# Patient Record
Sex: FEMALE | Race: WHITE | Employment: OTHER | RURAL
[De-identification: names, ages, dates, MRNs, and addresses within clinical notes are randomized per-mention and may not be internally consistent; named-entity substitution may affect disease eponyms.]

---

## 2017-01-11 ENCOUNTER — TELEPHONE (OUTPATIENT)
Dept: FAMILY MEDICINE CLINIC | Age: 82
End: 2017-01-11

## 2017-01-11 RX ORDER — METOPROLOL TARTRATE 25 MG/1
TABLET, FILM COATED ORAL
Qty: 14 TAB | Refills: 0 | Status: SHIPPED | OUTPATIENT
Start: 2017-01-11 | End: 2017-06-05 | Stop reason: SDUPTHER

## 2017-01-11 RX ORDER — METOPROLOL TARTRATE 25 MG/1
TABLET, FILM COATED ORAL
Qty: 90 TAB | Refills: 1 | Status: SHIPPED | OUTPATIENT
Start: 2017-01-11 | End: 2017-01-11 | Stop reason: SDUPTHER

## 2017-01-11 NOTE — TELEPHONE ENCOUNTER
Pt's daughter called stated her mother has no metoprolol left. She needs a 14 days supply sent to [facility] and 90 day supply sent to mail order.

## 2017-01-17 ENCOUNTER — OFFICE VISIT (OUTPATIENT)
Dept: FAMILY MEDICINE CLINIC | Age: 82
End: 2017-01-17

## 2017-01-17 VITALS
HEART RATE: 45 BPM | SYSTOLIC BLOOD PRESSURE: 136 MMHG | HEIGHT: 67 IN | DIASTOLIC BLOOD PRESSURE: 44 MMHG | TEMPERATURE: 95.3 F | BODY MASS INDEX: 19.59 KG/M2 | WEIGHT: 124.8 LBS

## 2017-01-17 DIAGNOSIS — K30 CHRONIC UPSET STOMACH: Primary | ICD-10-CM

## 2017-01-17 NOTE — MR AVS SNAPSHOT
Visit Information Date & Time Provider Department Dept. Phone Encounter #  
 1/17/2017  2:00 PM Davion Sotelo NP [address] [phone] [id number] Upcoming Health Maintenance Date Due DTaP/Tdap/Td series (1 - Tdap) 12/19/1953 ZOSTER VACCINE AGE 60> 12/19/1992 GLAUCOMA SCREENING Q2Y 12/19/1997 OSTEOPOROSIS SCREENING (DEXA) 12/19/1997 MEDICARE YEARLY EXAM 3/10/2017 Pneumococcal 65+ Low/Medium Risk (2 of 2 - PPSV23) 11/8/2017 Allergies as of 1/17/2017  Review Complete On: 1/17/2017 By: Kaylene Riley LPN No Known Allergies Current Immunizations  Reviewed on 11/8/2016 Name Date Influenza High Dose Vaccine PF 10/14/2016 Influenza Vaccine Sharonda Bloodgood) 11/3/2015 Pneumococcal Conjugate (PCV-13) 11/8/2016 Not reviewed this visit You Were Diagnosed With   
  
 Codes Comments Chronic upset stomach    -  Primary ICD-10-CM: K31.9, R10.13 ICD-9-CM: 536.8 Vitals BP Pulse Temp Height(growth percentile) Weight(growth percentile) BMI  
 136/44 (BP 1 Location: Right arm, BP Patient Position: Sitting) (!) 45 95.3 °F (35.2 °C) (Oral) 5' 7\" (1.702 m) 124 lb 12.8 oz (56.6 kg) 19.55 kg/m2 OB Status Smoking Status Hysterectomy Former Smoker Vitals History BMI and BSA Data Body Mass Index Body Surface Area  
 19.55 kg/m 2 1.64 m 2 Preferred Pharmacy Pharmacy Name Phone [facility] [address] [place] [address] [address] Your Updated Medication List  
  
   
This list is accurate as of: 1/17/17  2:48 PM.  Always use your most recent med list.  
  
  
  
  
 cholecalciferol (VITAMIN D3) 5,000 unit Tab tablet Commonly known as:  VITAMIN D3 Take  by mouth daily. donepezil 5 mg tablet Commonly known as:  ARICEPT Take 1 Tab by mouth nightly. fluticasone 50 mcg/actuation nasal spray Commonly known as:  Felix Blizzard 2 Sprays by Both Nostrils route daily. metoprolol tartrate 25 mg tablet Commonly known as:  LOPRESSOR Take 1/2 tab  2x day  
  
 omeprazole 40 mg capsule Commonly known as:  PRILOSEC Take 1 capsule by mouth daily. sertraline 50 mg tablet Commonly known as:  ZOLOFT One po qd  
  
 valsartan 160 mg tablet Commonly known as:  DIOVAN  
1/2 tab BID We Performed the Following H PYLORI AB, IGG, QT [id number] CPT(R)] To-Do List   
 01/17/2017 Microbiology:  CULTURE, STOOL Introducing [facility]! [facility] introduces [employer] patient portal. Now you can access parts of your medical record, email your doctor's office, and request medication refills online. 1. In your internet browser, go to [email or web address] 2. Click on the First Time User? Click Here link in the Sign In box. You will see the New Member Sign Up page. 3. Enter your [employer] Access Code exactly as it appears below. You will not need to use this code after youve completed the sign-up process. If you do not sign up before the expiration date, you must request a new code. · [employer] Access Code: [email or web address] Expires: 4/17/2017  2:43 PM 
 
4. Enter the last four digits of your Social Security Number (xxxx) and Date of Birth (mm/dd/yyyy) as indicated and click Submit. You will be taken to the next sign-up page. 5. Create a Ink[employer] System[employer] ID. This will be your [employer] login ID and cannot be changed, so think of one that is secure and easy to remember. 6. Create a [employer] password. You can change your password at any time. 7. Enter your Password Reset Question and Answer. This can be used at a later time if you forget your password. 8. Enter your e-mail address. You will receive e-mail notification when new information is available in [address] 9. Click Sign Up. You can now view and download portions of your medical record. 10. Click the Download Summary menu link to download a portable copy of your medical information. If you have questions, please visit the Frequently Asked Questions section of the [employer] website. Remember, [employer] is NOT to be used for urgent needs. For medical emergencies, dial 911. Now available from your iPhone and Android! Please provide this summary of care documentation to your next provider. Your primary care clinician is listed as Rj Barreto. If you have any questions after today's visit, please call [phone].

## 2017-01-17 NOTE — PROGRESS NOTES
Subjective:     Dirk Amaral is a 80 y.o. female who presents today with the following:  Chief Complaint   Patient presents with    Vomiting    Fatigue    Decreased Appetite   Dirk Amaral presents for stomach concerns. Decrease appetite with vomiting some mornings. Ate some food left out in a can back in November when stomach issues started. Vomiting not witnessed by care takers. No chest pain, no angina no shortness of breath. No orthopnea or PND. No dependent edema. No abdominal pain, change in bowel habits, no blood in stool or black stools. No urinary frequency, urgency, dysuria. No change in voiding pattern or stream, no significant nocturia. No problems with medications and is compliant. Discrepancy with medications . Antihypertensive medication not filled for a few days. List provided to  to take home to share with patient's daughter. Having some increased memory issues per patient and her partner previous diagnosis of MCI. CT scan done in 2015       Problem list reviewed as part of this encounter. ROS:  Gen: denies fever, chills, fatigue, weight loss, weight gain  HEENT:denies blurry vision, nasal congestion, sore throat  Resp: denies dypsnea, cough, wheezing  CV: denies chest pain radiating to the jaws or arms, palpitations, lower extremity edema  Abd: denies nausea, vomiting, diarrhea, constipation  Neuro: denies numbness/tingling  Endo: denies polyuria, polydipsia, heat/cold intolerance  Heme: no lymphadenopathy    No Known Allergies      Current Outpatient Prescriptions:     metoprolol tartrate (LOPRESSOR) 25 mg tablet, Take 1/2 tab  2x day, Disp: 14 Tab, Rfl: 0    donepezil (ARICEPT) 5 mg tablet, Take 1 Tab by mouth nightly.  (Patient taking differently: Take 10 mg by mouth nightly.), Disp: 90 Tab, Rfl: 0    sertraline (ZOLOFT) 50 mg tablet, One po qd, Disp: 90 Tab, Rfl: 1    valsartan (DIOVAN) 160 mg tablet, 1/2 tab BID, Disp: 90 Tab, Rfl: 3    fluticasone (FLONASE) 50 mcg/actuation nasal spray, 2 Sprays by Both Nostrils route daily. , Disp: 3 Bottle, Rfl: 3    cholecalciferol, VITAMIN D3, (VITAMIN D3) 5,000 unit tab tablet, Take  by mouth daily. , Disp: , Rfl:     omeprazole (PRILOSEC) 40 mg capsule, Take 1 capsule by mouth daily. , Disp: 90 capsule, Rfl: 3    Past Medical History   Diagnosis Date    COPD [facility]chronic obstructive pulmonary disease) ([facility] [address]     HTN (hypertension)     MCI (mild cognitive impairment)        Past Surgical History   Procedure Laterality Date    Hx colonoscopy  6/14     has had several    Hx aleyda and bso         History   Smoking Status    Former Smoker    Packs/day: 1.00    Years: 33.00    Types: Cigarettes    Start date: 1/1/1952   Jimmie Cords Quit date: 1/1/1985   Smokeless Tobacco    Never Used     Comment: \"Quit many years ago\"       Social History     Social History    Marital status:      Spouse name: N/A    Number of children: N/A    Years of education: N/A     Social History Main Topics    Smoking status: Former Smoker     Packs/day: 1.00     Years: 33.00     Types: Cigarettes     Start date: 1/1/1952     Quit date: 1/1/1985    Smokeless tobacco: Never Used      Comment: \"Quit many years ago\"    Alcohol use No    Drug use: No    Sexual activity: Not Asked     Other Topics Concern    None     Social History Narrative       Family History   Problem Relation Age of Onset    Cancer Mother     Cancer Father     Stroke Sister     Cancer Sister          Objective:     Visit Vitals    /44 (BP 1 Location: Right arm, BP Patient Position: Sitting)  Comment: Manual    Pulse (!) 45    Temp 95.3 °F (35.2 °C) (Oral)    Ht 5' 7\" (1.702 m)    Wt 124 lb 12.8 oz (56.6 kg)    BMI 19.55 kg/m2     Body mass index is 19.55 kg/(m^2). Gen: alert, oriented, no acute distress, down 3 lbs since last visit.   Head: normocephalic, atraumatic  Eyes:sclera clear, conjunctiva clear  Oral: moist mucus membranes, no oral lesions, no pharyngeal exudate, no pharyngeal erythema  Neck: symmetric normal sized thyroid, no carotid bruit  Resp: Normal work of breathing, lungs CTAB, no w/r/r  CV: S1, S2 normal.  No murmurs, rubs, or gallops. Abd:  Normal bowel sounds. Soft, not tender, not distended. Skin: no rash             Extremities: no edema    Results for orders placed or performed in visit on 11/08/16   CBC WITH AUTOMATED DIFF   Result Value Ref Range    WBC CANCELED x10E3/uL    RBC CANCELED     HGB CANCELED     HCT CANCELED     PLATELET CANCELED     NEUTROPHILS CANCELED     Lymphocytes CANCELED     MONOCYTES CANCELED     EOSINOPHILS CANCELED     Abs Lymphocytes CANCELED     ABS. EOSINOPHILS CANCELED     ABS. BASOPHILS CANCELED    METABOLIC PANEL, COMPREHENSIVE   Result Value Ref Range    Glucose 88 65 - 99 mg/dL    BUN 13 8 - 27 mg/dL    Creatinine 0.99 0.57 - 1.00 mg/dL    GFR est non-AA 53 (L) >59 mL/min/1.73    GFR est AA 61 >59 mL/min/1.73    BUN/Creatinine ratio 13 11 - 26    Sodium 143 136 - 144 mmol/L    Potassium 5.2 3.5 - 5.2 mmol/L    Chloride 106 97 - 106 mmol/L    CO2 24 18 - 29 mmol/L    Calcium 9.2 8.7 - 10.3 mg/dL    Protein, total 6.5 6.0 - 8.5 g/dL    Albumin 4.2 3.5 - 4.7 g/dL    GLOBULIN, TOTAL 2.3 1.5 - 4.5 g/dL    A-G Ratio 1.8 1.1 - 2.5    Bilirubin, total 0.4 0.0 - 1.2 mg/dL    Alk. phosphatase 53 39 - 117 IU/L    AST 25 0 - 40 IU/L    ALT 17 0 - 32 IU/L   TSH 3RD GENERATION   Result Value Ref Range    TSH 1.730 0.450 - 4.500 uIU/mL   VITAMIN B12   Result Value Ref Range    Vitamin B12 222 211 - 946 pg/mL   CKD REPORT   Result Value Ref Range    Interpretation Note        No results found for this visit on 01/17/17. Assessment/ Plan:     Heena Ramachandran was seen today for vomiting, fatigue and decreased appetite. Diagnoses and all orders for this visit:    Chronic upset stomach  -     H PYLORI AB, IGG, QT  -     CULTURE, STOOL; Future         1.  Chronic upset stomach        Orders Placed This Encounter    CULTURE, STOOL     Standing Status:   Future     Standing Expiration Date:   7/17/2017    H PYLORI AB, IGG, QT     Plan to discuss care with daughter. Addressed 6 small meals per day. Nutrition,  Monitor for dehydration and malnutrition     Verbal and written instructions (see AVS) provided.  Patient expresses understanding of diagnosis and treatment plan.     Renea Dominguez, DENNISP-C

## 2017-01-19 LAB — H PYLORI IGG SER IA-ACNC: 0.9 U/ML (ref 0–0.8)

## 2017-01-20 ENCOUNTER — TELEPHONE (OUTPATIENT)
Dept: FAMILY MEDICINE CLINIC | Age: 82
End: 2017-01-20

## 2017-01-20 RX ORDER — CLARITHROMYCIN 500 MG/1
500 TABLET, FILM COATED ORAL 2 TIMES DAILY
Qty: 14 TAB | Refills: 0 | Status: SHIPPED | OUTPATIENT
Start: 2017-01-20 | End: 2017-01-27

## 2017-01-20 RX ORDER — METRONIDAZOLE 250 MG/1
250 TABLET ORAL 4 TIMES DAILY
Qty: 40 TAB | Refills: 0 | Status: SHIPPED | OUTPATIENT
Start: 2017-01-20 | End: 2017-01-30

## 2017-01-20 NOTE — PROGRESS NOTES
Positive for H pylori  Continue prilosec   Clarithromycin 500 mg Q 12 hours x 7 days  Flagyl 250 mg q 6 hours x 10 days    Would like patient to consider  Namenda 10mg bid for memory loss .   Aricept big pill     Would consider consult with speech therapist to evaluate if decreased appetite , vomiting persist

## 2017-01-23 ENCOUNTER — TELEPHONE (OUTPATIENT)
Dept: FAMILY MEDICINE CLINIC | Age: 82
End: 2017-01-23

## 2017-01-23 NOTE — TELEPHONE ENCOUNTER
Spoke with partner Jaswinder Conner. Patient has had nausea,vomitimg and diarrhea since she was last seen. Denies any pain,fever,chills or problems voiding. Was able to eat waffles this am and took antibiotcs but vomited shortly after that. Please advise.

## 2017-01-23 NOTE — TELEPHONE ENCOUNTER
Also advised Mr Oliver Anjali per Elisa Chen stop Flagyl and only take antibiotic 2 times a day. Advised him to take her ER if she is not any better.

## 2017-01-27 ENCOUNTER — OFFICE VISIT (OUTPATIENT)
Dept: SURGERY | Age: 82
End: 2017-01-27

## 2017-01-27 VITALS
WEIGHT: 127.8 LBS | BODY MASS INDEX: 20.06 KG/M2 | DIASTOLIC BLOOD PRESSURE: 47 MMHG | HEART RATE: 52 BPM | SYSTOLIC BLOOD PRESSURE: 138 MMHG | HEIGHT: 67 IN

## 2017-01-27 DIAGNOSIS — R19.7 VOMITING AND DIARRHEA: Primary | ICD-10-CM

## 2017-01-27 DIAGNOSIS — R11.10 VOMITING AND DIARRHEA: Primary | ICD-10-CM

## 2017-01-27 NOTE — PROGRESS NOTES
Beti Cali old white female who has memory issues and is unable to give a complete history but most is gotten from the records and her friend. She is oriented and able to answer questions appropriately. It appear she ate some bad soup and developed some vomiting and abdominal pain. Along the way she was found to have H.pylori by some test.  She was placed on Azithromycin and Flagyl and the Flagyl upset her stomach and gave her diarrhea. She stopped the Flagyl and she felt better. At this point in time she feels \"good\" and has no c/o. She ate a breakfast of cereal and had a normal BM today. She has had no previous episodes like this. She feels back to normal now. EXAM:  Visit Vitals    /47 (BP 1 Location: Left arm, BP Patient Position: Sitting)    Pulse (!) 52    Ht 5' 7\" (1.702 m)    Wt 127 lb 12.8 oz (58 kg)    BMI 20.02 kg/m2       Thin white female in no acute distress  Abdomen benign    IMP:  Most likely a mild food poisoning that was exacerbated by her antibiotic therapy. Resolved now. PLAN:  Probiotic daily for one month.   See PMD if problems

## 2017-04-26 RX ORDER — DONEPEZIL HYDROCHLORIDE 5 MG/1
5 TABLET, FILM COATED ORAL
Qty: 90 TAB | Refills: 0 | Status: SHIPPED | OUTPATIENT
Start: 2017-04-26 | End: 2017-09-06 | Stop reason: SDUPTHER

## 2017-06-05 RX ORDER — METOPROLOL TARTRATE 25 MG/1
TABLET, FILM COATED ORAL
Qty: 45 TAB | Refills: 3 | Status: SHIPPED | OUTPATIENT
Start: 2017-06-05 | End: 2017-12-12 | Stop reason: ALTCHOICE

## 2017-09-07 RX ORDER — SERTRALINE HYDROCHLORIDE 50 MG/1
TABLET, FILM COATED ORAL
Qty: 90 TAB | Refills: 1 | Status: SHIPPED | OUTPATIENT
Start: 2017-09-07 | End: 2018-01-29 | Stop reason: SDUPTHER

## 2017-09-07 RX ORDER — DONEPEZIL HYDROCHLORIDE 5 MG/1
TABLET, FILM COATED ORAL
Qty: 90 TAB | Refills: 0 | Status: SHIPPED | OUTPATIENT
Start: 2017-09-07 | End: 2017-12-12 | Stop reason: SDUPTHER

## 2017-09-13 ENCOUNTER — TELEPHONE (OUTPATIENT)
Dept: FAMILY MEDICINE CLINIC | Age: 82
End: 2017-09-13

## 2017-09-13 NOTE — TELEPHONE ENCOUNTER
Spoke with daughter Darin Higgins who states she is a RN,expressed concerns about patient not feeling generally well,denies fever,pain but reports having 2 fall yesterday ,more lethargic,denies any injury . Advised ER evaluation but daughter did not agree and states her mother would not want to go. Hbas an appointment with DR Dolly Pandya 10-  for a checkup,but instructed to call in AM for a same day appointment for a sooner evaluation.

## 2017-10-10 ENCOUNTER — OFFICE VISIT (OUTPATIENT)
Dept: FAMILY MEDICINE CLINIC | Age: 82
End: 2017-10-10

## 2017-10-10 VITALS
HEIGHT: 67 IN | TEMPERATURE: 97.8 F | OXYGEN SATURATION: 98 % | HEART RATE: 55 BPM | BODY MASS INDEX: 19.15 KG/M2 | DIASTOLIC BLOOD PRESSURE: 63 MMHG | WEIGHT: 122 LBS | SYSTOLIC BLOOD PRESSURE: 137 MMHG | RESPIRATION RATE: 16 BRPM

## 2017-10-10 DIAGNOSIS — Z00.00 MEDICARE ANNUAL WELLNESS VISIT, SUBSEQUENT: Primary | ICD-10-CM

## 2017-10-10 DIAGNOSIS — I10 ESSENTIAL HYPERTENSION: ICD-10-CM

## 2017-10-10 DIAGNOSIS — Z71.89 ADVANCE DIRECTIVE DISCUSSED WITH PATIENT: ICD-10-CM

## 2017-10-10 DIAGNOSIS — Z13.31 SCREENING FOR DEPRESSION: ICD-10-CM

## 2017-10-10 DIAGNOSIS — Z13.39 SCREENING FOR ALCOHOLISM: ICD-10-CM

## 2017-10-10 DIAGNOSIS — J44.9 CHRONIC OBSTRUCTIVE PULMONARY DISEASE, UNSPECIFIED COPD TYPE (HCC): ICD-10-CM

## 2017-10-10 RX ORDER — LORATADINE 10 MG/1
10 TABLET ORAL
COMMUNITY
End: 2018-10-25 | Stop reason: SDUPTHER

## 2017-10-10 NOTE — PROGRESS NOTES
David Santiago is a 80 y.o. female and presents for annual Medicare Wellness Visit. Hypertension. Blood pressures have been stable. Management at last visit included continuing current regimen . Current regimen: angiotensin II receptor antagonist and beta-blocker. Symptoms include tiredness/fatigue. Patient denies chest pain, palpitations, peripheral edema. Lab review:   Lab Results   Component Value Date/Time    Sodium 143 11/08/2016 08:45 AM    Potassium 5.2 11/08/2016 08:45 AM    Chloride 106 11/08/2016 08:45 AM    CO2 24 11/08/2016 08:45 AM    Glucose 88 11/08/2016 08:45 AM    BUN 13 11/08/2016 08:45 AM    Creatinine 0.99 11/08/2016 08:45 AM    BUN/Creatinine ratio 13 11/08/2016 08:45 AM    GFR est AA 61 11/08/2016 08:45 AM    GFR est non-AA 53 11/08/2016 08:45 AM    Calcium 9.2 11/08/2016 08:45 AM       PMH, SH, Medications/Allergies: reviewed, on chart.     ROS:  Constitutional: No fever, chills or weight loss  Respiratory: No cough, SOB   CV: No chest pain or Palpitations    Visit Vitals    /63 (BP 1 Location: Left arm, BP Patient Position: Sitting)    Pulse (!) 55    Temp 97.8 °F (36.6 °C) (Temporal)    Resp 16    Ht 5' 7\" (1.702 m)    Wt 122 lb (55.3 kg)    SpO2 98%    BMI 19.11 kg/m2     Wt Readings from Last 3 Encounters:   10/10/17 122 lb (55.3 kg)   01/27/17 127 lb 12.8 oz (58 kg)   01/17/17 124 lb 12.8 oz (56.6 kg)     Physical Examination: General appearance - alert, well appearing, and in no distress  Mental status - alert, oriented to person, place, and time  Eyes - pupils equal and reactive, extraocular eye movements intact  ENT - bilateral external ears and nose normal. Normal lips  Neck - supple, no significant adenopathy, no thyromegaly or mass  Lymphatics - no palpable lymphadenopathy, no hepatosplenomegaly  Chest - clear to auscultation, no wheezes, rales or rhonchi, symmetric air entry  Heart - normal rate, regular rhythm, normal S1, S2, no murmurs, rubs, clicks or gallops  Extremities - peripheral pulses normal, no pedal edema, no clubbing or cyanosis    A/P:  HTN  With some bradycardia, seems to be symptomatic, given recent problems climbing the stairs. Try change to norvasc 2.5mg qd, may also make COPD a little less bothersome. Sent to [facility].    _____________________________________________________________________________________________________________________________________________________________________    Problem List: Reviewed with patient and discussed risk factors.     Patient Active Problem List   Diagnosis Code    MCI (mild cognitive impairment) G31.84    White matter disease R90.82    B12 deficiency E53.8    Hypothyroid E03.9    Screen for colon cancer Z12.11    COPD (chronic obstructive pulmonary disease) ([facility] [address] J44.9    HTN (hypertension) I10       Current medical providers:  Patient Care Team:  Stephen Carey NP as PCP - General (Nurse Practitioner)    PSH: Reviewed with patient  Past Surgical History:   Procedure Laterality Date    HX COLONOSCOPY  6/14    has had several    HX MARLEN AND BSO          SH: Reviewed with patient  Social History   Substance Use Topics    Smoking status: Former Smoker     Packs/day: 1.00     Years: 33.00     Types: Cigarettes     Start date: 1/1/1952     Quit date: 1/1/1985    Smokeless tobacco: Never Used      Comment: \"Quit many years ago\"    Alcohol use No       FH: Reviewed with patient  Family History   Problem Relation Age of Onset    Cancer Mother     Cancer Father     Stroke Sister     Cancer Sister        Medications/Allergies: Reviewed with patient  Current Outpatient Prescriptions on File Prior to Visit   Medication Sig Dispense Refill    donepezil (ARICEPT) 5 mg tablet TAKE 1 TABLET  NIGHTLY 90 Tab 0    sertraline (ZOLOFT) 50 mg tablet One po qd 90 Tab 1    metoprolol tartrate (LOPRESSOR) 25 mg tablet Take 1/2 tab  2x day 45 Tab 3    valsartan (DIOVAN) 160 mg tablet 1/2 tab BID 90 Tab 3    fluticasone (FLONASE) 50 mcg/actuation nasal spray 2 Sprays by Both Nostrils route daily. 3 Bottle 3    cholecalciferol, VITAMIN D3, (VITAMIN D3) 5,000 unit tab tablet Take  by mouth daily.  omeprazole (PRILOSEC) 40 mg capsule Take 1 capsule by mouth daily. 90 capsule 3     No current facility-administered medications on file prior to visit. Allergies   Allergen Reactions    Pcn [Penicillins] Shortness of Breath       Objective:  Visit Vitals    Ht 5' 7\" (1.702 m)    Wt 122 lb (55.3 kg)    BMI 19.11 kg/m2    Body mass index is 19.11 kg/(m^2). Assessment of cognitive impairment: Alert and oriented x 3    Depression Screen:   PHQ over the last two weeks 10/10/2017   Little interest or pleasure in doing things Not at all   Feeling down, depressed or hopeless Nearly every day   Total Score PHQ 2 3   Trouble falling or staying asleep, or sleeping too much Not at all   Feeling tired or having little energy Nearly every day   Poor appetite or overeating Several days   Feeling bad about yourself - or that you are a failure or have let yourself or your family down Not at all   Trouble concentrating on things such as school, work, reading or watching TV Nearly every day   Moving or speaking so slowly that other people could have noticed; or the opposite being so fidgety that others notice Several days   Thoughts of being better off dead, or hurting yourself in some way Not at all   PHQ 9 Score 11   How difficult have these problems made it for you to do your work, take care of your home and get along with others Not difficult at all       Fall Risk Assessment:    Fall Risk Assessment, last 12 mths 10/10/2017   Able to walk? Yes   Fall in past 12 months? Yes   Fall with injury? No   Number of falls in past 12 months 3   Fall Risk Score 3       Functional Ability:   Does the patient exhibit a steady gait? yes   How long did it take the patient to get up and walk from a sitting position? 2s   Is the patient self reliant? (ie can do own laundry, meals, household chores)  yes     Does the patient handle his/her own medications?  no     Does the patient handle his/her own money? no     Is the patients home safe (ie good lighting, handrails on stairs and bath, etc.)? yes     Did you notice or did patient express any hearing difficulties? yes     Did you notice or did patient express any vision difficulties?   no     Were distance and reading eye charts used? no       Advance Care Planning:   Patient was offered the opportunity to discuss advance care planning:  yes     Does patient have an Advance Directive:  yes   If no, did you provide information on Caring Connections?  no       Plan:      Plan discuss DXA at f/u. Plan discuss zostavax at f/U  ACP discussed. Orders Placed This Encounter    loratadine (CLARITIN) 10 mg tablet       Health Maintenance   Topic Date Due    DTaP/Tdap/Td series (1 - Tdap) 12/19/1953    ZOSTER VACCINE AGE 60>  10/19/1992    GLAUCOMA SCREENING Q2Y  12/19/1997    OSTEOPOROSIS SCREENING (DEXA)  12/19/1997    MEDICARE YEARLY EXAM  03/10/2017    INFLUENZA AGE 9 TO ADULT  08/01/2017    Pneumococcal 65+ Low/Medium Risk (2 of 2 - PPSV23) 11/08/2017     *Patient verbalized understanding and agreement with the plan. A copy of the After Visit Summary with personalized health plan was given to the patient today.

## 2017-10-10 NOTE — MR AVS SNAPSHOT
Visit Information Date & Time Provider Department Dept. Phone Encounter #  
 10/10/2017  3:20 PM Marc Ceron, [address] [place] [address] [phone] [id number] Upcoming Health Maintenance Date Due DTaP/Tdap/Td series (1 - Tdap) 12/19/1953 ZOSTER VACCINE AGE 60> 10/19/1992 GLAUCOMA SCREENING Q2Y 12/19/1997 OSTEOPOROSIS SCREENING (DEXA) 12/19/1997 MEDICARE YEARLY EXAM 3/10/2017 INFLUENZA AGE 9 TO ADULT 8/1/2017 Pneumococcal 65+ Low/Medium Risk (2 of 2 - PPSV23) 11/8/2017 Allergies as of 10/10/2017  Review Complete On: 10/10/2017 By: Marc Ceron MD  
  
 Severity Noted Reaction Type Reactions Pcn [Penicillins]  10/10/2017    Shortness of Breath Current Immunizations  Reviewed on 11/8/2016 Name Date Influenza High Dose Vaccine PF 9/27/2017, 10/14/2016 Influenza Vaccine Ricki Me) 11/3/2015 Pneumococcal Conjugate (PCV-13) 11/8/2016 Not reviewed this visit You Were Diagnosed With   
  
 Codes Comments Medicare annual wellness visit, subsequent    -  Primary ICD-10-CM: Z00.00 ICD-9-CM: V70.0 Advance directive discussed with patient     ICD-10-CM: Z71.89 ICD-9-CM: V65.49 Chronic obstructive pulmonary disease, unspecified COPD type ([facility]     ICD-10-CM: J44.9 ICD-9-CM: 432 Essential hypertension     ICD-10-CM: I10 
ICD-9-CM: 401.9 Hypothyroidism due to acquired atrophy of thyroid     ICD-10-CM: E03.4 ICD-9-CM: 244.8, 246.8 MCI (mild cognitive impairment)     ICD-10-CM: G31.84 ICD-9-CM: 331.83 Vitals BP Pulse Temp Resp Height(growth percentile)  
 137/63 (BP 1 Location: Left arm, BP Patient Position: Sitting) (!) 55 97.8 °F (36.6 °C) (Temporal) 16 5' 7\" (1.702 m) Weight(growth percentile) SpO2 BMI OB Status Smoking Status 122 lb (55.3 kg) 98% 19.11 kg/m2 Hysterectomy Former Smoker Vitals History BMI and BSA Data Body Mass Index Body Surface Area 19.11 kg/m 2 1.62 m 2 Preferred Pharmacy Pharmacy Name Phone [facility] [address] [place] [address] 0925 [facility] [address] [place] [address] [phone] Your Updated Medication List  
  
   
This list is accurate as of: 10/10/17  4:03 PM.  Always use your most recent med list.  
  
  
  
  
 cholecalciferol (VITAMIN D3) 5,000 unit Tab tablet Commonly known as:  VITAMIN D3 Take  by mouth daily. CLARITIN 10 mg tablet Generic drug:  loratadine Take 10 mg by mouth. donepezil 5 mg tablet Commonly known as:  ARICEPT  
TAKE 1 TABLET  NIGHTLY  
  
 fluticasone 50 mcg/actuation nasal spray Commonly known as:  Yee Reges 2 Sprays by Both Nostrils route daily. metoprolol tartrate 25 mg tablet Commonly known as:  LOPRESSOR Take 1/2 tab  2x day  
  
 omeprazole 40 mg capsule Commonly known as:  PRILOSEC Take 1 capsule by mouth daily. sertraline 50 mg tablet Commonly known as:  ZOLOFT One po qd  
  
 valsartan 160 mg tablet Commonly known as:  DIOVAN  
1/2 tab BID Patient Instructions Schedule of Personalized Health Plan (Provide Copy to Patient) The best way to stay healthy is to live a healthy lifestyle. A healthy lifestyle includes regular exercise, eating a well-balanced diet, keeping a healthy weight and not smoking. Regular physical exams and screening tests are another important way to take care of yourself. Preventive exams provided by health care providers can find health problems early when treatment works best and can keep you from getting certain diseases or illnesses. Preventive services include exams, lab tests, screenings, shots, monitoring and information to help you take care of your own health. All people over 65 should have a pneumonia shot. Pneumonia shots are usually only needed once in a lifetime unless your doctor decides differently. All people over 65 should have a yearly flu shot. People over 65 are at medium to high risk for Hepatitis B. Three shots are needed for complete protection. In addition to your physical exam, some screening tests are recommended: 
 
Bone mass measurement (dexa scan) is recommended every two years Diabetes Mellitus screening is recommended every year. Glaucoma is an eye disease caused by high pressure in the eye. An eye exam is recommended every year. Cardiovascular screening tests that check your cholesterol and other blood fat (lipid) levels are recommended every five years. Colorectal Cancer screening tests help to find pre-cancerous polyps (growths in the colon) so they can be removed before they turn into cancer. Tests ordered for screening depend on your personal and family history risk factors. Screening for Breast Cancer is recommended yearly with a mammogram. 
 
Screening for Cervical Cancer is recommended every two years (annually for certain risk factors, such as previous history of STD or abnormal PAP in past 7 years), with a Pelvic Exam with PAP Here is a list of your current Health Maintenance items with a due date: 
Health Maintenance Topic Date Due  
 DTaP/Tdap/Td series (1 - Tdap) 12/19/1953  ZOSTER VACCINE AGE 60>  10/19/1992  GLAUCOMA SCREENING Q2Y  12/19/1997  
 OSTEOPOROSIS SCREENING (DEXA)  12/19/1997  MEDICARE YEARLY EXAM  03/10/2017  INFLUENZA AGE 9 TO ADULT  08/01/2017  Pneumococcal 65+ Low/Medium Risk (2 of 2 - PPSV23) 11/08/2017 Introducing [facility]! [employer] introduces [facility] [place] patient portal. Now you can access parts of your medical record, email your doctor's office, and request medication refills online. 1. In your internet browser, go to [email or web address] 2. Click on the First Time User? Click Here link in the Sign In box. You will see the New Member Sign Up page. 3. Enter your [facility] [place] Access Code exactly as it appears below.  You will not need to use this code after youve completed the sign-up process. If you do not sign up before the expiration date, you must request a new code. · [facility] Access Code: [email or web address] Expires: 1/8/2018  4:03 PM 
 
4. Enter the last four digits of your Social Security Number (xxxx) and Date of Birth (mm/dd/yyyy) as indicated and click Submit. You will be taken to the next sign-up page. 5. Create a Plex ID. This will be your Plex login ID and cannot be changed, so think of one that is secure and easy to remember. 6. Create a Plex password. You can change your password at any time. 7. Enter your Password Reset Question and Answer. This can be used at a later time if you forget your password. 8. Enter your e-mail address. You will receive e-mail notification when new information is available in [address]. 9. Click Sign Up. You can now view and download portions of your medical record. 10. Click the Download Summary menu link to download a portable copy of your medical information. If you have questions, please visit the Frequently Asked Questions section of the [employer] website. Remember, [facility] is NOT to be used for urgent needs. For medical emergencies, dial 911. Now available from your iPhone and Android! Please provide this summary of care documentation to your next provider. Your primary care clinician is listed as Lucia Snyder. If you have any questions after today's visit, please call [phone].

## 2017-10-10 NOTE — PATIENT INSTRUCTIONS
Schedule of Personalized Health Plan  (Provide Copy to Patient)  The best way to stay healthy is to live a healthy lifestyle. A healthy lifestyle includes regular exercise, eating a well-balanced diet, keeping a healthy weight and not smoking. Regular physical exams and screening tests are another important way to take care of yourself. Preventive exams provided by health care providers can find health problems early when treatment works best and can keep you from getting certain diseases or illnesses. Preventive services include exams, lab tests, screenings, shots, monitoring and information to help you take care of your own health. All people over 65 should have a pneumonia shot. Pneumonia shots are usually only needed once in a lifetime unless your doctor decides differently. All people over 65 should have a yearly flu shot. People over 65 are at medium to high risk for Hepatitis B. Three shots are needed for complete protection. In addition to your physical exam, some screening tests are recommended:    Bone mass measurement (dexa scan) is recommended every two years  Diabetes Mellitus screening is recommended every year. Glaucoma is an eye disease caused by high pressure in the eye. An eye exam is recommended every year. Cardiovascular screening tests that check your cholesterol and other blood fat (lipid) levels are recommended every five years. Colorectal Cancer screening tests help to find pre-cancerous polyps (growths in the colon) so they can be removed before they turn into cancer. Tests ordered for screening depend on your personal and family history risk factors.     Screening for Breast Cancer is recommended yearly with a mammogram.    Screening for Cervical Cancer is recommended every two years (annually for certain risk factors, such as previous history of STD or abnormal PAP in past 7 years), with a Pelvic Exam with PAP    Here is a list of your current Health Maintenance items with a due date:  Health Maintenance   Topic Date Due    DTaP/Tdap/Td series (1 - Tdap) 12/19/1953    ZOSTER VACCINE AGE 60>  10/19/1992    GLAUCOMA SCREENING Q2Y  12/19/1997    OSTEOPOROSIS SCREENING (DEXA)  12/19/1997    MEDICARE YEARLY EXAM  03/10/2017    INFLUENZA AGE 9 TO ADULT  08/01/2017    Pneumococcal 65+ Low/Medium Risk (2 of 2 - PPSV23) 11/08/2017         Medicare Wellness Visit, Female    The best way to live healthy is to have a healthy lifestyle by eating a well-balanced diet, exercising regularly, limiting alcohol and stopping smoking. Regular physical exams and screening tests are another way to keep healthy. Preventive exams provided by your health care provider can find health problems before they become diseases or illnesses. Preventive services including immunizations, screening tests, monitoring and exams can help you take care of your own health. All people over age 72 should have a pneumovax  and and a prevnar shot to prevent pneumonia. These are once in a lifetime unless you and your provider decide differently. All people over 65 should have a yearly flu shot and a tetanus vaccine every 10 years. A bone mass density to screen for osteoporosis or thinning of the bones should be done every 2 years after 65. Screening for diabetes mellitus with a blood sugar test should be done every year. Glaucoma is a disease of the eye due to increased ocular pressure that can lead to blindness and it should be done every year by an eye professional.    Cardiovascular screening tests that check for elevated lipids (fatty part of blood) which can lead to heart disease and strokes should be done every 5 years. Colorectal screening that evaluates for blood or polyps in your colon should be done yearly as a stool test or every five years as a flexible sigmoidoscope or every 10 years as a colonoscopy up to age 76.     Breast cancer screening with a mammogram is recommended biennially  for women age 54-69. Screening for cervical cancer with a pap smear and pelvic exam is recommended for women after age 72 years every 2 years up to age 79 or when the provider and patient decide to stop. If there is a history of cervical abnormalities or other increased risk for cancer then the test is recommended yearly. Hepatitis C screening is also recommended for anyone born between [birth year] through [address] [birth year]. A shingles vaccine is also recommended once in a lifetime after age 61. Your Medicare Wellness Exam is recommended annually.     Here is a list of your current Health Maintenance items with a due date:  Health Maintenance Due   Topic Date Due    DTaP/Tdap/Td  (1 - Tdap) 12/19/1953    Shingles Vaccine  10/19/1992    Glaucoma Screening   12/19/1997    Bone Density Screening  12/19/1997    Annual Well Visit  03/10/2017    Flu Vaccine  08/01/2017

## 2017-10-10 NOTE — ACP (ADVANCE CARE PLANNING)
Discussed with patient. Patient has medical directive at home and will bring to have scanned in her chart.

## 2017-10-31 RX ORDER — AMLODIPINE BESYLATE 2.5 MG/1
2.5 TABLET ORAL DAILY
Qty: 90 TAB | Refills: 3 | Status: SHIPPED | OUTPATIENT
Start: 2017-10-31 | End: 2018-10-25 | Stop reason: SDUPTHER

## 2017-10-31 NOTE — TELEPHONE ENCOUNTER
Daughter was not home at the time of my phone call ,message given to patient advised her to have daughter call for any further questions.

## 2017-10-31 NOTE — TELEPHONE ENCOUNTER
Mother was having her BP medications changed from metoprolol to Norvasc but nothing has been sent in to [facility] yet per daughter. We need to call daughter back at [facility] home.

## 2017-10-31 NOTE — TELEPHONE ENCOUNTER
Per Dr Jenn Montano note from 10/10/17. Try change to norvasc 2.5mg every day. I do not see where Rx has been sent in will send request to him .

## 2017-11-06 ENCOUNTER — TELEPHONE (OUTPATIENT)
Dept: FAMILY MEDICINE CLINIC | Age: 82
End: 2017-11-06

## 2017-11-06 NOTE — TELEPHONE ENCOUNTER
Patient's daughter Ria Gottron came in today. She received the new BP medication Amlodipine and has stopped the Metoprolol but not sure if she needs to continue the Valsartan. Was not documented in last office visit note. Please advise. Phone number [phone].

## 2017-11-21 RX ORDER — VALSARTAN 160 MG/1
TABLET ORAL
Qty: 90 TAB | Refills: 3 | Status: SHIPPED | OUTPATIENT
Start: 2017-11-21 | End: 2018-10-24 | Stop reason: SDUPTHER

## 2017-12-12 ENCOUNTER — OFFICE VISIT (OUTPATIENT)
Dept: FAMILY MEDICINE CLINIC | Age: 82
End: 2017-12-12

## 2017-12-12 VITALS
TEMPERATURE: 98.3 F | SYSTOLIC BLOOD PRESSURE: 153 MMHG | HEIGHT: 67 IN | OXYGEN SATURATION: 100 % | HEART RATE: 54 BPM | WEIGHT: 126 LBS | DIASTOLIC BLOOD PRESSURE: 57 MMHG | BODY MASS INDEX: 19.78 KG/M2

## 2017-12-12 DIAGNOSIS — E53.8 B12 DEFICIENCY: ICD-10-CM

## 2017-12-12 DIAGNOSIS — G31.84 MCI (MILD COGNITIVE IMPAIRMENT): ICD-10-CM

## 2017-12-12 DIAGNOSIS — J44.9 CHRONIC OBSTRUCTIVE PULMONARY DISEASE, UNSPECIFIED COPD TYPE (HCC): ICD-10-CM

## 2017-12-12 DIAGNOSIS — Z23 ENCOUNTER FOR IMMUNIZATION: ICD-10-CM

## 2017-12-12 DIAGNOSIS — I10 ESSENTIAL HYPERTENSION: Primary | ICD-10-CM

## 2017-12-12 DIAGNOSIS — E03.4 HYPOTHYROIDISM DUE TO ACQUIRED ATROPHY OF THYROID: ICD-10-CM

## 2017-12-12 RX ORDER — DONEPEZIL HYDROCHLORIDE 5 MG/1
TABLET, FILM COATED ORAL
Qty: 90 TAB | Refills: 3 | Status: SHIPPED | OUTPATIENT
Start: 2017-12-12 | End: 2018-04-05 | Stop reason: SDUPTHER

## 2017-12-12 NOTE — PROGRESS NOTES
Elise Ge is a 80 y.o. female and presents for annual Medicare Wellness Visit. Hypertension. Blood pressures have been stable. Management at last visit included  Current regimen: angiotensin II receptor antagonist and beta-blocker. Symptoms include tiredness/fatigue. Patient denies chest pain, palpitations, peripheral edema. Lab review:   Lab Results   Component Value Date/Time    Sodium 143 11/08/2016 08:45 AM    Potassium 5.2 11/08/2016 08:45 AM    Chloride 106 11/08/2016 08:45 AM    CO2 24 11/08/2016 08:45 AM    Glucose 88 11/08/2016 08:45 AM    BUN 13 11/08/2016 08:45 AM    Creatinine 0.99 11/08/2016 08:45 AM    BUN/Creatinine ratio 13 11/08/2016 08:45 AM    GFR est AA 61 11/08/2016 08:45 AM    GFR est non-AA 53 11/08/2016 08:45 AM    Calcium 9.2 11/08/2016 08:45 AM       PMH, SH, Medications/Allergies: reviewed, on chart.     ROS:  Constitutional: No fever, chills or weight loss  Respiratory: No cough, SOB   CV: No chest pain or Palpitations    Visit Vitals    /57 (BP 1 Location: Left arm, BP Patient Position: Sitting)    Pulse (!) 54    Temp 98.3 °F (36.8 °C) (Temporal)    Ht 5' 7\" (1.702 m)    Wt 126 lb (57.2 kg)    SpO2 100%    BMI 19.73 kg/m2     Wt Readings from Last 3 Encounters:   12/12/17 126 lb (57.2 kg)   10/10/17 122 lb (55.3 kg)   01/27/17 127 lb 12.8 oz (58 kg)     Physical Examination: General appearance - alert, well appearing, and in no distress  Mental status - alert, oriented to person, place, and time  Eyes - pupils equal and reactive, extraocular eye movements intact  ENT - bilateral external ears and nose normal. Normal lips  Neck - supple, no significant adenopathy, no thyromegaly or mass  Lymphatics - no palpable lymphadenopathy, no hepatosplenomegaly  Chest - clear to auscultation, no wheezes, rales or rhonchi, symmetric air entry  Heart - normal rate, regular rhythm, normal S1, S2, no murmurs, rubs, clicks or gallops  Extremities - peripheral pulses normal, no pedal edema, no clubbing or cyanosis    A/P:  HTN  Still a little bradycardic, but feeling better. con't current tx. Hypothyroid  well controlled. con't current tx. Check lab, adjust meds PRN    B12 def  Check B12 level and CBC. tx PRN. HCM  Prevnar 13 UTD, flu UTD. Pneumovax 23 due. Give today. Discussed DXA, thinks had at [facility]. Check records. Plan discuss shingrix at f/U  ACP discussed.     F/U 3mo

## 2017-12-12 NOTE — MR AVS SNAPSHOT
Visit Information Date & Time Provider Department Dept. Phone Encounter #  
 12/12/2017  2:00 PM Kingsley Ruff, [address] [phone] [id number] Follow-up Instructions Return in about 3 months (around 3/12/2018). Upcoming Health Maintenance Date Due DTaP/Tdap/Td series (1 - Tdap) 12/19/1953 ZOSTER VACCINE AGE 60> 10/19/1992 GLAUCOMA SCREENING Q2Y 12/19/1997 OSTEOPOROSIS SCREENING (DEXA) 12/19/1997 Pneumococcal 65+ Low/Medium Risk (2 of 2 - PPSV23) 11/8/2017 MEDICARE YEARLY EXAM 10/11/2018 Allergies as of 12/12/2017  Review Complete On: 12/12/2017 By: Kingsley Ruff MD  
  
 Severity Noted Reaction Type Reactions Pcn [Penicillins]  10/10/2017    Shortness of Breath Current Immunizations  Reviewed on 11/8/2016 Name Date Influenza High Dose Vaccine PF 9/27/2017, 10/14/2016 Influenza Vaccine Mariela Ryne) 11/3/2015 Pneumococcal Conjugate (PCV-13) 11/8/2016 Pneumococcal Polysaccharide (PPSV-23)  Incomplete Not reviewed this visit You Were Diagnosed With   
  
 Codes Comments Essential hypertension    -  Primary ICD-10-CM: I10 
ICD-9-CM: 401.9 MCI (mild cognitive impairment)     ICD-10-CM: G31.84 ICD-9-CM: 331.83 Hypothyroidism due to acquired atrophy of thyroid     ICD-10-CM: E03.4 ICD-9-CM: 244.8, 246.8 Chronic obstructive pulmonary disease, unspecified COPD type ([facility]     ICD-10-CM: J44.9 ICD-9-CM: 629 B12 deficiency     ICD-10-CM: E53.8 ICD-9-CM: 266.2 Encounter for immunization     ICD-10-CM: Q04 ICD-9-CM: V03.89 Vitals BP Pulse Temp Height(growth percentile) Weight(growth percentile) 153/57 (BP 1 Location: Left arm, BP Patient Position: Sitting) (!) 54 98.3 °F (36.8 °C) (Temporal) 5' 7\" (1.702 m) 126 lb (57.2 kg) SpO2 BMI OB Status Smoking Status 100% 19.73 kg/m2 Hysterectomy Former Smoker Vitals History BMI and BSA Data Body Mass Index Body Surface Area  
 19.73 kg/m 2 1.64 m 2 Preferred Pharmacy Pharmacy Name Phone [facility] [address] [place] [address] [phone] Your Updated Medication List  
  
   
This list is accurate as of: 12/12/17  2:59 PM.  Always use your most recent med list. amLODIPine 2.5 mg tablet Commonly known as:  Voncile Faith Take 1 Tab by mouth daily. Indications: pressure  
  
 cholecalciferol (VITAMIN D3) 5,000 unit Tab tablet Commonly known as:  VITAMIN D3 Take  by mouth daily. CLARITIN 10 mg tablet Generic drug:  loratadine Take 10 mg by mouth. donepezil 5 mg tablet Commonly known as:  ARICEPT  
TAKE 1 TABLET  NIGHTLY for memory  
  
 fluticasone 50 mcg/actuation nasal spray Commonly known as:  [place] F[place] 2 Sprays by Both Nostrils route daily. omeprazole 40 mg capsule Commonly known as:  PRILOSEC Take 1 capsule by mouth daily. sertraline 50 mg tablet Commonly known as:  ZOLOFT One po qd  
  
 valsartan 160 mg tablet Commonly known as:  DIOVAN  
1/2 tab BID Prescriptions Sent to Pharmacy Refills  
 donepezil (ARICEPT) 5 mg tablet 3 Sig: TAKE 1 TABLET  NIGHTLY for memory Class: Normal  
 Pharmacy: [address] [place] [address], [address] [place] [address] Ph #: [phone] We Performed the Following ADMIN PNEUMOCOCCAL VACCINE [ HCPCS] CBC WITH AUTOMATED DIFF [55261 CPT(R)] METABOLIC PANEL, BASIC [29586 CPT(R)] PNEUMOCOCCAL POLYSACCHARIDE VACCINE, 23-VALENT, ADULT OR IMMUNOSUPPRESSED PT DOSE, [28360 CPT(R)] TSH RFX ON ABNORMAL TO FREE T4 [[id number] Custom] VITAMIN B12 & FOLATE [34653 CPT(R)] Follow-up Instructions Return in about 3 months (around 3/12/2018). Patient Instructions Pneumococcal Polysaccharide Vaccine: Care Instructions Your Care Instructions The pneumococcal polysaccharide vaccine (PPSV) can prevent some of the serious complications of pneumonia. This includes infection in the bloodstream (bacteremia) or throughout the body (septicemia). PPSV is recommended for people ages 72 years and older. People ages 3 to 59 who have a long-term illness should also get the vaccine. This includes people with diabetes, heart disease, liver disease, or lung disease. PPSV can also help people who have a weakened immune system. This includes cancer patients and people who don't have a spleen. The immune system helps your body fight infection and other illnesses. PPSV is given as a shot. It's usually given in the arm. Healthy older adults get the shot once. Other people may need to have a second dose. The shot may cause pain and redness at the site. It may also cause a mild fever for a short time. Follow-up care is a key part of your treatment and safety. Be sure to make and go to all appointments, and call your doctor if you are having problems. It's also a good idea to know your test results and keep a list of the medicines you take. How can you care for yourself at home? · Take an over-the-counter pain medicine, such as acetaminophen (Tylenol), ibuprofen (Advil, Motrin), or naproxen (Aleve), if your arm is sore after the shot. Be safe with medicines. Read and follow all instructions on the label. · Give acetaminophen (Tylenol) or ibuprofen (Advil, Motrin) to your child for pain or fussiness after the shot. Read and follow all instructions on the label. Do not give aspirin to anyone younger than 20. It has been linked to Reye syndrome, a serious illness. · Put ice or a cold pack on the sore area for 10 to 20 minutes at a time. Put a thin cloth between the ice and your skin. When should you call for help? Call 911 anytime you think you may need emergency care. For example, call if: 
? · You have a seizure. ? · You have symptoms of a severe allergic reaction. These may include: 
¨ Sudden raised, red areas (hives) all over the body. ¨ Swelling of the throat, mouth, lips, or tongue. ¨ Trouble breathing. ¨ Passing out (losing consciousness). Or you may feel very lightheaded or suddenly feel weak, confused, or restless. ?Call your doctor now or seek immediate medical care if: 
? · You have symptoms of an allergic reaction, such as: ¨ A rash or hives (raised, red areas on the skin). ¨ Itching. ¨ Swelling. ¨ Belly pain, nausea, or vomiting. ? · You have a high fever. ? Watch closely for changes in your health, and be sure to contact your doctor if you have any problems. Where can you learn more? Go to http://krzysztof-zahraa.info/. Enter T225 in the search box to learn more about \"Pneumococcal Polysaccharide Vaccine: Care Instructions. \" Current as of: September 24, 2016 Content Version: 11.4 © 2015-7893 [employer]. Care instructions adapted under license by [employer] (which disclaims liability or warranty for this information). If you have questions about a medical condition or this instruction, always ask your healthcare professional. [place] [address] any warranty or liability for your use of this information. Introducing [facility]! [facility] introduces [employer] patient portal. Now you can access parts of your medical record, email your doctor's office, and request medication refills online. 1. In your internet browser, go to [email or web address] 2. Click on the First Time User? Click Here link in the Sign In box. You will see the New Member Sign Up page. 3. Enter your [employer] Access Code exactly as it appears below. You will not need to use this code after youve completed the sign-up process. If you do not sign up before the expiration date, you must request a new code. · Davia Access Code: [email or web address] Expires: 1/8/2018  3:03 PM 
 
4. Enter the last four digits of your Social Security Number (xxxx) and Date of Birth (mm/dd/yyyy) as indicated and click Submit. You will be taken to the next sign-up page. 5. Create a Davia ID. This will be your Davia login ID and cannot be changed, so think of one that is secure and easy to remember. 6. Create a Davia password. You can change your password at any time. 7. Enter your Password Reset Question and Answer. This can be used at a later time if you forget your password. 8. Enter your e-mail address. You will receive e-mail notification when new information is available in [address] 9. Click Sign Up. You can now view and download portions of your medical record. 10. Click the Download Summary menu link to download a portable copy of your medical information. If you have questions, please visit the Frequently Asked Questions section of the Davia website. Remember, Davia is NOT to be used for urgent needs. For medical emergencies, dial 911. Now available from your iPhone and Android! Please provide this summary of care documentation to your next provider. Your primary care clinician is listed as Layne Márquez. If you have any questions after today's visit, please call [phone].

## 2017-12-12 NOTE — PATIENT INSTRUCTIONS
Pneumococcal Polysaccharide Vaccine: Care Instructions  Your Care Instructions    The pneumococcal polysaccharide vaccine (PPSV) can prevent some of the serious complications of pneumonia. This includes infection in the bloodstream (bacteremia) or throughout the body (septicemia). PPSV is recommended for people ages 72 years and older. People ages 3 to 59 who have a long-term illness should also get the vaccine. This includes people with diabetes, heart disease, liver disease, or lung disease. PPSV can also help people who have a weakened immune system. This includes cancer patients and people who don't have a spleen. The immune system helps your body fight infection and other illnesses. PPSV is given as a shot. It's usually given in the arm. Healthy older adults get the shot once. Other people may need to have a second dose. The shot may cause pain and redness at the site. It may also cause a mild fever for a short time. Follow-up care is a key part of your treatment and safety. Be sure to make and go to all appointments, and call your doctor if you are having problems. It's also a good idea to know your test results and keep a list of the medicines you take. How can you care for yourself at home? · Take an over-the-counter pain medicine, such as acetaminophen (Tylenol), ibuprofen (Advil, Motrin), or naproxen (Aleve), if your arm is sore after the shot. Be safe with medicines. Read and follow all instructions on the label. · Give acetaminophen (Tylenol) or ibuprofen (Advil, Motrin) to your child for pain or fussiness after the shot. Read and follow all instructions on the label. Do not give aspirin to anyone younger than 20. It has been linked to Reye syndrome, a serious illness. · Put ice or a cold pack on the sore area for 10 to 20 minutes at a time. Put a thin cloth between the ice and your skin. When should you call for help? Call 911 anytime you think you may need emergency care.  For example, call if:  ? · You have a seizure. ? · You have symptoms of a severe allergic reaction. These may include:  ¨ Sudden raised, red areas (hives) all over the body. ¨ Swelling of the throat, mouth, lips, or tongue. ¨ Trouble breathing. ¨ Passing out (losing consciousness). Or you may feel very lightheaded or suddenly feel weak, confused, or restless. ?Call your doctor now or seek immediate medical care if:  ? · You have symptoms of an allergic reaction, such as:  ¨ A rash or hives (raised, red areas on the skin). ¨ Itching. ¨ Swelling. ¨ Belly pain, nausea, or vomiting. ? · You have a high fever. ? Watch closely for changes in your health, and be sure to contact your doctor if you have any problems. Where can you learn more? Go to http://krzysztof-zahraa.info/. Enter T225 in the search box to learn more about \"Pneumococcal Polysaccharide Vaccine: Care Instructions. \"  Current as of: September 24, 2016  Content Version: 11.4  © 8144-2999 [employer]. Care instructions adapted under license by [employer] (which disclaims liability or warranty for this information). If you have questions about a medical condition or this instruction, always ask your healthcare professional. [address] any warranty or liability for your use of this information.

## 2017-12-13 LAB
BASOPHILS # BLD AUTO: 0 X10E3/UL (ref 0–0.2)
BASOPHILS NFR BLD AUTO: 1 %
BUN SERPL-MCNC: 20 MG/DL (ref 8–27)
BUN/CREAT SERPL: 22 (ref 12–28)
CALCIUM SERPL-MCNC: 9.1 MG/DL (ref 8.7–10.3)
CHLORIDE SERPL-SCNC: 104 MMOL/L (ref 96–106)
CO2 SERPL-SCNC: 25 MMOL/L (ref 18–29)
CREAT SERPL-MCNC: 0.91 MG/DL (ref 0.57–1)
EOSINOPHIL # BLD AUTO: 0.2 X10E3/UL (ref 0–0.4)
EOSINOPHIL NFR BLD AUTO: 3 %
ERYTHROCYTE [DISTWIDTH] IN BLOOD BY AUTOMATED COUNT: 13.5 % (ref 12.3–15.4)
FOLATE SERPL-MCNC: 13.8 NG/ML
GFR SERPLBLD CREATININE-BSD FMLA CKD-EPI: 58 ML/MIN/1.73
GFR SERPLBLD CREATININE-BSD FMLA CKD-EPI: 67 ML/MIN/1.73
GLUCOSE SERPL-MCNC: 117 MG/DL (ref 65–99)
HCT VFR BLD AUTO: 33.2 % (ref 34–46.6)
HGB BLD-MCNC: 11 G/DL (ref 11.1–15.9)
IMM GRANULOCYTES # BLD: 0 X10E3/UL (ref 0–0.1)
IMM GRANULOCYTES NFR BLD: 0 %
INTERPRETATION: NORMAL
LYMPHOCYTES # BLD AUTO: 2.2 X10E3/UL (ref 0.7–3.1)
LYMPHOCYTES NFR BLD AUTO: 38 %
MCH RBC QN AUTO: 30 PG (ref 26.6–33)
MCHC RBC AUTO-ENTMCNC: 33.1 G/DL (ref 31.5–35.7)
MCV RBC AUTO: 91 FL (ref 79–97)
MONOCYTES # BLD AUTO: 0.6 X10E3/UL (ref 0.1–0.9)
MONOCYTES NFR BLD AUTO: 10 %
NEUTROPHILS # BLD AUTO: 2.8 X10E3/UL (ref 1.4–7)
NEUTROPHILS NFR BLD AUTO: 48 %
PLATELET # BLD AUTO: 204 X10E3/UL (ref 150–379)
POTASSIUM SERPL-SCNC: 4.2 MMOL/L (ref 3.5–5.2)
RBC # BLD AUTO: 3.67 X10E6/UL (ref 3.77–5.28)
SODIUM SERPL-SCNC: 143 MMOL/L (ref 134–144)
TSH SERPL DL<=0.005 MIU/L-ACNC: 1.33 UIU/ML (ref 0.45–4.5)
VIT B12 SERPL-MCNC: 242 PG/ML (ref 232–1245)
WBC # BLD AUTO: 5.8 X10E3/UL (ref 3.4–10.8)

## 2017-12-13 NOTE — PROGRESS NOTES
Thyroid, kidneys, sugar ok. Still anemia. B12 on the low side. Start a 1000mcg vitamin B12 pill daily (OTC). Please inform.

## 2018-01-29 RX ORDER — SERTRALINE HYDROCHLORIDE 50 MG/1
TABLET, FILM COATED ORAL
Qty: 90 TAB | Refills: 1 | Status: SHIPPED | OUTPATIENT
Start: 2018-01-29 | End: 2018-07-19 | Stop reason: SDUPTHER

## 2018-04-05 ENCOUNTER — OFFICE VISIT (OUTPATIENT)
Dept: FAMILY MEDICINE CLINIC | Age: 83
End: 2018-04-05

## 2018-04-05 VITALS
WEIGHT: 128 LBS | BODY MASS INDEX: 20.09 KG/M2 | SYSTOLIC BLOOD PRESSURE: 140 MMHG | DIASTOLIC BLOOD PRESSURE: 60 MMHG | HEART RATE: 56 BPM | RESPIRATION RATE: 16 BRPM | OXYGEN SATURATION: 99 % | HEIGHT: 67 IN | TEMPERATURE: 97.7 F

## 2018-04-05 DIAGNOSIS — I10 ESSENTIAL HYPERTENSION: ICD-10-CM

## 2018-04-05 DIAGNOSIS — J44.9 CHRONIC OBSTRUCTIVE PULMONARY DISEASE, UNSPECIFIED COPD TYPE (HCC): ICD-10-CM

## 2018-04-05 DIAGNOSIS — Z23 ENCOUNTER FOR IMMUNIZATION: ICD-10-CM

## 2018-04-05 DIAGNOSIS — E03.4 HYPOTHYROIDISM DUE TO ACQUIRED ATROPHY OF THYROID: ICD-10-CM

## 2018-04-05 DIAGNOSIS — R90.82 WHITE MATTER DISEASE: ICD-10-CM

## 2018-04-05 DIAGNOSIS — E53.8 B12 DEFICIENCY: Primary | ICD-10-CM

## 2018-04-05 DIAGNOSIS — G31.84 MCI (MILD COGNITIVE IMPAIRMENT): ICD-10-CM

## 2018-04-05 RX ORDER — LANOLIN ALCOHOL/MO/W.PET/CERES
1000 CREAM (GRAM) TOPICAL DAILY
COMMUNITY
End: 2021-07-27 | Stop reason: SDUPTHER

## 2018-04-05 RX ORDER — ALBUTEROL SULFATE 90 UG/1
2 AEROSOL, METERED RESPIRATORY (INHALATION)
Qty: 1 INHALER | Refills: 2 | Status: SHIPPED | OUTPATIENT
Start: 2018-04-05 | End: 2018-06-12 | Stop reason: SDUPTHER

## 2018-04-05 RX ORDER — DONEPEZIL HYDROCHLORIDE 10 MG/1
TABLET, FILM COATED ORAL
Qty: 90 TAB | Refills: 3 | Status: SHIPPED | OUTPATIENT
Start: 2018-04-05 | End: 2018-10-24 | Stop reason: DRUGHIGH

## 2018-04-05 NOTE — ACP (ADVANCE CARE PLANNING)
Pt has advance directive at home. Recommended to bring by the clinic for inclusion in chart at patient's convenience.  Discussed 4/5/2018

## 2018-04-05 NOTE — MR AVS SNAPSHOT
Angy Galdamez 
 
 
 [address] [facility] [address] 
[phone] Patient: Israel Sarmiento MRN: [id number] [facility]:[birth year] Visit Information Date & Time Provider Department Dept. Phone Encounter #  
 4/5/2018  1:20 PM Sharonda Mercado, [address] [place] [address] [phone] [id number] Follow-up Instructions Return in about 3 months (around 7/5/2018). Follow-up and Disposition History Upcoming Health Maintenance Date Due ZOSTER VACCINE AGE 60> 10/19/1992 GLAUCOMA SCREENING Q2Y 12/19/1997 MEDICARE YEARLY EXAM 10/11/2018 DTaP/Tdap/Td series (2 - Td) 4/5/2028 Allergies as of 4/5/2018  Review Complete On: 4/5/2018 By: Sharonda Mercado MD  
  
 Severity Noted Reaction Type Reactions Pcn [Penicillins]  10/10/2017    Shortness of Breath Current Immunizations  Reviewed on 11/8/2016 Name Date Influenza High Dose Vaccine PF 9/27/2017, 10/14/2016 Influenza Vaccine Autumn Loomis) 11/3/2015 Pneumococcal Conjugate (PCV-13) 11/8/2016 Pneumococcal Polysaccharide (PPSV-23) 12/12/2017 Not reviewed this visit You Were Diagnosed With   
  
 Codes Comments B12 deficiency    -  Primary ICD-10-CM: E53.8 ICD-9-CM: 266.2 Encounter for immunization     ICD-10-CM: R85 ICD-9-CM: V03.89 Chronic obstructive pulmonary disease, unspecified COPD type ([facility]     ICD-10-CM: J44.9 ICD-9-CM: 838 Hypothyroidism due to acquired atrophy of thyroid     ICD-10-CM: E03.4 ICD-9-CM: 244.8, 246.8 Essential hypertension     ICD-10-CM: I10 
ICD-9-CM: 401.9 White matter disease     ICD-10-CM: R90.82 ICD-9-CM: 348.89   
 MCI (mild cognitive impairment)     ICD-10-CM: G31.84 ICD-9-CM: 331.83 Vitals BP Pulse Temp Resp Height(growth percentile) Weight(growth percentile)  140/60 (BP 1 Location: Left arm, BP Patient Position: Sitting) (!) 56 97.7 °F (36.5 °C) (Oral) 16 5' 7\" (1.702 m) 128 lb (58.1 kg) SpO2 BMI OB Status Smoking Status 99% 20.05 kg/m2 Hysterectomy Former Smoker Vitals History BMI and BSA Data Body Mass Index Body Surface Area 20.05 kg/m 2 1.66 m 2 Preferred Pharmacy Pharmacy Name Phone [facility] [address] [place] [address] [facility] [address] [phone] Your Updated Medication List  
  
   
This list is accurate as of 4/5/18  2:27 PM.  Always use your most recent med list.  
  
  
  
  
 albuterol 90 mcg/actuation inhaler Commonly known as:  PROVENTIL HFA, VENTOLIN HFA, PROAIR HFA Take 2 Puffs by inhalation every four (4) hours as needed for Wheezing. amLODIPine 2.5 mg tablet Commonly known as:  Skip Newcomer Take 1 Tab by mouth daily. Indications: pressure  
  
 cholecalciferol (VITAMIN D3) 5,000 unit Tab tablet Commonly known as:  VITAMIN D3 Take  by mouth daily. CLARITIN 10 mg tablet Generic drug:  loratadine Take 10 mg by mouth. donepezil 10 mg tablet Commonly known as:  ARICEPT  
TAKE 1 TABLET  NIGHTLY for memory  
  
 fluticasone 50 mcg/actuation nasal spray Commonly known as:  [place] 2 Sprays by Both Nostrils route daily. omeprazole 40 mg capsule Commonly known as:  PRILOSEC Take 1 capsule by mouth daily. sertraline 50 mg tablet Commonly known as:  ZOLOFT  
TAKE 1 TABLET EVERY DAY  
  
 valsartan 160 mg tablet Commonly known as:  DIOVAN  
1/2 tab BID  
  
 varicella-zoster recombinant (PF) 50 mcg/0.5 mL Susr injection Commonly known as:  SHINGRIX  
0.5 mL by IntraMUSCular route every 6 months for 2 doses. Indications: prevent shingles VITAMIN B-12 1,000 mcg tablet Generic drug:  cyanocobalamin Take 1,000 mcg by mouth daily. Prescriptions Printed  Refills  
 varicella-zoster recombinant, PF, (SHINGRIX) 50 mcg/0.5 mL susr injection 1  
 Si.5 mL by IntraMUSCular route every 6 months for 2 doses. Indications: prevent shingles Class: Print Route: IntraMUSCular Prescriptions Sent to Pharmacy Refills  
 albuterol (PROVENTIL HFA, VENTOLIN HFA, PROAIR HFA) 90 mcg/actuation inhaler 2 Sig: Take 2 Puffs by inhalation every four (4) hours as needed for Wheezing. Class: Normal  
 Pharmacy: [address] [place] [address], [address] [place] [address] Ph #: [phone] Route: Inhalation  
 donepezil (ARICEPT) 10 mg tablet 3 Sig: TAKE 1 TABLET  NIGHTLY for memory Class: Normal  
 Pharmacy: [address] [place] [address], [address] [place] [address] Ph #: [phone] We Performed the Following CBC WITH AUTOMATED DIFF [22573 CPT(R)] VITAMIN B12 & FOLATE [06173 CPT(R)] Follow-up Instructions Return in about 3 months (around 2018). Introducing [facility]! Kasia Rice introduces [employer] patient portal. Now you can access parts of your medical record, email your doctor's office, and request medication refills online. 1. In your internet browser, go to [email or web address] 2. Click on the First Time User? Click Here link in the Sign In box. You will see the New Member Sign Up page. 3. Enter your RadMit Access Code exactly as it appears below. You will not need to use this code after youve completed the sign-up process. If you do not sign up before the expiration date, you must request a new code. · RadMit Access Code: [email or web address] Expires: 2018  2:20 PM 
 
4. Enter the last four digits of your Social Security Number (xxxx) and Date of Birth (mm/dd/yyyy) as indicated and click Submit. You will be taken to the next sign-up page. 5. Create a [facility] ID. This will be your RadMit login ID and cannot be changed, so think of one that is secure and easy to remember. 6. Create a [facility] password. You can change your password at any time. 7. Enter your Password Reset Question and Answer. This can be used at a later time if you forget your password. 8. Enter your e-mail address. You will receive e-mail notification when new information is available in [address] 9. Click Sign Up. You can now view and download portions of your medical record. 10. Click the Download Summary menu link to download a portable copy of your medical information. If you have questions, please visit the Frequently Asked Questions section of the iPinYou website. Remember, iPinYou is NOT to be used for urgent needs. For medical emergencies, dial 911. Now available from your iPhone and Android! Please provide this summary of care documentation to your next provider. Your primary care clinician is listed as Cara Ibrahim. If you have any questions after today's visit, please call [phone].

## 2018-04-05 NOTE — PROGRESS NOTES
Jeanna Helm is a 80 y.o. female and presents for f/u. Hypertension. Blood pressures have been stable. Management at last visit included  Current regimen: angiotensin II receptor antagonist and beta-blocker. Symptoms include tiredness/fatigue. Patient denies chest pain, palpitations, peripheral edema. Lab review:   Lab Results   Component Value Date/Time    Sodium 143 12/12/2017 02:56 PM    Potassium 4.2 12/12/2017 02:56 PM    Chloride 104 12/12/2017 02:56 PM    CO2 25 12/12/2017 02:56 PM    Glucose 117 (H) 12/12/2017 02:56 PM    BUN 20 12/12/2017 02:56 PM    Creatinine 0.91 12/12/2017 02:56 PM    BUN/Creatinine ratio 22 12/12/2017 02:56 PM    GFR est AA 67 12/12/2017 02:56 PM    GFR est non-AA 58 (L) 12/12/2017 02:56 PM    Calcium 9.1 12/12/2017 02:56 PM     Memory loss with white matter disease  Has been stable on aricept 5mg every day, ej well, but still having a lot of problems with feeling forgetful. No wandering, no walking the floors. PMH, SH, Medications/Allergies: reviewed, on chart.     ROS:  Constitutional: No fever, chills or weight loss  Respiratory: Mild SOB with activity with some productive cough  CV: No chest pain or Palpitations    Visit Vitals    /60 (BP 1 Location: Left arm, BP Patient Position: Sitting)    Pulse (!) 56    Temp 97.7 °F (36.5 °C) (Oral)    Resp 16    Ht 5' 7\" (1.702 m)    Wt 128 lb (58.1 kg)    SpO2 99%    BMI 20.05 kg/m2     Wt Readings from Last 3 Encounters:   04/05/18 128 lb (58.1 kg)   12/12/17 126 lb (57.2 kg)   10/10/17 122 lb (55.3 kg)     Physical Examination: General appearance - alert, well appearing, and in no distress  Mental status - alert, oriented to person, place, and time  Eyes - pupils equal and reactive, extraocular eye movements intact  ENT - bilateral external ears and nose normal. Normal lips  Neck - supple, no significant adenopathy, no thyromegaly or mass  Lymphatics - no palpable lymphadenopathy, no hepatosplenomegaly  Chest - clear to auscultation, no wheezes, rales or rhonchi, symmetric air entry  Heart - normal rate, regular rhythm, normal S1, S2, no murmurs, rubs, clicks or gallops  Extremities - peripheral pulses normal, no pedal edema, no clubbing or cyanosis  Neuro- Mini-cog: abn Clock, 1/3 recall    A/P:  HTN  Still a little bradycardic, but feeling better. con't current tx. Hypothyroid  well controlled. con't current tx. Check lab, adjust meds PRN    B12 def  A little low last check. Now on supplement 1 daily. Recheck B12 level and CBC, adjust PRN. White matter dz  Try boost aricept to 10mg daily. Hx Smoking and occ dyspnea  Lungs clear today. Will ore albuterol HFA , use QID PRN cough or dyspnea. HCM  Discussed DXA, thinks had at [facility]. Try again to get records. Discussed shingrix- ordered. ACP discussed. Has a POA. Pt asked to bring to ofc to scan in.     F/U 3mo

## 2018-04-06 LAB
BASOPHILS # BLD AUTO: 0 X10E3/UL (ref 0–0.2)
BASOPHILS NFR BLD AUTO: 1 %
EOSINOPHIL # BLD AUTO: 0.2 X10E3/UL (ref 0–0.4)
EOSINOPHIL NFR BLD AUTO: 4 %
ERYTHROCYTE [DISTWIDTH] IN BLOOD BY AUTOMATED COUNT: 13.6 % (ref 12.3–15.4)
FOLATE SERPL-MCNC: 15.9 NG/ML
HCT VFR BLD AUTO: 35.4 % (ref 34–46.6)
HGB BLD-MCNC: 11.8 G/DL (ref 11.1–15.9)
IMM GRANULOCYTES # BLD: 0 X10E3/UL (ref 0–0.1)
IMM GRANULOCYTES NFR BLD: 0 %
LYMPHOCYTES # BLD AUTO: 2 X10E3/UL (ref 0.7–3.1)
LYMPHOCYTES NFR BLD AUTO: 32 %
MCH RBC QN AUTO: 30.6 PG (ref 26.6–33)
MCHC RBC AUTO-ENTMCNC: 33.3 G/DL (ref 31.5–35.7)
MCV RBC AUTO: 92 FL (ref 79–97)
MONOCYTES # BLD AUTO: 0.6 X10E3/UL (ref 0.1–0.9)
MONOCYTES NFR BLD AUTO: 10 %
NEUTROPHILS # BLD AUTO: 3.4 X10E3/UL (ref 1.4–7)
NEUTROPHILS NFR BLD AUTO: 53 %
PLATELET # BLD AUTO: 210 X10E3/UL (ref 150–379)
RBC # BLD AUTO: 3.85 X10E6/UL (ref 3.77–5.28)
VIT B12 SERPL-MCNC: 478 PG/ML (ref 232–1245)
WBC # BLD AUTO: 6.3 X10E3/UL (ref 3.4–10.8)

## 2018-06-12 DIAGNOSIS — J44.9 CHRONIC OBSTRUCTIVE PULMONARY DISEASE, UNSPECIFIED COPD TYPE (HCC): ICD-10-CM

## 2018-06-12 NOTE — TELEPHONE ENCOUNTER
Patient never received the Proventil Dr. Carina Banegas ordered through [facility] [employer] mail order in April. Can we get Dr. Carina Banegas to re-send the prescription?

## 2018-06-13 RX ORDER — ALBUTEROL SULFATE 90 UG/1
2 AEROSOL, METERED RESPIRATORY (INHALATION)
Qty: 3 INHALER | Refills: 3 | Status: SHIPPED | OUTPATIENT
Start: 2018-06-13 | End: 2018-10-25 | Stop reason: SDUPTHER

## 2018-07-19 ENCOUNTER — OFFICE VISIT (OUTPATIENT)
Dept: FAMILY MEDICINE CLINIC | Age: 83
End: 2018-07-19

## 2018-07-19 VITALS
TEMPERATURE: 97.3 F | DIASTOLIC BLOOD PRESSURE: 70 MMHG | HEART RATE: 60 BPM | WEIGHT: 124.2 LBS | SYSTOLIC BLOOD PRESSURE: 122 MMHG | BODY MASS INDEX: 19.49 KG/M2 | RESPIRATION RATE: 14 BRPM | OXYGEN SATURATION: 97 % | HEIGHT: 67 IN

## 2018-07-19 DIAGNOSIS — M81.0 AGE-RELATED OSTEOPOROSIS WITHOUT CURRENT PATHOLOGICAL FRACTURE: ICD-10-CM

## 2018-07-19 DIAGNOSIS — I10 ESSENTIAL HYPERTENSION: ICD-10-CM

## 2018-07-19 DIAGNOSIS — R90.82 WHITE MATTER DISEASE: ICD-10-CM

## 2018-07-19 DIAGNOSIS — J44.9 CHRONIC OBSTRUCTIVE PULMONARY DISEASE, UNSPECIFIED COPD TYPE (HCC): ICD-10-CM

## 2018-07-19 DIAGNOSIS — Z78.0 POSTMENOPAUSAL: ICD-10-CM

## 2018-07-19 DIAGNOSIS — E53.8 B12 DEFICIENCY: ICD-10-CM

## 2018-07-19 DIAGNOSIS — G31.84 MCI (MILD COGNITIVE IMPAIRMENT): Primary | ICD-10-CM

## 2018-07-19 RX ORDER — SERTRALINE HYDROCHLORIDE 50 MG/1
TABLET, FILM COATED ORAL
Qty: 90 TAB | Refills: 3 | Status: SHIPPED | OUTPATIENT
Start: 2018-07-19 | End: 2018-10-25 | Stop reason: SDUPTHER

## 2018-07-19 NOTE — PROGRESS NOTES
Dirk Amaral is a 80 y.o. female and presents for f/u. Hypertension. Blood pressures have been stable. Management at last visit included d/c B-blocker and start CCB. Current regimen: angiotensin II receptor antagonist, CCB. Symptoms include tiredness/fatigue. Patient denies chest pain, palpitations, peripheral edema. Lab review:   Lab Results   Component Value Date/Time    Sodium 143 12/12/2017 02:56 PM    Potassium 4.2 12/12/2017 02:56 PM    Chloride 104 12/12/2017 02:56 PM    CO2 25 12/12/2017 02:56 PM    Glucose 117 (H) 12/12/2017 02:56 PM    BUN 20 12/12/2017 02:56 PM    Creatinine 0.91 12/12/2017 02:56 PM    BUN/Creatinine ratio 22 12/12/2017 02:56 PM    GFR est AA 67 12/12/2017 02:56 PM    GFR est non-AA 58 (L) 12/12/2017 02:56 PM    Calcium 9.1 12/12/2017 02:56 PM     Memory loss with white matter disease  Has been stable on aricept 5mg every day, tried boost to 10mg daily, but had more nausea, so went back to 5mg, ej well. Sertraline 50mg daily. ej well. Still having a lot of problems with feeling forgetful. No wandering, no walking the floors. Results for orders placed or performed in visit on 04/05/18   VITAMIN B12 & FOLATE   Result Value Ref Range    Vitamin B12 478 232 - 1245 pg/mL    Folate 15.9 >3.0 ng/mL     PMH, SH, Medications/Allergies: reviewed, on chart.     ROS:  Constitutional: No fever, chills or weight loss  Respiratory: Mild SOB with activity with some productive cough  CV: No chest pain or Palpitations    Visit Vitals    /70 (BP 1 Location: Left arm, BP Patient Position: Sitting)    Pulse 60    Temp 97.3 °F (36.3 °C) (Temporal)    Resp 14    Ht 5' 7\" (1.702 m)    Wt 124 lb 3.2 oz (56.3 kg)    SpO2 97%    BMI 19.45 kg/m2     Wt Readings from Last 3 Encounters:   07/19/18 124 lb 3.2 oz (56.3 kg)   04/05/18 128 lb (58.1 kg)   12/12/17 126 lb (57.2 kg)   -4#    Physical Examination: General appearance - alert, well appearing, and in no distress  Mental status - alert, oriented to person, place, and time  Eyes - pupils equal and reactive, extraocular eye movements intact  ENT - bilateral external ears and nose normal. Normal lips  Neck - supple, no significant adenopathy, no thyromegaly or mass  Lymphatics - no palpable lymphadenopathy, no hepatosplenomegaly  Chest - clear to auscultation, no wheezes, rales or rhonchi, symmetric air entry  Heart - normal rate, regular rhythm, normal S1, S2, no murmurs, rubs, clicks or gallops  Extremities - peripheral pulses normal, no pedal edema, no clubbing or cyanosis  Neuro- Mini-cog: abn Clock, 1/3 recall    A/P:  HTN  Bradycardia resolved with d/c b-blocker. Doing fine on CCB and ARB, but is on valsartan, pt will check if from affected MFR's and let us know, if so, plan change to irbesartan 75mg BID for next couple mo. B12 def  Ok on last check. con't supplement 1 daily. White matter dz  Doing ok on the aricept 5mg daily. con't that dose. Hx Smoking and occ dyspnea  Lungs clear today. Con't albuterol HFA QID PRN cough or dyspnea. HCM  Discussed DXA, not there at [facility]. Ordered. Discussed shingrix- has order. ACP discussed. Has a POA. Pt asked to bring to ofc to scan in.     F/U 3mo

## 2018-07-19 NOTE — MR AVS SNAPSHOT
Mejia Rojas 
 
 
 [address] 
[phone] Patient: Jey Torres MRN: [id number] Visit Information Date & Time Provider Department Dept. Phone Encounter #  
 7/19/2018  1:00 PM Marc Ceron, [address] [place] [address] 58 [id number] Follow-up Instructions Return in about 3 months (around 10/19/2018). Follow-up and Disposition History Upcoming Health Maintenance Date Due ZOSTER VACCINE AGE 60> 10/19/2018* Influenza Age 5 to Adult 8/1/2018 MEDICARE YEARLY EXAM 10/11/2018 GLAUCOMA SCREENING Q2Y 3/21/2019 DTaP/Tdap/Td series (2 - Td) 4/5/2028 *Topic was postponed. The date shown is not the original due date. Allergies as of 7/19/2018  Review Complete On: 7/19/2018 By: Marc Ceron MD  
  
 Severity Noted Reaction Type Reactions Pcn [Penicillins]  10/10/2017    Shortness of Breath Current Immunizations  Reviewed on 11/8/2016 Name Date Influenza High Dose Vaccine PF 9/27/2017, 10/14/2016 Influenza Vaccine Ricki Me) 11/3/2015 Pneumococcal Conjugate (PCV-13) 11/8/2016 Pneumococcal Polysaccharide (PPSV-23) 12/12/2017 Not reviewed this visit You Were Diagnosed With   
  
 Codes Comments MCI (mild cognitive impairment)    -  Primary ICD-10-CM: G31.84 ICD-9-CM: 331.83 White matter disease     ICD-10-CM: R90.82 ICD-9-CM: 348.89 Chronic obstructive pulmonary disease, unspecified COPD type ([facility]     ICD-10-CM: J44.9 ICD-9-CM: 422 B12 deficiency     ICD-10-CM: E53.8 ICD-9-CM: 266.2 Essential hypertension     ICD-10-CM: I10 
ICD-9-CM: 401.9 Postmenopausal     ICD-10-CM: Z78.0 ICD-9-CM: V49.81 Vitals BP Pulse Temp Resp Height(growth percentile) 122/70 (BP 1 Location: Left arm, BP Patient Position: Sitting) 60 97.3 °F (36.3 °C) (Temporal) 14 5' 7\" (1.702 m) Weight(growth percentile) SpO2 BMI OB Status Smoking Status 124 lb 3.2 oz (56.3 kg) 97% 19.45 kg/m2 Hysterectomy Former Smoker BMI and BSA Data Body Mass Index Body Surface Area  
 19.45 kg/m 2 1.63 m 2 Preferred Pharmacy Pharmacy Name Phone [facility] [address] [place] [address] - [address] [place] [address] [address] [phone] Your Updated Medication List  
  
   
This list is accurate as of 7/19/18  1:37 PM.  Always use your most recent med list.  
  
  
  
  
 albuterol 90 mcg/actuation inhaler Commonly known as:  PROVENTIL HFA, VENTOLIN HFA, PROAIR HFA Take 2 Puffs by inhalation every four (4) hours as needed for Wheezing. amLODIPine 2.5 mg tablet Commonly known as:  Benji Presser Take 1 Tab by mouth daily. Indications: pressure  
  
 cholecalciferol (VITAMIN D3) 5,000 unit Tab tablet Commonly known as:  VITAMIN D3 Take  by mouth daily. CLARITIN 10 mg tablet Generic drug:  loratadine Take 10 mg by mouth. donepezil 10 mg tablet Commonly known as:  ARICEPT  
TAKE 1 TABLET  NIGHTLY for memory  
  
 fluticasone 50 mcg/actuation nasal spray Commonly known as:  Euna Santhosh 2 Sprays by Both Nostrils route daily. sertraline 50 mg tablet Commonly known as:  ZOLOFT  
TAKE 1 TABLET EVERY DAY for nerves and memory  
  
 valsartan 160 mg tablet Commonly known as:  DIOVAN  
1/2 tab BID  
  
 varicella-zoster recombinant (PF) 50 mcg/0.5 mL Susr injection Commonly known as:  SHINGRIX  
0.5 mL by IntraMUSCular route every 6 months for 2 doses. Indications: prevent shingles VITAMIN B-12 1,000 mcg tablet Generic drug:  cyanocobalamin Take 1,000 mcg by mouth daily. Prescriptions Sent to Pharmacy Refills  
 sertraline (ZOLOFT) 50 mg tablet 3 Sig: TAKE 1 TABLET EVERY DAY for nerves and memory Class: Normal  
 Pharmacy: [address] [place] [address], [address]  #: [phone] Follow-up Instructions Return in about 3 months (around 10/19/2018). To-Do List   
 07/19/2018 Imaging:  DEXA BONE DENSITY STUDY AXIAL Introducing [facility]! [employer] introduces [employer] patient portal. Now you can access parts of your medical record, email your doctor's office, and request medication refills online. 1. In your internet browser, go to [email or web address] 2. Click on the First Time User? Click Here link in the Sign In box. You will see the New Member Sign Up page. 3. Enter your Sciod[employer] Access Code exactly as it appears below. You will not need to use this code after youve completed the sign-up process. If you do not sign up before the expiration date, you must request a new code. · Teepix Access Code: [facility] Expires: 10/17/2018  1:37 PM 
 
4. Enter the last four digits of your Social Security Number (xxxx) and Date of Birth (mm/dd/yyyy) as indicated and click Submit. You will be taken to the next sign-up page. 5. Create a Teepix ID. This will be your Teepix login ID and cannot be changed, so think of one that is secure and easy to remember. 6. Create a Teepix password. You can change your password at any time. 7. Enter your Password Reset Question and Answer. This can be used at a later time if you forget your password. 8. Enter your e-mail address. You will receive e-mail notification when new information is available in [address] 9. Click Sign Up. You can now view and download portions of your medical record. 10. Click the Download Summary menu link to download a portable copy of your medical information. If you have questions, please visit the Frequently Asked Questions section of the Te[employer] website. Remember, Te[employer] is NOT to be used for urgent needs. For medical emergencies, dial 911. Now available from your iPhone and Android! Please provide this summary of care documentation to your next provider. Your primary care clinician is listed as Penny Lee. If you have any questions after today's visit, please call [phone].

## 2018-07-25 PROBLEM — M81.0 AGE-RELATED OSTEOPOROSIS WITHOUT CURRENT PATHOLOGICAL FRACTURE: Status: ACTIVE | Noted: 2018-07-25

## 2018-07-25 RX ORDER — ALENDRONATE SODIUM 70 MG/1
70 TABLET ORAL
Qty: 12 TAB | Refills: 3 | Status: SHIPPED | OUTPATIENT
Start: 2018-07-25 | End: 2019-03-01 | Stop reason: SDUPTHER

## 2018-10-24 PROBLEM — R00.1 BRADYCARDIA: Status: ACTIVE | Noted: 2018-10-24

## 2018-10-24 PROBLEM — N30.00 ACUTE CYSTITIS WITHOUT HEMATURIA: Status: ACTIVE | Noted: 2018-10-24

## 2019-01-31 PROBLEM — R42 DIZZINESS: Status: ACTIVE | Noted: 2019-01-31

## 2019-05-19 PROBLEM — F01.50 VASCULAR DEMENTIA WITHOUT BEHAVIORAL DISTURBANCE (HCC): Status: ACTIVE | Noted: 2019-05-19

## 2019-05-20 ENCOUNTER — HOME HEALTH ADMISSION (OUTPATIENT)
Dept: HOME HEALTH SERVICES | Facility: HOME HEALTH | Age: 84
End: 2019-05-20
Payer: MEDICARE

## 2019-05-21 ENCOUNTER — HOME CARE VISIT (OUTPATIENT)
Dept: SCHEDULING | Facility: HOME HEALTH | Age: 84
End: 2019-05-21
Payer: MEDICARE

## 2019-05-21 PROCEDURE — 400013 HH SOC

## 2019-05-21 PROCEDURE — 3331090002 HH PPS REVENUE DEBIT

## 2019-05-21 PROCEDURE — G0299 HHS/HOSPICE OF RN EA 15 MIN: HCPCS

## 2019-05-21 PROCEDURE — 3331090001 HH PPS REVENUE CREDIT

## 2019-05-22 VITALS
DIASTOLIC BLOOD PRESSURE: 62 MMHG | SYSTOLIC BLOOD PRESSURE: 90 MMHG | HEART RATE: 60 BPM | HEIGHT: 67 IN | TEMPERATURE: 97.8 F | WEIGHT: 124 LBS | OXYGEN SATURATION: 98 % | RESPIRATION RATE: 18 BRPM | BODY MASS INDEX: 19.46 KG/M2

## 2019-05-22 PROCEDURE — 3331090002 HH PPS REVENUE DEBIT

## 2019-05-22 PROCEDURE — 3331090001 HH PPS REVENUE CREDIT

## 2019-05-23 PROCEDURE — 3331090001 HH PPS REVENUE CREDIT

## 2019-05-23 PROCEDURE — 3331090002 HH PPS REVENUE DEBIT

## 2019-05-24 ENCOUNTER — HOME CARE VISIT (OUTPATIENT)
Dept: HOME HEALTH SERVICES | Facility: HOME HEALTH | Age: 84
End: 2019-05-24
Payer: MEDICARE

## 2019-05-24 PROCEDURE — 3331090002 HH PPS REVENUE DEBIT

## 2019-05-24 PROCEDURE — 3331090001 HH PPS REVENUE CREDIT

## 2019-05-25 PROCEDURE — 3331090002 HH PPS REVENUE DEBIT

## 2019-05-25 PROCEDURE — 3331090001 HH PPS REVENUE CREDIT

## 2019-05-26 PROCEDURE — 3331090002 HH PPS REVENUE DEBIT

## 2019-05-26 PROCEDURE — 3331090001 HH PPS REVENUE CREDIT

## 2019-05-27 ENCOUNTER — HOME CARE VISIT (OUTPATIENT)
Dept: SCHEDULING | Facility: HOME HEALTH | Age: 84
End: 2019-05-27
Payer: MEDICARE

## 2019-05-27 PROCEDURE — 3331090001 HH PPS REVENUE CREDIT

## 2019-05-27 PROCEDURE — 3331090003 HH PPS REVENUE ADJ

## 2019-05-27 PROCEDURE — G0299 HHS/HOSPICE OF RN EA 15 MIN: HCPCS

## 2019-05-27 PROCEDURE — 3331090002 HH PPS REVENUE DEBIT

## 2019-05-28 VITALS
RESPIRATION RATE: 18 BRPM | SYSTOLIC BLOOD PRESSURE: 138 MMHG | HEART RATE: 83 BPM | OXYGEN SATURATION: 98 % | DIASTOLIC BLOOD PRESSURE: 82 MMHG | TEMPERATURE: 97.7 F

## 2019-06-18 PROBLEM — N18.30 CKD (CHRONIC KIDNEY DISEASE) STAGE 3, GFR 30-59 ML/MIN (HCC): Status: ACTIVE | Noted: 2019-06-18

## 2019-07-23 PROBLEM — M54.2 NECK PAIN: Status: ACTIVE | Noted: 2019-07-23

## 2019-08-06 PROBLEM — L85.3 DRY SKIN DERMATITIS: Status: ACTIVE | Noted: 2019-08-06

## 2019-10-15 ENCOUNTER — OFFICE VISIT (OUTPATIENT)
Dept: CARDIOLOGY CLINIC | Age: 84
End: 2019-10-15

## 2019-10-15 VITALS
WEIGHT: 132 LBS | DIASTOLIC BLOOD PRESSURE: 84 MMHG | OXYGEN SATURATION: 100 % | RESPIRATION RATE: 14 BRPM | BODY MASS INDEX: 20.72 KG/M2 | SYSTOLIC BLOOD PRESSURE: 190 MMHG | HEIGHT: 67 IN | HEART RATE: 54 BPM

## 2019-10-15 DIAGNOSIS — F01.50 VASCULAR DEMENTIA WITHOUT BEHAVIORAL DISTURBANCE (HCC): ICD-10-CM

## 2019-10-15 DIAGNOSIS — R90.82 WHITE MATTER DISEASE: ICD-10-CM

## 2019-10-15 DIAGNOSIS — N18.30 CKD (CHRONIC KIDNEY DISEASE) STAGE 3, GFR 30-59 ML/MIN (HCC): ICD-10-CM

## 2019-10-15 DIAGNOSIS — J44.9 CHRONIC OBSTRUCTIVE PULMONARY DISEASE, UNSPECIFIED COPD TYPE (HCC): ICD-10-CM

## 2019-10-15 DIAGNOSIS — I10 ESSENTIAL HYPERTENSION: ICD-10-CM

## 2019-10-15 DIAGNOSIS — R42 DIZZINESS: ICD-10-CM

## 2019-10-15 DIAGNOSIS — R00.1 BRADYCARDIA: Primary | ICD-10-CM

## 2019-10-15 RX ORDER — HYDROCHLOROTHIAZIDE 12.5 MG/1
12.5 TABLET ORAL DAILY
Qty: 30 TAB | Refills: 2 | Status: SHIPPED | OUTPATIENT
Start: 2019-10-15 | End: 2019-11-15 | Stop reason: SDUPTHER

## 2019-10-15 NOTE — PROGRESS NOTES
Verified patient with two patient identifiers. Medications reviewed/approved by Dr. Kingsley Long. A verbal order from Dr. Kingsley Long was received with [facility]B to remove any medications that were deleted during the visit. Medication(s) removed:  None    Chief Complaint   Patient presents with    Slow Heart Rate     New patient evaluation - referred by DO Brando    Dizziness     1. Have you been to the ER, urgent care clinic since your last visit? Hospitalized since your last visit? new pt.    2. Have you seen or consulted any other health care providers outside of the [address] [place] [address] since your last visit? Include any pap smears or colon screening. New pt.

## 2019-10-15 NOTE — PROGRESS NOTES
Katharina Mrofin is a 80 y.o. female is here for cardiac evaluation--bradycardia. dizzy more frequently, and has had a fall about once a week for the past few weeks. In total she is had about 2 or 3 falls since she was last here. Seen by Dr. Nichole Noriega 9/25/19--EKG with sinus kenzie HR 49, labs checked. Previously on amlodipine and valsartan--stopped. BP now elevated. Daughter states that many of these falls seem to happen outside and the patient has stated that she feels dizziness prior to each of these. For instance, the daughter states that her mother (the patient) went outside to pull weeds and feels like her mother may have been overheated, as when her mother was coming back and she fell to the floor and hit her head in the doorway. No nausea, vomiting, loss of consciousness, syncope, tunnel vision, diaphoresis, shortness of breath or difficulty breathing, chest pain or pressure or tightness.    Also daughter feels that her mother's memory may not be as good as normal.  She has been on Aricept for some time, and at one point time had been on 10 mg was having frequent headaches with this so this was decreased to 5 mg. The patient denies chest pain/ shortness of breath, orthopnea, PND, LE edema, palpitations. .       Patient Active Problem List    Diagnosis Date Noted    Dry skin dermatitis 08/06/2019    Neck pain 07/23/2019    CKD (chronic kidney disease) stage 3, GFR 30-59 ml/min ([address] 06/18/2019    Vascular dementia without behavioral disturbance ([facility] 05/19/2019    Dizziness 01/31/2019    Acute cystitis without hematuria 10/24/2018    Bradycardia 10/24/2018    Age-related osteoporosis without current pathological fracture 07/25/2018    COPD (chronic obstructive pulmonary disease) ([facility]     HTN (hypertension)     B12 deficiency 01/15/2014    MCI (mild cognitive impairment) 09/17/2013    White matter disease 09/17/2013      Carmen Larsen DO  Past Medical History:   Diagnosis Date    COPD (chronic obstructive pulmonary disease) (HCC)     GERD (gastroesophageal reflux disease)     HTN (hypertension)     Hypercholesterolemia     MCI (mild cognitive impairment)       Past Surgical History:   Procedure Laterality Date    HX COLONOSCOPY      has had several    HX MARLEN AND BSO       Allergies   Allergen Reactions    Pcn [Penicillins] Shortness of Breath      Family History   Problem Relation Age of Onset    Cancer Mother     Cancer Father     Stroke Sister     Cancer Sister       Social History     Socioeconomic History    Marital status:      Spouse name: Not on file    Number of children: Not on file    Years of education: Not on file    Highest education level: Not on file   Occupational History    Not on file   Social Needs    Financial resource strain: Not on file    Food insecurity:     Worry: Not on file     Inability: Not on file    Transportation needs:     Medical: Not on file     Non-medical: Not on file   Tobacco Use    Smoking status: Former Smoker     Packs/day: 1.00     Years: 33.00     Pack years: 33.00     Types: Cigarettes     Start date: 1952     Last attempt to quit: 1985     Years since quittin.8    Smokeless tobacco: Never Used    Tobacco comment: \"Quit many years ago\"   Substance and Sexual Activity    Alcohol use: No    Drug use: No    Sexual activity: Not on file   Lifestyle    Physical activity:     Days per week: Not on file     Minutes per session: Not on file    Stress: Not on file   Relationships    Social connections:     Talks on phone: Not on file     Gets together: Not on file     Attends Christian service: Not on file     Active member of club or organization: Not on file     Attends meetings of clubs or organizations: Not on file     Relationship status: Not on file    Intimate partner violence:     Fear of current or ex partner: Not on file     Emotionally abused: Not on file     Physically abused: Not on file Forced sexual activity: Not on file   Other Topics Concern     Service No    Blood Transfusions No    Caffeine Concern No    Occupational Exposure No    Hobby Hazards No    Sleep Concern No    Stress Concern No    Weight Concern No    Special Diet No    Back Care No    Exercise No    Bike Helmet Not Asked    Seat Belt Yes    Self-Exams Yes   Social History Narrative    Not on file      Current Outpatient Medications   Medication Sig    memantine (NAMENDA) 5 mg tablet Take 1 Tab by mouth daily.  donepezil (ARICEPT) 5 mg tablet Take 1 Tab by mouth nightly.  sertraline (ZOLOFT) 50 mg tablet TAKE 1 TABLET EVERY DAY for nerves and memory (Patient taking differently: Take 100 mg by mouth daily. TAKE 1 TABLET by mouth  EVERY DAY for nerves and memory)    mineral oil-isopropyl myristat (HYDROCERIN) lotion Apply  to affected area as needed for Dry Skin.  Walker (ULTRA-LIGHT ROLLATOR) misc 1 Units by Does Not Apply route daily. Dx:  At risk for falls, frailty, decreased stamina    alendronate (FOSAMAX) 70 mg tablet Take 1 Tab by mouth every seven (7) days.  loratadine (CLARITIN) 10 mg tablet Take 1 Tab by mouth daily as needed for Allergies.  albuterol (PROVENTIL HFA, VENTOLIN HFA, PROAIR HFA) 90 mcg/actuation inhaler Take 2 Puffs by inhalation every four (4) hours as needed for Wheezing.  cyanocobalamin (VITAMIN B-12) 1,000 mcg tablet Take 1,000 mcg by mouth daily.  fluticasone (FLONASE) 50 mcg/actuation nasal spray 2 Sprays by Both Nostrils route daily. No current facility-administered medications for this visit. Review of Symptoms:    CONST  No weight change. No fever, chills, sweats    ENT No visual changes, URI sx, sore throat    CV  See HPI   RESP  No cough, or sputum, wheezing. Also see HPI   GI  No abdominal pain or change in bowel habits. No heartburn or dysphagia. No melena or rectal bleeding.       No dysuria, urgency, frequency, hematuria   MSKEL  No joint pain, swelling. No muscle pain. SKIN  No rash or lesions. NEURO  No headache, syncope, or seizure. No weakness, loss of sensation, or paresthesias. PSYCH  No low mood or depression  No anxiety. HE/LYMPH  No easy bruising, abnormal bleeding, or enlarged glands.         Physical ExamPhysical Exam:    Visit Vitals  /80 (BP 1 Location: Left arm, BP Patient Position: Sitting)   Pulse (!) 54   Resp 14   Ht 5' 7\" (1.702 m)   Wt 132 lb (59.9 kg)   SpO2 100% Comment: ra   BMI 20.67 kg/m²     Gen: NAD  HEENT:  PERRL, throat clear  Neck: no adenopathy, no thyromegaly, no JVD   Heart:  Regular,Nl S1S2,  I/VI murmur, gallop or rub.   Lungs:  clear  Abdomen:   Soft, non-tender, bowel sounds are active.   Extremities:  No edema  Pulse: symmetric  Neuro: A&O times 3, No focal neuro deficits    Cardiographics    ECG: from 9/25/19--sinus kenzie 49, NSST    Labs:   Lab Results   Component Value Date/Time    Sodium 144 05/17/2019 11:46 AM    Sodium 141 10/04/2018 10:57 AM    Sodium 143 09/24/2018 06:00 PM    Sodium 143 12/12/2017 02:56 PM    Sodium 143 11/08/2016 08:45 AM    Potassium 4.2 05/17/2019 11:46 AM    Potassium 4.1 10/04/2018 10:57 AM    Potassium 3.6 09/24/2018 06:00 PM    Potassium 4.2 12/12/2017 02:56 PM    Potassium 5.2 11/08/2016 08:45 AM    Chloride 108 (H) 05/17/2019 11:46 AM    Chloride 102 10/04/2018 10:57 AM    Chloride 106 09/24/2018 06:00 PM    Chloride 104 12/12/2017 02:56 PM    Chloride 106 11/08/2016 08:45 AM    CO2 20 05/17/2019 11:46 AM    CO2 20 10/04/2018 10:57 AM    CO2 25 09/24/2018 06:00 PM    CO2 25 12/12/2017 02:56 PM    CO2 24 11/08/2016 08:45 AM    Anion gap 12 09/24/2018 06:00 PM    Glucose 100 (H) 05/17/2019 11:46 AM    Glucose 46 (L) 10/04/2018 10:57 AM    Glucose 99 09/24/2018 06:00 PM    Glucose 117 (H) 12/12/2017 02:56 PM    Glucose 88 11/08/2016 08:45 AM    BUN 17 05/17/2019 11:46 AM    BUN 21 10/04/2018 10:57 AM    BUN 20 09/24/2018 06:00 PM    BUN 20 12/12/2017 02:56 PM    BUN 13 11/08/2016 08:45 AM    Creatinine 1.17 (H) 05/17/2019 11:46 AM    Creatinine 0.99 10/04/2018 10:57 AM    Creatinine 1.23 (H) 09/24/2018 06:00 PM    Creatinine 0.91 12/12/2017 02:56 PM    Creatinine 0.99 11/08/2016 08:45 AM    BUN/Creatinine ratio 15 05/17/2019 11:46 AM    BUN/Creatinine ratio 21 10/04/2018 10:57 AM    BUN/Creatinine ratio 16 09/24/2018 06:00 PM    BUN/Creatinine ratio 22 12/12/2017 02:56 PM    BUN/Creatinine ratio 13 11/08/2016 08:45 AM    GFR est AA 49 (L) 05/17/2019 11:46 AM    GFR est AA 60 10/04/2018 10:57 AM    GFR est AA 50 (L) 09/24/2018 06:00 PM    GFR est AA 67 12/12/2017 02:56 PM    GFR est AA 61 11/08/2016 08:45 AM    GFR est non-AA 42 (L) 05/17/2019 11:46 AM    GFR est non-AA 52 (L) 10/04/2018 10:57 AM    GFR est non-AA 42 (L) 09/24/2018 06:00 PM    GFR est non-AA 58 (L) 12/12/2017 02:56 PM    GFR est non-AA 53 (L) 11/08/2016 08:45 AM    Calcium 9.7 05/17/2019 11:46 AM    Calcium 9.3 10/04/2018 10:57 AM    Calcium 9.6 09/24/2018 06:00 PM    Calcium 9.1 12/12/2017 02:56 PM    Calcium 9.2 11/08/2016 08:45 AM    Bilirubin, total 0.2 05/17/2019 11:46 AM    Bilirubin, total 0.3 10/04/2018 10:57 AM    Bilirubin, total 0.4 09/24/2018 06:00 PM    Bilirubin, total 0.4 11/08/2016 08:45 AM    Bilirubin, total 0.3 11/01/2016 02:39 PM    AST (SGOT) 21 05/17/2019 11:46 AM    AST (SGOT) 23 10/04/2018 10:57 AM    AST (SGOT) 25 09/24/2018 06:00 PM    AST (SGOT) 25 11/08/2016 08:45 AM    AST (SGOT) 26 11/01/2016 02:39 PM    Alk. phosphatase 61 05/17/2019 11:46 AM    Alk. phosphatase 56 10/04/2018 10:57 AM    Alk. phosphatase 67 09/24/2018 06:00 PM    Alk. phosphatase 53 11/08/2016 08:45 AM    Alk.  phosphatase 59 11/01/2016 02:39 PM    Protein, total 6.8 05/17/2019 11:46 AM    Protein, total 6.5 10/04/2018 10:57 AM    Protein, total 7.6 09/24/2018 06:00 PM    Protein, total 6.5 11/08/2016 08:45 AM    Protein, total 6.2 11/01/2016 02:39 PM    Albumin 4.3 05/17/2019 11:46 AM    Albumin 4.2 10/04/2018 10:57 AM    Albumin 4.0 09/24/2018 06:00 PM    Albumin 4.2 11/08/2016 08:45 AM    Albumin 3.8 11/01/2016 02:39 PM    Globulin 3.6 09/24/2018 06:00 PM    A-G Ratio 1.7 05/17/2019 11:46 AM    A-G Ratio 1.8 10/04/2018 10:57 AM    A-G Ratio 1.1 09/24/2018 06:00 PM    A-G Ratio 1.8 11/08/2016 08:45 AM    A-G Ratio 1.6 11/01/2016 02:39 PM    ALT (SGPT) 12 05/17/2019 11:46 AM    ALT (SGPT) 17 10/04/2018 10:57 AM    ALT (SGPT) 21 09/24/2018 06:00 PM    ALT (SGPT) 17 11/08/2016 08:45 AM    ALT (SGPT) 18 11/01/2016 02:39 PM     No results found for: CPK, CPKX, CPX  Lab Results   Component Value Date/Time    Cholesterol, total 175 05/17/2019 11:46 AM    Cholesterol, total 158 11/01/2016 02:39 PM    Cholesterol, total 175 12/16/2015 01:22 PM    HDL Cholesterol 51 05/17/2019 11:46 AM    HDL Cholesterol 46 11/01/2016 02:39 PM    HDL Cholesterol 61 12/16/2015 01:22 PM    LDL, calculated 105 (H) 05/17/2019 11:46 AM    LDL, calculated 80 11/01/2016 02:39 PM    LDL, calculated 100 (H) 12/16/2015 01:22 PM    Triglyceride 93 05/17/2019 11:46 AM    Triglyceride 162 (H) 11/01/2016 02:39 PM    Triglyceride 72 12/16/2015 01:22 PM     No results found for this or any previous visit. Assessment:         Patient Active Problem List    Diagnosis Date Noted    Dry skin dermatitis 08/06/2019    Neck pain 07/23/2019    CKD (chronic kidney disease) stage 3, GFR 30-59 ml/min ([address] 06/18/2019    Vascular dementia without behavioral disturbance ([facility] 05/19/2019    Dizziness 01/31/2019    Acute cystitis without hematuria 10/24/2018    Bradycardia 10/24/2018    Age-related osteoporosis without current pathological fracture 07/25/2018    COPD (chronic obstructive pulmonary disease) ([facility]     HTN (hypertension)     B12 deficiency 01/15/2014    MCI (mild cognitive impairment) 09/17/2013    White matter disease 09/17/2013     80 y.o. female is here for cardiac evaluation--bradycardia.  dizzy more frequently, and has had a fall about once a week for the past few weeks. In total she is had about 2 or 3 falls since she was last here. Seen by Dr. Paresh Witt 9/25/19--EKG with sinus kenzie HR 49, labs checked. Previously on amlodipine and valsartan--stopped. BP now elevated. Daughter states that many of these falls seem to happen outside and the patient has stated that she feels dizziness prior to each of these. For instance, the daughter states that her mother (the patient) went outside to pull weeds and feels like her mother may have been overheated, as when her mother was coming back and she fell to the floor and hit her head in the doorway. No nausea, vomiting, loss of consciousness, syncope, tunnel vision, diaphoresis, shortness of breath or difficulty breathing, chest pain or pressure or tightness.    Also daughter feels that her mother's memory may not be as good as normal.  She has been on Aricept for some time, and at one point time had been on 10 mg was having frequent headaches with this so this was decreased to 5 mg.        Plan:     Holter monitor x 48 hrs--kenzie, dizziness, pre-syncope/falling  Echo/doppler--dizziness/near syncope, hypertension, murmur  HCTZ 12.5mg every day  F/u after testing to discuss    Steve Dunn MD

## 2019-11-07 ENCOUNTER — CLINICAL SUPPORT (OUTPATIENT)
Dept: CARDIOLOGY CLINIC | Age: 84
End: 2019-11-07

## 2019-11-07 ENCOUNTER — DOCUMENTATION ONLY (OUTPATIENT)
Dept: CARDIOLOGY CLINIC | Age: 84
End: 2019-11-07

## 2019-11-07 DIAGNOSIS — R00.1 BRADYCARDIA: ICD-10-CM

## 2019-11-07 DIAGNOSIS — R42 DIZZINESS: ICD-10-CM

## 2019-11-07 NOTE — PROGRESS NOTES
48 hour Holter monitor only. Verified patient with two patient identifiers. Pt verbalized understanding of its use. Ordering SOL Zuniga   Reason: Bradycardia [R00.1 (ICD-10-CM)]; Dizziness [R42 (ICD-10-CM)]  Start time: 10:24AM  Return date: 11/11/19        No LOS.

## 2019-11-07 NOTE — PROGRESS NOTES
Echocardiogram results Per Dr Butch Adams:    · Left Ventricle: Normal cavity size, wall thickness and systolic function (ejection fraction normal). Estimated left ventricular ejection fraction is 61 - 65%. Visually measured ejection fraction. No regional wall motion abnormality noted. Mild (grade 1) left ventricular diastolic dysfunction. · Aortic Valve: Aortic valve sclerosis. · Mitral Valve: Mild mitral valve regurgitation is present. · Tricuspid Valve: Mild tricuspid valve regurgitation is present. · Pulmonary Artery: Mild pulmonary hypertension. Spoke with the patient. Verified patient with two patient identifiers. Results given and questions answered. Patient verbalized understanding.

## 2019-11-11 ENCOUNTER — DOCUMENTATION ONLY (OUTPATIENT)
Dept: CARDIOLOGY CLINIC | Age: 84
End: 2019-11-11

## 2019-11-13 ENCOUNTER — TELEPHONE (OUTPATIENT)
Dept: CARDIOLOGY CLINIC | Age: 84
End: 2019-11-13

## 2019-11-13 NOTE — TELEPHONE ENCOUNTER
----- Message from Aldo Wallace MD sent at 11/13/2019 11:15 AM EST -----  Regarding: Holter  advse Holter shows some bradycardia, HR high 40's, episodes of PAT vs PAF--return to discuss further. Thanks [facility]    Spoke with the patients daughter. Verified patient with two patient identifiers. Results given and questions answered. Patients daughter verbalized understanding.     APPT:  Friday, November 15, 2019 10:20 AM

## 2019-11-15 ENCOUNTER — OFFICE VISIT (OUTPATIENT)
Dept: CARDIOLOGY CLINIC | Age: 84
End: 2019-11-15

## 2019-11-15 VITALS
RESPIRATION RATE: 18 BRPM | BODY MASS INDEX: 20.72 KG/M2 | HEIGHT: 67 IN | OXYGEN SATURATION: 99 % | DIASTOLIC BLOOD PRESSURE: 86 MMHG | HEART RATE: 56 BPM | SYSTOLIC BLOOD PRESSURE: 170 MMHG | WEIGHT: 132 LBS

## 2019-11-15 DIAGNOSIS — J44.9 CHRONIC OBSTRUCTIVE PULMONARY DISEASE, UNSPECIFIED COPD TYPE (HCC): ICD-10-CM

## 2019-11-15 DIAGNOSIS — N18.30 CKD (CHRONIC KIDNEY DISEASE) STAGE 3, GFR 30-59 ML/MIN (HCC): ICD-10-CM

## 2019-11-15 DIAGNOSIS — R00.1 BRADYCARDIA: Primary | ICD-10-CM

## 2019-11-15 DIAGNOSIS — I48.0 PAF (PAROXYSMAL ATRIAL FIBRILLATION) (HCC): ICD-10-CM

## 2019-11-15 DIAGNOSIS — F01.50 VASCULAR DEMENTIA WITHOUT BEHAVIORAL DISTURBANCE (HCC): ICD-10-CM

## 2019-11-15 DIAGNOSIS — I49.5 SICK SINUS SYNDROME (HCC): ICD-10-CM

## 2019-11-15 DIAGNOSIS — I10 ESSENTIAL HYPERTENSION: ICD-10-CM

## 2019-11-15 DIAGNOSIS — R90.82 WHITE MATTER DISEASE: ICD-10-CM

## 2019-11-15 RX ORDER — HYDROCHLOROTHIAZIDE 12.5 MG/1
12.5 TABLET ORAL DAILY
Qty: 90 TAB | Refills: 2 | Status: SHIPPED | OUTPATIENT
Start: 2019-11-15 | End: 2020-07-08 | Stop reason: SDUPTHER

## 2019-11-15 RX ORDER — ASPIRIN 81 MG/1
81 TABLET ORAL DAILY
Qty: 90 TAB | Refills: 2
Start: 2019-11-15 | End: 2022-06-27 | Stop reason: ALTCHOICE

## 2019-11-15 NOTE — PROGRESS NOTES
1. Have you been to the ER, urgent care clinic since your last visit? Hospitalized since your last visit? No    2. Have you seen or consulted any other health care providers outside of the [address] [place] [address] since your last visit? Include any pap smears or colon screening.  Yes Dr. Marko Grimes

## 2019-11-15 NOTE — PROGRESS NOTES
Corwin Garcia is a 80 y.o. female is here for routine f/u to discuss Holter results--NSR with kenzie to high 40's, brief episodes of PAT vs PAF. Echo 10/28/19 with Normal LVEF 60-65, mild MR/TR, mild pulm hypertension --bradycardia. dizzy more frequently, and has had a fall about once a week for the past few weeks.  In total she is had about 2 or 3 falls since she was last here. Seen by Dr. Oneil Pedro 9/25/19--EKG with sinus kenzie HR 49, labs checked. Previously on amlodipine and valsartan--stopped. BP now elevated.  Daughter states that many of these falls seem to happen outside and the patient has stated that she feels dizziness prior to each of these.  For instance, the daughter states that her mother (the patient) went outside to pull weeds and feels like her mother may have been overheated, as when her mother was coming back and she fell to the floor and hit her head in the doorway.  No nausea, vomiting, loss of consciousness, syncope, tunnel vision, diaphoresis, shortness of breath or difficulty breathing, chest pain or pressure or tightness.  Also daughter feels that her mother's memory may not be as good as normal.  She has been on Aricept for some time, and at one point time had been on 10 mg was having frequent headaches with this so this was decreased to 5 mg.    The patient denies chest pain/ shortness of breath, orthopnea, PND, LE edema, palpitations, syncope, presyncope or fatigue.        Patient Active Problem List    Diagnosis Date Noted    Dry skin dermatitis 08/06/2019    Neck pain 07/23/2019    CKD (chronic kidney disease) stage 3, GFR 30-59 ml/min ([facility] 06/18/2019    Vascular dementia without behavioral disturbance ([facility] 05/19/2019    Dizziness 01/31/2019    Acute cystitis without hematuria 10/24/2018    Bradycardia 10/24/2018    Age-related osteoporosis without current pathological fracture 07/25/2018    COPD (chronic obstructive pulmonary disease) (HCC)     HTN (hypertension)  B12 deficiency 01/15/2014    MCI (mild cognitive impairment) 2013    White matter disease 2013      Huey Hickman DO  Past Medical History:   Diagnosis Date    COPD (chronic obstructive pulmonary disease) (HCC)     GERD (gastroesophageal reflux disease)     HTN (hypertension)     Hypercholesterolemia     MCI (mild cognitive impairment)       Past Surgical History:   Procedure Laterality Date    HX COLONOSCOPY      has had several    HX MARLEN AND BSO       Allergies   Allergen Reactions    Pcn [Penicillins] Shortness of Breath      Family History   Problem Relation Age of Onset    Cancer Mother     Cancer Father     Stroke Sister     Cancer Sister       Social History     Socioeconomic History    Marital status:      Spouse name: Not on file    Number of children: Not on file    Years of education: Not on file    Highest education level: Not on file   Occupational History    Not on file   Social Needs    Financial resource strain: Not on file    Food insecurity:     Worry: Not on file     Inability: Not on file    Transportation needs:     Medical: Not on file     Non-medical: Not on file   Tobacco Use    Smoking status: Former Smoker     Packs/day: 1.00     Years: 33.00     Pack years: 33.00     Types: Cigarettes     Start date: 1952     Last attempt to quit: 1985     Years since quittin.8    Smokeless tobacco: Never Used    Tobacco comment: \"Quit many years ago\"   Substance and Sexual Activity    Alcohol use: No    Drug use: No    Sexual activity: Not on file   Lifestyle    Physical activity:     Days per week: Not on file     Minutes per session: Not on file    Stress: Not on file   Relationships    Social connections:     Talks on phone: Not on file     Gets together: Not on file     Attends [employer] service: Not on file     Active member of club or organization: Not on file     Attends meetings of clubs or organizations: Not on file Relationship status: Not on file    Intimate partner violence:     Fear of current or ex partner: Not on file     Emotionally abused: Not on file     Physically abused: Not on file     Forced sexual activity: Not on file   Other Topics Concern     Service No    Blood Transfusions No    Caffeine Concern No    Occupational Exposure No    Hobby Hazards No    Sleep Concern No    Stress Concern No    Weight Concern No    Special Diet No    Back Care No    Exercise No    Bike Helmet Not Asked    Seat Belt Yes    Self-Exams Yes   Social History Narrative    Not on file      Current Outpatient Medications   Medication Sig    memantine (NAMENDA) 5 mg tablet TAKE 1 TABLET EVERY DAY    memantine (NAMENDA) 10 mg tablet Take 1 Tab by mouth daily.  hydroCHLOROthiazide (HYDRODIURIL) 12.5 mg tablet Take 1 Tab by mouth daily.  mineral oil-isopropyl myristat (HYDROCERIN) lotion Apply  to affected area as needed for Dry Skin.  Walker (ULTRA-LIGHT ROLLATOR) misc 1 Units by Does Not Apply route daily. Dx:  At risk for falls, frailty, decreased stamina    alendronate (FOSAMAX) 70 mg tablet Take 1 Tab by mouth every seven (7) days.  donepezil (ARICEPT) 5 mg tablet Take 1 Tab by mouth nightly.  sertraline (ZOLOFT) 50 mg tablet TAKE 1 TABLET EVERY DAY for nerves and memory (Patient taking differently: Take 100 mg by mouth daily. TAKE 1 TABLET by mouth  EVERY DAY for nerves and memory)    loratadine (CLARITIN) 10 mg tablet Take 1 Tab by mouth daily as needed for Allergies.  albuterol (PROVENTIL HFA, VENTOLIN HFA, PROAIR HFA) 90 mcg/actuation inhaler Take 2 Puffs by inhalation every four (4) hours as needed for Wheezing.  cyanocobalamin (VITAMIN B-12) 1,000 mcg tablet Take 1,000 mcg by mouth daily.  fluticasone (FLONASE) 50 mcg/actuation nasal spray 2 Sprays by Both Nostrils route daily. No current facility-administered medications for this visit.           Review of Symptoms:    CONST No weight change. No fever, chills, sweats    ENT No visual changes, URI sx, sore throat    CV  See HPI   RESP  No cough, or sputum, wheezing. Also see HPI   GI  No abdominal pain or change in bowel habits. No heartburn or dysphagia. No melena or rectal bleeding.   No dysuria, urgency, frequency, hematuria   MSKEL  No joint pain, swelling. No muscle pain. SKIN  No rash or lesions. NEURO  No headache, syncope, or seizure. No weakness, loss of sensation, or paresthesias. PSYCH  No low mood or depression  No anxiety. HE/LYMPH  No easy bruising, abnormal bleeding, or enlarged glands.         Physical ExamPhysical Exam:    Visit Vitals  /86 (BP 1 Location: Left arm, BP Patient Position: Sitting)   Pulse (!) 56   Resp 18   Ht 5' 7\" (1.702 m)   Wt 132 lb (59.9 kg)   SpO2 99%   BMI 20.67 kg/m²     Gen: NAD  HEENT:  PERRL, throat clear  Neck: no adenopathy, no thyromegaly, no JVD   Heart:  Regular,Nl S1S2,  no murmur, gallop or rub.   Lungs:  clear  Abdomen:   Soft, non-tender, bowel sounds are active.   Extremities:  No edema  Pulse: symmetric  Neuro: A&O times 3, No focal neuro deficits    Cardiographics    Labs:   Lab Results   Component Value Date/Time    Sodium 143 10/25/2019 11:44 AM    Sodium 144 05/17/2019 11:46 AM    Sodium 141 10/04/2018 10:57 AM    Sodium 143 09/24/2018 06:00 PM    Sodium 143 12/12/2017 02:56 PM    Potassium 4.3 10/25/2019 11:44 AM    Potassium 4.2 05/17/2019 11:46 AM    Potassium 4.1 10/04/2018 10:57 AM    Potassium 3.6 09/24/2018 06:00 PM    Potassium 4.2 12/12/2017 02:56 PM    Chloride 102 10/25/2019 11:44 AM    Chloride 108 (H) 05/17/2019 11:46 AM    Chloride 102 10/04/2018 10:57 AM    Chloride 106 09/24/2018 06:00 PM    Chloride 104 12/12/2017 02:56 PM    CO2 26 10/25/2019 11:44 AM    CO2 20 05/17/2019 11:46 AM    CO2 20 10/04/2018 10:57 AM    CO2 25 09/24/2018 06:00 PM    CO2 25 12/12/2017 02:56 PM    Anion gap 12 09/24/2018 06:00 PM    Glucose 125 (H) 10/25/2019 11:44 AM    Glucose 100 (H) 05/17/2019 11:46 AM    Glucose 46 (L) 10/04/2018 10:57 AM    Glucose 99 09/24/2018 06:00 PM    Glucose 117 (H) 12/12/2017 02:56 PM    BUN 16 10/25/2019 11:44 AM    BUN 17 05/17/2019 11:46 AM    BUN 21 10/04/2018 10:57 AM    BUN 20 09/24/2018 06:00 PM    BUN 20 12/12/2017 02:56 PM    Creatinine 1.09 (H) 10/25/2019 11:44 AM    Creatinine 1.17 (H) 05/17/2019 11:46 AM    Creatinine 0.99 10/04/2018 10:57 AM    Creatinine 1.23 (H) 09/24/2018 06:00 PM    Creatinine 0.91 12/12/2017 02:56 PM    BUN/Creatinine ratio 15 10/25/2019 11:44 AM    BUN/Creatinine ratio 15 05/17/2019 11:46 AM    BUN/Creatinine ratio 21 10/04/2018 10:57 AM    BUN/Creatinine ratio 16 09/24/2018 06:00 PM    BUN/Creatinine ratio 22 12/12/2017 02:56 PM    GFR est AA 53 (L) 10/25/2019 11:44 AM    GFR est AA 49 (L) 05/17/2019 11:46 AM    GFR est AA 60 10/04/2018 10:57 AM    GFR est AA 50 (L) 09/24/2018 06:00 PM    GFR est AA 67 12/12/2017 02:56 PM    GFR est non-AA 46 (L) 10/25/2019 11:44 AM    GFR est non-AA 42 (L) 05/17/2019 11:46 AM    GFR est non-AA 52 (L) 10/04/2018 10:57 AM    GFR est non-AA 42 (L) 09/24/2018 06:00 PM    GFR est non-AA 58 (L) 12/12/2017 02:56 PM    Calcium 9.7 10/25/2019 11:44 AM    Calcium 9.7 05/17/2019 11:46 AM    Calcium 9.3 10/04/2018 10:57 AM    Calcium 9.6 09/24/2018 06:00 PM    Calcium 9.1 12/12/2017 02:56 PM    Bilirubin, total 0.3 10/25/2019 11:44 AM    Bilirubin, total 0.2 05/17/2019 11:46 AM    Bilirubin, total 0.3 10/04/2018 10:57 AM    Bilirubin, total 0.4 09/24/2018 06:00 PM    Bilirubin, total 0.4 11/08/2016 08:45 AM    AST (SGOT) 21 10/25/2019 11:44 AM    AST (SGOT) 21 05/17/2019 11:46 AM    AST (SGOT) 23 10/04/2018 10:57 AM    AST (SGOT) 25 09/24/2018 06:00 PM    AST (SGOT) 25 11/08/2016 08:45 AM    Alk. phosphatase 64 10/25/2019 11:44 AM    Alk. phosphatase 61 05/17/2019 11:46 AM    Alk. phosphatase 56 10/04/2018 10:57 AM    Alk. phosphatase 67 09/24/2018 06:00 PM    Alk.  phosphatase 53 11/08/2016 08:45 AM    Protein, total 6.7 10/25/2019 11:44 AM    Protein, total 6.8 05/17/2019 11:46 AM    Protein, total 6.5 10/04/2018 10:57 AM    Protein, total 7.6 09/24/2018 06:00 PM    Protein, total 6.5 11/08/2016 08:45 AM    Albumin 4.1 10/25/2019 11:44 AM    Albumin 4.3 05/17/2019 11:46 AM    Albumin 4.2 10/04/2018 10:57 AM    Albumin 4.0 09/24/2018 06:00 PM    Albumin 4.2 11/08/2016 08:45 AM    Globulin 3.6 09/24/2018 06:00 PM    A-G Ratio 1.6 10/25/2019 11:44 AM    A-G Ratio 1.7 05/17/2019 11:46 AM    A-G Ratio 1.8 10/04/2018 10:57 AM    A-G Ratio 1.1 09/24/2018 06:00 PM    A-G Ratio 1.8 11/08/2016 08:45 AM    ALT (SGPT) 11 10/25/2019 11:44 AM    ALT (SGPT) 12 05/17/2019 11:46 AM    ALT (SGPT) 17 10/04/2018 10:57 AM    ALT (SGPT) 21 09/24/2018 06:00 PM    ALT (SGPT) 17 11/08/2016 08:45 AM     No results found for: CPK, CPKX, CPX  Lab Results   Component Value Date/Time    Cholesterol, total 175 05/17/2019 11:46 AM    Cholesterol, total 158 11/01/2016 02:39 PM    Cholesterol, total 175 12/16/2015 01:22 PM    HDL Cholesterol 51 05/17/2019 11:46 AM    HDL Cholesterol 46 11/01/2016 02:39 PM    HDL Cholesterol 61 12/16/2015 01:22 PM    LDL, calculated 105 (H) 05/17/2019 11:46 AM    LDL, calculated 80 11/01/2016 02:39 PM    LDL, calculated 100 (H) 12/16/2015 01:22 PM    Triglyceride 93 05/17/2019 11:46 AM    Triglyceride 162 (H) 11/01/2016 02:39 PM    Triglyceride 72 12/16/2015 01:22 PM     No results found for this or any previous visit.     Assessment:         Patient Active Problem List    Diagnosis Date Noted    Dry skin dermatitis 08/06/2019    Neck pain 07/23/2019    CKD (chronic kidney disease) stage 3, GFR 30-59 ml/min ([facility] 06/18/2019    Vascular dementia without behavioral disturbance ([facility] 05/19/2019    Dizziness 01/31/2019    Acute cystitis without hematuria 10/24/2018    Bradycardia 10/24/2018    Age-related osteoporosis without current pathological fracture 07/25/2018    COPD (chronic obstructive pulmonary disease) ([facility] [address]     HTN (hypertension)     B12 deficiency 01/15/2014    MCI (mild cognitive impairment) 09/17/2013    White matter disease 09/17/2013      Holter results--NSR with kenzie to high 40's, brief episodes of PAT vs PAF. Echo 10/28/19 with Normal LVEF 60-65, mild MR/TR, mild pulm hypertension --bradycardia. dizzy more frequently, and has had a fall about once a week for the past few weeks.  In total she is had about 2 or 3 falls since she was last here.       Plan:     No anticoag (brief PAF on monitor, multiple falls)  ASA 81  Avoid rate-slowing meds, no indication for PPM at this point  BP elevated today, labile and low at times with previous meds reduced/stopped  F/u with PCP  RTC 6 mos, sooner seema Decker MD

## 2020-10-29 ENCOUNTER — HOME HEALTH ADMISSION (OUTPATIENT)
Dept: HOME HEALTH SERVICES | Facility: HOME HEALTH | Age: 85
End: 2020-10-29
Payer: MEDICARE

## 2020-11-02 ENCOUNTER — HOME CARE VISIT (OUTPATIENT)
Dept: SCHEDULING | Facility: HOME HEALTH | Age: 85
End: 2020-11-02
Payer: MEDICARE

## 2020-11-02 PROCEDURE — 3331090002 HH PPS REVENUE DEBIT

## 2020-11-02 PROCEDURE — 400013 HH SOC

## 2020-11-02 PROCEDURE — G0299 HHS/HOSPICE OF RN EA 15 MIN: HCPCS

## 2020-11-02 PROCEDURE — 3331090001 HH PPS REVENUE CREDIT

## 2020-11-03 ENCOUNTER — HOME CARE VISIT (OUTPATIENT)
Dept: HOME HEALTH SERVICES | Facility: HOME HEALTH | Age: 85
End: 2020-11-03
Payer: MEDICARE

## 2020-11-03 PROCEDURE — 3331090002 HH PPS REVENUE DEBIT

## 2020-11-03 PROCEDURE — 3331090001 HH PPS REVENUE CREDIT

## 2020-11-04 ENCOUNTER — HOME CARE VISIT (OUTPATIENT)
Dept: SCHEDULING | Facility: HOME HEALTH | Age: 85
End: 2020-11-04
Payer: MEDICARE

## 2020-11-04 VITALS
RESPIRATION RATE: 18 BRPM | OXYGEN SATURATION: 97 % | TEMPERATURE: 97.3 F | SYSTOLIC BLOOD PRESSURE: 160 MMHG | HEART RATE: 58 BPM | DIASTOLIC BLOOD PRESSURE: 84 MMHG

## 2020-11-04 VITALS
OXYGEN SATURATION: 99 % | HEART RATE: 60 BPM | SYSTOLIC BLOOD PRESSURE: 144 MMHG | TEMPERATURE: 98.8 F | DIASTOLIC BLOOD PRESSURE: 80 MMHG | RESPIRATION RATE: 20 BRPM

## 2020-11-04 PROCEDURE — G0151 HHCP-SERV OF PT,EA 15 MIN: HCPCS

## 2020-11-04 PROCEDURE — 3331090002 HH PPS REVENUE DEBIT

## 2020-11-04 PROCEDURE — 3331090001 HH PPS REVENUE CREDIT

## 2020-11-05 ENCOUNTER — HOME CARE VISIT (OUTPATIENT)
Dept: HOME HEALTH SERVICES | Facility: HOME HEALTH | Age: 85
End: 2020-11-05
Payer: MEDICARE

## 2020-11-05 ENCOUNTER — HOME CARE VISIT (OUTPATIENT)
Dept: SCHEDULING | Facility: HOME HEALTH | Age: 85
End: 2020-11-05
Payer: MEDICARE

## 2020-11-05 VITALS
TEMPERATURE: 97.3 F | DIASTOLIC BLOOD PRESSURE: 57 MMHG | SYSTOLIC BLOOD PRESSURE: 120 MMHG | HEART RATE: 57 BPM | OXYGEN SATURATION: 98 % | RESPIRATION RATE: 20 BRPM

## 2020-11-05 PROCEDURE — 3331090001 HH PPS REVENUE CREDIT

## 2020-11-05 PROCEDURE — G0300 HHS/HOSPICE OF LPN EA 15 MIN: HCPCS

## 2020-11-05 PROCEDURE — G0157 HHC PT ASSISTANT EA 15: HCPCS

## 2020-11-05 PROCEDURE — G0156 HHCP-SVS OF AIDE,EA 15 MIN: HCPCS

## 2020-11-05 PROCEDURE — 3331090002 HH PPS REVENUE DEBIT

## 2020-11-06 ENCOUNTER — HOME CARE VISIT (OUTPATIENT)
Dept: SCHEDULING | Facility: HOME HEALTH | Age: 85
End: 2020-11-06
Payer: MEDICARE

## 2020-11-06 PROCEDURE — 3331090001 HH PPS REVENUE CREDIT

## 2020-11-06 PROCEDURE — G0152 HHCP-SERV OF OT,EA 15 MIN: HCPCS

## 2020-11-06 PROCEDURE — 3331090002 HH PPS REVENUE DEBIT

## 2020-11-07 PROCEDURE — 3331090002 HH PPS REVENUE DEBIT

## 2020-11-07 PROCEDURE — 3331090001 HH PPS REVENUE CREDIT

## 2020-11-08 PROCEDURE — 3331090001 HH PPS REVENUE CREDIT

## 2020-11-08 PROCEDURE — 3331090002 HH PPS REVENUE DEBIT

## 2020-11-09 PROCEDURE — 3331090002 HH PPS REVENUE DEBIT

## 2020-11-09 PROCEDURE — 3331090001 HH PPS REVENUE CREDIT

## 2020-11-10 ENCOUNTER — HOME CARE VISIT (OUTPATIENT)
Dept: SCHEDULING | Facility: HOME HEALTH | Age: 85
End: 2020-11-10
Payer: MEDICARE

## 2020-11-10 PROCEDURE — 3331090002 HH PPS REVENUE DEBIT

## 2020-11-10 PROCEDURE — G0156 HHCP-SVS OF AIDE,EA 15 MIN: HCPCS

## 2020-11-10 PROCEDURE — G0299 HHS/HOSPICE OF RN EA 15 MIN: HCPCS

## 2020-11-10 PROCEDURE — G0152 HHCP-SERV OF OT,EA 15 MIN: HCPCS

## 2020-11-10 PROCEDURE — 3331090001 HH PPS REVENUE CREDIT

## 2020-11-11 ENCOUNTER — HOME CARE VISIT (OUTPATIENT)
Dept: SCHEDULING | Facility: HOME HEALTH | Age: 85
End: 2020-11-11
Payer: MEDICARE

## 2020-11-11 ENCOUNTER — HOME CARE VISIT (OUTPATIENT)
Dept: HOME HEALTH SERVICES | Facility: HOME HEALTH | Age: 85
End: 2020-11-11
Payer: MEDICARE

## 2020-11-11 VITALS
SYSTOLIC BLOOD PRESSURE: 155 MMHG | OXYGEN SATURATION: 96 % | DIASTOLIC BLOOD PRESSURE: 60 MMHG | HEART RATE: 55 BPM | TEMPERATURE: 97.6 F

## 2020-11-11 PROCEDURE — G0157 HHC PT ASSISTANT EA 15: HCPCS

## 2020-11-11 PROCEDURE — 3331090001 HH PPS REVENUE CREDIT

## 2020-11-11 PROCEDURE — 3331090002 HH PPS REVENUE DEBIT

## 2020-11-11 NOTE — PROGRESS NOTES
Please provide simple cues for sit to stand transfers with patient \"scoot forward, lean forward, push up with hands\" and to sit back down \"step back, feel chair, reach back\". When walking \"keep walker close to you\". Thank you.   Brenda Mendoza, PTA

## 2020-11-11 NOTE — PROGRESS NOTES
Spoke with Elsa Padgett at [phone] concerning patient high fall risk and need for 24 hour care at this time. Daughter requesting some guidance on how to go about getting more care for patient in home. Daughter requesting to see MSW for further assistance. Thank you.   Maggy Castro, PTA

## 2020-11-12 ENCOUNTER — HOME CARE VISIT (OUTPATIENT)
Dept: SCHEDULING | Facility: HOME HEALTH | Age: 85
End: 2020-11-12
Payer: MEDICARE

## 2020-11-12 PROCEDURE — 3331090002 HH PPS REVENUE DEBIT

## 2020-11-12 PROCEDURE — 3331090001 HH PPS REVENUE CREDIT

## 2020-11-12 PROCEDURE — G0156 HHCP-SVS OF AIDE,EA 15 MIN: HCPCS

## 2020-11-13 ENCOUNTER — HOME CARE VISIT (OUTPATIENT)
Dept: SCHEDULING | Facility: HOME HEALTH | Age: 85
End: 2020-11-13
Payer: MEDICARE

## 2020-11-13 ENCOUNTER — HOME CARE VISIT (OUTPATIENT)
Dept: HOME HEALTH SERVICES | Facility: HOME HEALTH | Age: 85
End: 2020-11-13
Payer: MEDICARE

## 2020-11-13 VITALS
RESPIRATION RATE: 20 BRPM | DIASTOLIC BLOOD PRESSURE: 80 MMHG | SYSTOLIC BLOOD PRESSURE: 134 MMHG | TEMPERATURE: 98.4 F | HEART RATE: 60 BPM | OXYGEN SATURATION: 98 %

## 2020-11-13 PROCEDURE — G0152 HHCP-SERV OF OT,EA 15 MIN: HCPCS

## 2020-11-13 PROCEDURE — G0157 HHC PT ASSISTANT EA 15: HCPCS

## 2020-11-13 PROCEDURE — 3331090001 HH PPS REVENUE CREDIT

## 2020-11-13 PROCEDURE — 3331090002 HH PPS REVENUE DEBIT

## 2020-11-13 PROCEDURE — G0299 HHS/HOSPICE OF RN EA 15 MIN: HCPCS

## 2020-11-14 PROCEDURE — 3331090001 HH PPS REVENUE CREDIT

## 2020-11-14 PROCEDURE — 3331090002 HH PPS REVENUE DEBIT

## 2020-11-15 PROCEDURE — 3331090002 HH PPS REVENUE DEBIT

## 2020-11-15 PROCEDURE — 3331090001 HH PPS REVENUE CREDIT

## 2020-11-16 ENCOUNTER — HOME CARE VISIT (OUTPATIENT)
Dept: HOME HEALTH SERVICES | Facility: HOME HEALTH | Age: 85
End: 2020-11-16
Payer: MEDICARE

## 2020-11-16 VITALS
TEMPERATURE: 98.1 F | SYSTOLIC BLOOD PRESSURE: 148 MMHG | RESPIRATION RATE: 20 BRPM | DIASTOLIC BLOOD PRESSURE: 80 MMHG | HEART RATE: 62 BPM | OXYGEN SATURATION: 98 %

## 2020-11-16 PROCEDURE — G0155 HHCP-SVS OF CSW,EA 15 MIN: HCPCS

## 2020-11-16 PROCEDURE — 3331090001 HH PPS REVENUE CREDIT

## 2020-11-16 PROCEDURE — 3331090002 HH PPS REVENUE DEBIT

## 2020-11-16 NOTE — PROGRESS NOTES
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 This was reported at the time so they should have already had the results as I did send in the medication. If she is concerned about having TIAs, I would suggest going to the emergency department. Otherwise continue to monitor blood pressure to make sure brain perfusion is adequate and if she is not on aspirin place her on aspirin. The barium swallow study did not show any silent aspiration.   The next thing I can suggest is to refer them to gastroenterology

## 2020-11-16 NOTE — PROGRESS NOTES
Dr. Henderson Seek:  Mrs Melodie Smith was seen and daughter is concerned and states that pt. is still coughing after swallowing and drinking and states that they have not been given the results of her swallow study yet. Daughter is also reporting slurred speech at periods of time during the day and night and state that this started happening approx 4 weeks ago. Pt. daughter is concerned that her mother is having TIA's but it has not been addressed. Would you please advise further what to do. We do not have speech therapy services available with our agency.

## 2020-11-17 ENCOUNTER — HOME CARE VISIT (OUTPATIENT)
Dept: SCHEDULING | Facility: HOME HEALTH | Age: 85
End: 2020-11-17
Payer: MEDICARE

## 2020-11-17 ENCOUNTER — HOME CARE VISIT (OUTPATIENT)
Dept: HOME HEALTH SERVICES | Facility: HOME HEALTH | Age: 85
End: 2020-11-17
Payer: MEDICARE

## 2020-11-17 VITALS
HEART RATE: 61 BPM | SYSTOLIC BLOOD PRESSURE: 135 MMHG | TEMPERATURE: 97.9 F | DIASTOLIC BLOOD PRESSURE: 73 MMHG | OXYGEN SATURATION: 96 %

## 2020-11-17 PROCEDURE — G0300 HHS/HOSPICE OF LPN EA 15 MIN: HCPCS

## 2020-11-17 PROCEDURE — 3331090001 HH PPS REVENUE CREDIT

## 2020-11-17 PROCEDURE — 3331090002 HH PPS REVENUE DEBIT

## 2020-11-17 PROCEDURE — G0157 HHC PT ASSISTANT EA 15: HCPCS

## 2020-11-17 PROCEDURE — G0152 HHCP-SERV OF OT,EA 15 MIN: HCPCS

## 2020-11-18 VITALS
RESPIRATION RATE: 20 BRPM | DIASTOLIC BLOOD PRESSURE: 76 MMHG | TEMPERATURE: 97.8 F | HEART RATE: 60 BPM | SYSTOLIC BLOOD PRESSURE: 122 MMHG | OXYGEN SATURATION: 98 %

## 2020-11-18 PROCEDURE — 3331090002 HH PPS REVENUE DEBIT

## 2020-11-18 PROCEDURE — 3331090001 HH PPS REVENUE CREDIT

## 2020-11-19 ENCOUNTER — HOME CARE VISIT (OUTPATIENT)
Dept: SCHEDULING | Facility: HOME HEALTH | Age: 85
End: 2020-11-19
Payer: MEDICARE

## 2020-11-19 ENCOUNTER — HOME CARE VISIT (OUTPATIENT)
Dept: HOME HEALTH SERVICES | Facility: HOME HEALTH | Age: 85
End: 2020-11-19
Payer: MEDICARE

## 2020-11-19 VITALS
HEART RATE: 51 BPM | SYSTOLIC BLOOD PRESSURE: 137 MMHG | OXYGEN SATURATION: 97 % | DIASTOLIC BLOOD PRESSURE: 67 MMHG | TEMPERATURE: 97.3 F

## 2020-11-19 PROCEDURE — G0157 HHC PT ASSISTANT EA 15: HCPCS

## 2020-11-19 PROCEDURE — 3331090001 HH PPS REVENUE CREDIT

## 2020-11-19 PROCEDURE — 3331090002 HH PPS REVENUE DEBIT

## 2020-11-20 ENCOUNTER — HOME CARE VISIT (OUTPATIENT)
Dept: SCHEDULING | Facility: HOME HEALTH | Age: 85
End: 2020-11-20
Payer: MEDICARE

## 2020-11-20 PROCEDURE — 3331090002 HH PPS REVENUE DEBIT

## 2020-11-20 PROCEDURE — 3331090001 HH PPS REVENUE CREDIT

## 2020-11-20 PROCEDURE — G0152 HHCP-SERV OF OT,EA 15 MIN: HCPCS

## 2020-11-21 ENCOUNTER — HOME CARE VISIT (OUTPATIENT)
Dept: SCHEDULING | Facility: HOME HEALTH | Age: 85
End: 2020-11-21
Payer: MEDICARE

## 2020-11-21 PROCEDURE — 3331090001 HH PPS REVENUE CREDIT

## 2020-11-21 PROCEDURE — 3331090002 HH PPS REVENUE DEBIT

## 2020-11-21 PROCEDURE — G0300 HHS/HOSPICE OF LPN EA 15 MIN: HCPCS

## 2020-11-22 PROCEDURE — 3331090001 HH PPS REVENUE CREDIT

## 2020-11-22 PROCEDURE — 3331090002 HH PPS REVENUE DEBIT

## 2020-11-23 ENCOUNTER — HOME CARE VISIT (OUTPATIENT)
Dept: SCHEDULING | Facility: HOME HEALTH | Age: 85
End: 2020-11-23
Payer: MEDICARE

## 2020-11-23 ENCOUNTER — HOME CARE VISIT (OUTPATIENT)
Dept: HOME HEALTH SERVICES | Facility: HOME HEALTH | Age: 85
End: 2020-11-23
Payer: MEDICARE

## 2020-11-23 PROCEDURE — 3331090001 HH PPS REVENUE CREDIT

## 2020-11-23 PROCEDURE — G0156 HHCP-SVS OF AIDE,EA 15 MIN: HCPCS

## 2020-11-23 PROCEDURE — 3331090002 HH PPS REVENUE DEBIT

## 2020-11-23 PROCEDURE — G0152 HHCP-SERV OF OT,EA 15 MIN: HCPCS

## 2020-11-24 ENCOUNTER — HOME CARE VISIT (OUTPATIENT)
Dept: SCHEDULING | Facility: HOME HEALTH | Age: 85
End: 2020-11-24
Payer: MEDICARE

## 2020-11-24 VITALS
OXYGEN SATURATION: 98 % | RESPIRATION RATE: 18 BRPM | SYSTOLIC BLOOD PRESSURE: 128 MMHG | DIASTOLIC BLOOD PRESSURE: 60 MMHG | TEMPERATURE: 97.6 F | HEART RATE: 76 BPM

## 2020-11-24 PROCEDURE — G0152 HHCP-SERV OF OT,EA 15 MIN: HCPCS

## 2020-11-24 PROCEDURE — 3331090001 HH PPS REVENUE CREDIT

## 2020-11-24 PROCEDURE — G0299 HHS/HOSPICE OF RN EA 15 MIN: HCPCS

## 2020-11-24 PROCEDURE — 3331090002 HH PPS REVENUE DEBIT

## 2020-11-25 ENCOUNTER — HOME CARE VISIT (OUTPATIENT)
Dept: SCHEDULING | Facility: HOME HEALTH | Age: 85
End: 2020-11-25
Payer: MEDICARE

## 2020-11-25 ENCOUNTER — HOME CARE VISIT (OUTPATIENT)
Dept: HOME HEALTH SERVICES | Facility: HOME HEALTH | Age: 85
End: 2020-11-25
Payer: MEDICARE

## 2020-11-25 VITALS
SYSTOLIC BLOOD PRESSURE: 124 MMHG | HEART RATE: 76 BPM | RESPIRATION RATE: 20 BRPM | TEMPERATURE: 98.2 F | DIASTOLIC BLOOD PRESSURE: 64 MMHG | OXYGEN SATURATION: 98 %

## 2020-11-25 PROCEDURE — G0156 HHCP-SVS OF AIDE,EA 15 MIN: HCPCS

## 2020-11-25 PROCEDURE — 3331090001 HH PPS REVENUE CREDIT

## 2020-11-25 PROCEDURE — 3331090002 HH PPS REVENUE DEBIT

## 2020-11-26 PROCEDURE — 3331090001 HH PPS REVENUE CREDIT

## 2020-11-26 PROCEDURE — 3331090002 HH PPS REVENUE DEBIT

## 2020-11-27 PROCEDURE — 3331090001 HH PPS REVENUE CREDIT

## 2020-11-27 PROCEDURE — 3331090002 HH PPS REVENUE DEBIT

## 2020-11-28 PROCEDURE — 3331090002 HH PPS REVENUE DEBIT

## 2020-11-28 PROCEDURE — 3331090001 HH PPS REVENUE CREDIT

## 2020-11-29 PROCEDURE — 3331090001 HH PPS REVENUE CREDIT

## 2020-11-29 PROCEDURE — 3331090002 HH PPS REVENUE DEBIT

## 2020-11-30 PROCEDURE — 3331090001 HH PPS REVENUE CREDIT

## 2020-11-30 PROCEDURE — 3331090002 HH PPS REVENUE DEBIT

## 2020-12-01 ENCOUNTER — HOME CARE VISIT (OUTPATIENT)
Dept: SCHEDULING | Facility: HOME HEALTH | Age: 85
End: 2020-12-01
Payer: MEDICARE

## 2020-12-01 PROCEDURE — 3331090003 HH PPS REVENUE ADJ

## 2020-12-01 PROCEDURE — G0156 HHCP-SVS OF AIDE,EA 15 MIN: HCPCS

## 2020-12-01 PROCEDURE — 3331090001 HH PPS REVENUE CREDIT

## 2020-12-01 PROCEDURE — 3331090002 HH PPS REVENUE DEBIT

## 2020-12-01 PROCEDURE — G0151 HHCP-SERV OF PT,EA 15 MIN: HCPCS

## 2020-12-02 VITALS — RESPIRATION RATE: 18 BRPM | SYSTOLIC BLOOD PRESSURE: 161 MMHG | DIASTOLIC BLOOD PRESSURE: 87 MMHG | HEART RATE: 55 BPM

## 2020-12-02 PROCEDURE — 3331090001 HH PPS REVENUE CREDIT

## 2020-12-02 PROCEDURE — 3331090002 HH PPS REVENUE DEBIT

## 2020-12-03 ENCOUNTER — HOME CARE VISIT (OUTPATIENT)
Dept: SCHEDULING | Facility: HOME HEALTH | Age: 85
End: 2020-12-03
Payer: MEDICARE

## 2020-12-03 ENCOUNTER — HOME CARE VISIT (OUTPATIENT)
Dept: HOME HEALTH SERVICES | Facility: HOME HEALTH | Age: 85
End: 2020-12-03
Payer: MEDICARE

## 2020-12-03 PROCEDURE — G0156 HHCP-SVS OF AIDE,EA 15 MIN: HCPCS

## 2020-12-03 PROCEDURE — 3331090001 HH PPS REVENUE CREDIT

## 2020-12-03 PROCEDURE — 400013 HH SOC

## 2020-12-03 PROCEDURE — 3331090002 HH PPS REVENUE DEBIT

## 2020-12-03 PROCEDURE — G0157 HHC PT ASSISTANT EA 15: HCPCS

## 2020-12-03 PROCEDURE — G0152 HHCP-SERV OF OT,EA 15 MIN: HCPCS

## 2020-12-04 ENCOUNTER — HOME CARE VISIT (OUTPATIENT)
Dept: SCHEDULING | Facility: HOME HEALTH | Age: 85
End: 2020-12-04
Payer: MEDICARE

## 2020-12-04 PROCEDURE — 3331090001 HH PPS REVENUE CREDIT

## 2020-12-04 PROCEDURE — G0152 HHCP-SERV OF OT,EA 15 MIN: HCPCS

## 2020-12-04 PROCEDURE — 3331090002 HH PPS REVENUE DEBIT

## 2020-12-05 PROCEDURE — 3331090002 HH PPS REVENUE DEBIT

## 2020-12-05 PROCEDURE — 3331090001 HH PPS REVENUE CREDIT

## 2020-12-06 PROCEDURE — 3331090002 HH PPS REVENUE DEBIT

## 2020-12-06 PROCEDURE — 3331090001 HH PPS REVENUE CREDIT

## 2020-12-07 PROCEDURE — 3331090001 HH PPS REVENUE CREDIT

## 2020-12-07 PROCEDURE — 3331090002 HH PPS REVENUE DEBIT

## 2020-12-08 ENCOUNTER — HOME CARE VISIT (OUTPATIENT)
Dept: SCHEDULING | Facility: HOME HEALTH | Age: 85
End: 2020-12-08
Payer: MEDICARE

## 2020-12-08 PROCEDURE — 3331090001 HH PPS REVENUE CREDIT

## 2020-12-08 PROCEDURE — G0152 HHCP-SERV OF OT,EA 15 MIN: HCPCS

## 2020-12-08 PROCEDURE — G0151 HHCP-SERV OF PT,EA 15 MIN: HCPCS

## 2020-12-08 PROCEDURE — 3331090002 HH PPS REVENUE DEBIT

## 2020-12-09 PROCEDURE — 3331090002 HH PPS REVENUE DEBIT

## 2020-12-09 PROCEDURE — 3331090001 HH PPS REVENUE CREDIT

## 2020-12-10 PROCEDURE — 3331090001 HH PPS REVENUE CREDIT

## 2020-12-10 PROCEDURE — 3331090002 HH PPS REVENUE DEBIT

## 2020-12-11 VITALS
TEMPERATURE: 97.7 F | HEART RATE: 59 BPM | DIASTOLIC BLOOD PRESSURE: 74 MMHG | RESPIRATION RATE: 18 BRPM | OXYGEN SATURATION: 96 % | SYSTOLIC BLOOD PRESSURE: 156 MMHG

## 2021-01-27 ENCOUNTER — OFFICE VISIT (OUTPATIENT)
Dept: CARDIOLOGY CLINIC | Age: 86
End: 2021-01-27
Payer: MEDICARE

## 2021-01-27 VITALS
HEART RATE: 74 BPM | SYSTOLIC BLOOD PRESSURE: 130 MMHG | WEIGHT: 145 LBS | RESPIRATION RATE: 16 BRPM | TEMPERATURE: 96.3 F | DIASTOLIC BLOOD PRESSURE: 78 MMHG | HEIGHT: 67 IN | BODY MASS INDEX: 22.76 KG/M2 | OXYGEN SATURATION: 97 %

## 2021-01-27 DIAGNOSIS — R00.1 BRADYCARDIA: Primary | ICD-10-CM

## 2021-01-27 DIAGNOSIS — N18.30 STAGE 3 CHRONIC KIDNEY DISEASE, UNSPECIFIED WHETHER STAGE 3A OR 3B CKD (HCC): ICD-10-CM

## 2021-01-27 DIAGNOSIS — I48.0 PAF (PAROXYSMAL ATRIAL FIBRILLATION) (HCC): ICD-10-CM

## 2021-01-27 DIAGNOSIS — F01.50 VASCULAR DEMENTIA WITHOUT BEHAVIORAL DISTURBANCE (HCC): ICD-10-CM

## 2021-01-27 DIAGNOSIS — I10 ESSENTIAL HYPERTENSION: ICD-10-CM

## 2021-01-27 DIAGNOSIS — J41.0 SIMPLE CHRONIC BRONCHITIS (HCC): ICD-10-CM

## 2021-01-27 PROCEDURE — G8536 NO DOC ELDER MAL SCRN: HCPCS | Performed by: INTERNAL MEDICINE

## 2021-01-27 PROCEDURE — 93000 ELECTROCARDIOGRAM COMPLETE: CPT | Performed by: INTERNAL MEDICINE

## 2021-01-27 PROCEDURE — G8420 CALC BMI NORM PARAMETERS: HCPCS | Performed by: INTERNAL MEDICINE

## 2021-01-27 PROCEDURE — G8427 DOCREV CUR MEDS BY ELIG CLIN: HCPCS | Performed by: INTERNAL MEDICINE

## 2021-01-27 PROCEDURE — G8510 SCR DEP NEG, NO PLAN REQD: HCPCS | Performed by: INTERNAL MEDICINE

## 2021-01-27 PROCEDURE — 99214 OFFICE O/P EST MOD 30 MIN: CPT | Performed by: INTERNAL MEDICINE

## 2021-01-27 PROCEDURE — 1090F PRES/ABSN URINE INCON ASSESS: CPT | Performed by: INTERNAL MEDICINE

## 2021-01-27 PROCEDURE — 3288F FALL RISK ASSESSMENT DOCD: CPT | Performed by: INTERNAL MEDICINE

## 2021-01-27 PROCEDURE — 1100F PTFALLS ASSESS-DOCD GE2>/YR: CPT | Performed by: INTERNAL MEDICINE

## 2021-01-27 NOTE — PROGRESS NOTES
Verified patient with two patient identifiers. Medications reviewed/approved by Dr. Espinoza. Chief Complaint   Patient presents with    Hypertension     overdue follow up     1. Have you been to the ER, urgent care clinic since your last visit? Hospitalized since your last visit?no    2. Have you seen or consulted any other health care providers outside of the [address] [place] [address] since your last visit? Include any pap smears or colon screening. No    pts visitor  Temp 98.1 F.

## 2021-01-27 NOTE — PROGRESS NOTES
Jake Rosario is a 80 y.o. female is here for routine f/u, seen here previously in 11/19 with dizziness, bradycardia, some falling episodes. Hx hypertension, ASCVD, COPD, CKD, vascular dementia followed by Dr. Radha Wheeler. Echo 10/19 with LVEF 60-65, AV sclerosis, mild MR, mild TR. Holter 11/7/19 with NSR , avg 58, PAC's/PVC's, multiple runs of PAT vs PAF up to 34 beats. Due to multiple falls, not felt to be good candidate for [facility], on ASA only. Some decline, memory issues. Recent swallowing problems as noted. No current CV sx or complaints. .  The patient denies chest pain/ shortness of breath, orthopnea, PND, LE edema, palpitations, syncope.        Patient Active Problem List    Diagnosis Date Noted    Dry skin dermatitis 08/06/2019    Neck pain 07/23/2019    CKD (chronic kidney disease) stage 3, GFR 30-59 ml/min 06/18/2019    Vascular dementia without behavioral disturbance ([facility] 05/19/2019    Dizziness 01/31/2019    Acute cystitis without hematuria 10/24/2018    Bradycardia 10/24/2018    Age-related osteoporosis without current pathological fracture 07/25/2018    COPD (chronic obstructive pulmonary disease) (HCC)     HTN (hypertension)     B12 deficiency 01/15/2014    MCI (mild cognitive impairment) 09/17/2013    White matter disease 09/17/2013      Collette Colt, DO  Past Medical History:   Diagnosis Date    COPD (chronic obstructive pulmonary disease) ([facility]     GERD (gastroesophageal reflux disease)     HTN (hypertension)     Hypercholesterolemia     MCI (mild cognitive impairment)       Past Surgical History:   Procedure Laterality Date    HX COLONOSCOPY  6/14    has had several    HX MARLEN AND BSO       Allergies   Allergen Reactions    Pcn [Penicillins] Shortness of Breath      Family History   Problem Relation Age of Onset    Cancer Mother     Cancer Father     Stroke Sister     Cancer Sister       Social History     Socioeconomic History    Marital status:      Spouse name: Not on file    Number of children: Not on file    Years of education: Not on file    Highest education level: Not on file   Occupational History    Not on file   Social Needs    Financial resource strain: Not on file    Food insecurity     Worry: Not on file     Inability: Not on file    Transportation needs     Medical: Not on file     Non-medical: Not on file   Tobacco Use    Smoking status: Former Smoker     Packs/day: 1.00     Years: 33.00     Pack years: 33.00     Types: Cigarettes     Start date: 1952     Quit date: 1985     Years since quittin.0    Smokeless tobacco: Never Used    Tobacco comment: \"Quit many years ago\"   Substance and Sexual Activity    Alcohol use: No    Drug use: No    Sexual activity: Not on file   Lifestyle    Physical activity     Days per week: Not on file     Minutes per session: Not on file    Stress: Not on file   Relationships    Social connections     Talks on phone: Not on file     Gets together: Not on file     Attends [employer] service: Not on file     Active member of club or organization: Not on file     Attends meetings of clubs or organizations: Not on file     Relationship status: Not on file    Intimate partner violence     Fear of current or ex partner: Not on file     Emotionally abused: Not on file     Physically abused: Not on file     Forced sexual activity: Not on file   Other Topics Concern     Service No    Blood Transfusions No    Caffeine Concern No    Occupational Exposure No    Hobby Hazards No    Sleep Concern No    Stress Concern No    Weight Concern No    Special Diet No    Back Care No    Exercise No    Bike Helmet Not Asked    Seat Belt Yes    Self-Exams Yes   Social History Narrative    Not on file      Current Outpatient Medications   Medication Sig    sertraline (ZOLOFT) 50 mg tablet TAKE 1 TABLET EVERY DAY    memantine (NAMENDA) 10 mg tablet TAKE 1 TABLET EVERY DAY    donepeziL (ARICEPT) 5 mg tablet Take 5 mg by mouth every evening.  cholecalciferol, vitamin D3, (Vitamin D3) 50 mcg (2,000 unit) tab Take 1 Tab by mouth daily.  hydroCHLOROthiazide (HYDRODIURIL) 12.5 mg tablet Take 1 Tab by mouth daily.  aspirin delayed-release 81 mg tablet Take 1 Tab by mouth daily.  Walker (ULTRA-LIGHT ROLLATOR) misc 1 Units by Does Not Apply route daily. Dx:  At risk for falls, frailty, decreased stamina    loratadine (CLARITIN) 10 mg tablet Take 1 Tab by mouth daily as needed for Allergies.  albuterol (PROVENTIL HFA, VENTOLIN HFA, PROAIR HFA) 90 mcg/actuation inhaler Take 2 Puffs by inhalation every four (4) hours as needed for Wheezing.  cyanocobalamin (VITAMIN B-12) 1,000 mcg tablet Take 1,000 mcg by mouth daily.  fluticasone (FLONASE) 50 mcg/actuation nasal spray 2 Sprays by Both Nostrils route daily.  acetaminophen (TYLENOL) 500 mg tablet Take 500 mg by mouth every six (6) hours as needed for Pain.  mineral oil-isopropyl myristat (HYDROCERIN) lotion Apply  to affected area as needed for Dry Skin.  alendronate (FOSAMAX) 70 mg tablet Take 1 Tab by mouth every seven (7) days. No current facility-administered medications for this visit. Review of Symptoms:    CONST  No weight change. No fever, chills, sweats    ENT No visual changes, URI sx, sore throat    CV  See HPI   RESP  No cough, or sputum, wheezing. Also see HPI   GI  No abdominal pain or change in bowel habits. No heartburn or dysphagia. No melena or rectal bleeding.   No dysuria, urgency, frequency, hematuria   MSKEL  No joint pain, swelling. No muscle pain. SKIN  No rash or lesions. NEURO  No headache, syncope, or seizure. No weakness, loss of sensation, or paresthesias. PSYCH  No low mood or depression  No anxiety. HE/LYMPH  No easy bruising, abnormal bleeding, or enlarged glands.         Physical ExamPhysical Exam:    Visit Vitals  /78 (BP 1 Location: Left arm, BP Patient Position: Sitting)   Pulse 74   Temp (!) 96.3 °F (35.7 °C) (Temporal)   Resp 16   Ht 5' 7\" (1.702 m)   Wt 145 lb (65.8 kg)   SpO2 97% Comment: ra   BMI 22.71 kg/m²     Gen: NAD  HEENT:  PERRL, throat clear  Neck: no adenopathy, no thyromegaly, no JVD   Heart:  Regular,Nl S1S2,  no murmur, gallop or rub. Lungs:  clear  Abdomen:   Soft, non-tender, bowel sounds are active.    Extremities:  No edema  Pulse: symmetric  Neuro: A&O times 3, No focal neuro deficits    Cardiographics    ECG: NSR 58, PWRP/low voltage ant leads, no acute changes    Labs:   Lab Results   Component Value Date/Time    Sodium 143 10/25/2019 11:44 AM    Sodium 144 05/17/2019 11:46 AM    Sodium 141 10/04/2018 10:57 AM    Sodium 143 09/24/2018 06:00 PM    Sodium 143 12/12/2017 02:56 PM    Potassium 4.3 10/25/2019 11:44 AM    Potassium 4.2 05/17/2019 11:46 AM    Potassium 4.1 10/04/2018 10:57 AM    Potassium 3.6 09/24/2018 06:00 PM    Potassium 4.2 12/12/2017 02:56 PM    Chloride 102 10/25/2019 11:44 AM    Chloride 108 (H) 05/17/2019 11:46 AM    Chloride 102 10/04/2018 10:57 AM    Chloride 106 09/24/2018 06:00 PM    Chloride 104 12/12/2017 02:56 PM    CO2 26 10/25/2019 11:44 AM    CO2 20 05/17/2019 11:46 AM    CO2 20 10/04/2018 10:57 AM    CO2 25 09/24/2018 06:00 PM    CO2 25 12/12/2017 02:56 PM    Anion gap 12 09/24/2018 06:00 PM    Glucose 125 (H) 10/25/2019 11:44 AM    Glucose 100 (H) 05/17/2019 11:46 AM    Glucose 46 (L) 10/04/2018 10:57 AM    Glucose 99 09/24/2018 06:00 PM    Glucose 117 (H) 12/12/2017 02:56 PM    BUN 16 10/25/2019 11:44 AM    BUN 17 05/17/2019 11:46 AM    BUN 21 10/04/2018 10:57 AM    BUN 20 09/24/2018 06:00 PM    BUN 20 12/12/2017 02:56 PM    Creatinine 1.09 (H) 10/25/2019 11:44 AM    Creatinine 1.17 (H) 05/17/2019 11:46 AM    Creatinine 0.99 10/04/2018 10:57 AM    Creatinine 1.23 (H) 09/24/2018 06:00 PM    Creatinine 0.91 12/12/2017 02:56 PM    BUN/Creatinine ratio 15 10/25/2019 11:44 AM    BUN/Creatinine ratio 15 05/17/2019 11:46 AM    BUN/Creatinine ratio 21 10/04/2018 10:57 AM    BUN/Creatinine ratio 16 09/24/2018 06:00 PM    BUN/Creatinine ratio 22 12/12/2017 02:56 PM    GFR est AA 53 (L) 10/25/2019 11:44 AM    GFR est AA 49 (L) 05/17/2019 11:46 AM    GFR est AA 60 10/04/2018 10:57 AM    GFR est AA 50 (L) 09/24/2018 06:00 PM    GFR est AA 67 12/12/2017 02:56 PM    GFR est non-AA 46 (L) 10/25/2019 11:44 AM    GFR est non-AA 42 (L) 05/17/2019 11:46 AM    GFR est non-AA 52 (L) 10/04/2018 10:57 AM    GFR est non-AA 42 (L) 09/24/2018 06:00 PM    GFR est non-AA 58 (L) 12/12/2017 02:56 PM    Calcium 9.7 10/25/2019 11:44 AM    Calcium 9.7 05/17/2019 11:46 AM    Calcium 9.3 10/04/2018 10:57 AM    Calcium 9.6 09/24/2018 06:00 PM    Calcium 9.1 12/12/2017 02:56 PM    Bilirubin, total 0.3 10/25/2019 11:44 AM    Bilirubin, total 0.2 05/17/2019 11:46 AM    Bilirubin, total 0.3 10/04/2018 10:57 AM    Bilirubin, total 0.4 09/24/2018 06:00 PM    Bilirubin, total 0.4 11/08/2016 08:45 AM    Alk. phosphatase 64 10/25/2019 11:44 AM    Alk. phosphatase 61 05/17/2019 11:46 AM    Alk. phosphatase 56 10/04/2018 10:57 AM    Alk. phosphatase 67 09/24/2018 06:00 PM    Alk. phosphatase 53 11/08/2016 08:45 AM    Protein, total 6.7 10/25/2019 11:44 AM    Protein, total 6.8 05/17/2019 11:46 AM    Protein, total 6.5 10/04/2018 10:57 AM    Protein, total 7.6 09/24/2018 06:00 PM    Protein, total 6.5 11/08/2016 08:45 AM    Albumin 4.1 10/25/2019 11:44 AM    Albumin 4.3 05/17/2019 11:46 AM    Albumin 4.2 10/04/2018 10:57 AM    Albumin 4.0 09/24/2018 06:00 PM    Albumin 4.2 11/08/2016 08:45 AM    Globulin 3.6 09/24/2018 06:00 PM    A-G Ratio 1.6 10/25/2019 11:44 AM    A-G Ratio 1.7 05/17/2019 11:46 AM    A-G Ratio 1.8 10/04/2018 10:57 AM    A-G Ratio 1.1 09/24/2018 06:00 PM    A-G Ratio 1.8 11/08/2016 08:45 AM    ALT (SGPT) 11 10/25/2019 11:44 AM    ALT (SGPT) 12 05/17/2019 11:46 AM    ALT (SGPT) 17 10/04/2018 10:57 AM    ALT (SGPT) 21 09/24/2018 06:00 PM     ALT (SGPT) 17 11/08/2016 08:45 AM     No results found for: CPK, CPKX, CPX  Lab Results   Component Value Date/Time    Cholesterol, total 175 05/17/2019 11:46 AM    Cholesterol, total 158 11/01/2016 02:39 PM    Cholesterol, total 175 12/16/2015 01:22 PM    HDL Cholesterol 51 05/17/2019 11:46 AM    HDL Cholesterol 46 11/01/2016 02:39 PM    HDL Cholesterol 61 12/16/2015 01:22 PM    LDL, calculated 105 (H) 05/17/2019 11:46 AM    LDL, calculated 80 11/01/2016 02:39 PM    LDL, calculated 100 (H) 12/16/2015 01:22 PM    Triglyceride 93 05/17/2019 11:46 AM    Triglyceride 162 (H) 11/01/2016 02:39 PM    Triglyceride 72 12/16/2015 01:22 PM     No results found for this or any previous visit. Assessment:         Patient Active Problem List    Diagnosis Date Noted    Dry skin dermatitis 08/06/2019    Neck pain 07/23/2019    CKD (chronic kidney disease) stage 3, GFR 30-59 ml/min 06/18/2019    Vascular dementia without behavioral disturbance ([facility] 05/19/2019    Dizziness 01/31/2019    Acute cystitis without hematuria 10/24/2018    Bradycardia 10/24/2018    Age-related osteoporosis without current pathological fracture 07/25/2018    [facility]     HTN (hypertension)     B12 deficiency 01/15/2014    MCI (mild cognitive impairment) 09/17/2013    White matter disease 09/17/2013     80 y.o. female is here for routine f/u, seen here previously in 11/19 with dizziness, bradycardia, some falling episodes. Hx hypertension, ASCVD, COPD, CKD, vascular dementia followed by Dr. Tatum Oneil. Echo 10/19 with LVEF 60-65, AV sclerosis, mild MR, mild TR. Holter 11/7/19 with NSR , avg 58, PAC's/PVC's, multiple runs of PAT vs PAF up to 34 beats. Due to multiple falls, not felt to be good candidate for [facility], on ASA only. Some decline, memory issues. Recent swallowing problems as noted. No current CV sx or complaints.      Plan:     Overall stable from CV standpoint  SIck sinus/PAF/[place]--no indication for PPM and not good candidate for [address] (ASA only)    Lipids and labs followed by PCP. RTC 6 mos, sooner prn   Continue current care and f/u in 6 months.     Nasima Turner MD

## 2021-03-06 ENCOUNTER — APPOINTMENT (OUTPATIENT)
Dept: GENERAL RADIOLOGY | Age: 86
DRG: 481 | End: 2021-03-06
Attending: INTERNAL MEDICINE
Payer: MEDICARE

## 2021-03-06 ENCOUNTER — HOSPITAL ENCOUNTER (INPATIENT)
Age: 86
LOS: 6 days | Discharge: NURSING, REHAB OR LONG-TERM CARE | DRG: 481 | End: 2021-03-12
Attending: HOSPITALIST | Admitting: INTERNAL MEDICINE
Payer: MEDICARE

## 2021-03-06 DIAGNOSIS — S72.001A CLOSED FRACTURE OF RIGHT HIP, INITIAL ENCOUNTER (HCC): Primary | ICD-10-CM

## 2021-03-06 PROBLEM — S72.009A HIP FRACTURE (HCC): Status: ACTIVE | Noted: 2021-03-06

## 2021-03-06 LAB
APPEARANCE UR: ABNORMAL
BACTERIA URNS QL MICRO: ABNORMAL /HPF
BILIRUB UR QL: NEGATIVE
COLOR UR: ABNORMAL
EPITH CASTS URNS QL MICRO: ABNORMAL /LPF
GLUCOSE UR STRIP.AUTO-MCNC: NEGATIVE MG/DL
GRAN CASTS URNS QL MICRO: ABNORMAL /LPF
HGB UR QL STRIP: NEGATIVE
HYALINE CASTS URNS QL MICRO: ABNORMAL /LPF (ref 0–5)
KETONES UR QL STRIP.AUTO: NEGATIVE MG/DL
LEUKOCYTE ESTERASE UR QL STRIP.AUTO: ABNORMAL
MUCOUS THREADS URNS QL MICRO: ABNORMAL /LPF
NITRITE UR QL STRIP.AUTO: POSITIVE
PH UR STRIP: 6.5 [PH] (ref 5–8)
PROT UR STRIP-MCNC: NEGATIVE MG/DL
RBC #/AREA URNS HPF: ABNORMAL /HPF (ref 0–5)
SP GR UR REFRACTOMETRY: 1.02 (ref 1–1.03)
UA: UC IF INDICATED,UAUC: ABNORMAL
UROBILINOGEN UR QL STRIP.AUTO: 1 EU/DL (ref 0.2–1)
WBC URNS QL MICRO: ABNORMAL /HPF (ref 0–4)

## 2021-03-06 PROCEDURE — 87186 SC STD MICRODIL/AGAR DIL: CPT

## 2021-03-06 PROCEDURE — 81001 URINALYSIS AUTO W/SCOPE: CPT

## 2021-03-06 PROCEDURE — 86901 BLOOD TYPING SEROLOGIC RH(D): CPT

## 2021-03-06 PROCEDURE — 87086 URINE CULTURE/COLONY COUNT: CPT

## 2021-03-06 PROCEDURE — 65270000029 HC RM PRIVATE

## 2021-03-06 PROCEDURE — 36415 COLL VENOUS BLD VENIPUNCTURE: CPT

## 2021-03-06 PROCEDURE — 87077 CULTURE AEROBIC IDENTIFY: CPT

## 2021-03-06 PROCEDURE — 74011250636 HC RX REV CODE- 250/636: Performed by: INTERNAL MEDICINE

## 2021-03-06 PROCEDURE — 74011250637 HC RX REV CODE- 250/637: Performed by: PHYSICIAN ASSISTANT

## 2021-03-06 PROCEDURE — 74011250637 HC RX REV CODE- 250/637: Performed by: INTERNAL MEDICINE

## 2021-03-06 PROCEDURE — 93005 ELECTROCARDIOGRAM TRACING: CPT

## 2021-03-06 PROCEDURE — 71045 X-RAY EXAM CHEST 1 VIEW: CPT

## 2021-03-06 RX ORDER — DONEPEZIL HYDROCHLORIDE 5 MG/1
5 TABLET, FILM COATED ORAL EVERY EVENING
Status: DISCONTINUED | OUTPATIENT
Start: 2021-03-06 | End: 2021-03-12 | Stop reason: HOSPADM

## 2021-03-06 RX ORDER — BALSAM PERU/CASTOR OIL
OINTMENT (GRAM) TOPICAL 2 TIMES DAILY
Status: DISCONTINUED | OUTPATIENT
Start: 2021-03-06 | End: 2021-03-12 | Stop reason: HOSPADM

## 2021-03-06 RX ORDER — IPRATROPIUM BROMIDE AND ALBUTEROL SULFATE 2.5; .5 MG/3ML; MG/3ML
3 SOLUTION RESPIRATORY (INHALATION)
Status: DISCONTINUED | OUTPATIENT
Start: 2021-03-06 | End: 2021-03-12 | Stop reason: HOSPADM

## 2021-03-06 RX ORDER — MEMANTINE HYDROCHLORIDE 10 MG/1
10 TABLET ORAL DAILY
Status: DISCONTINUED | OUTPATIENT
Start: 2021-03-07 | End: 2021-03-12 | Stop reason: HOSPADM

## 2021-03-06 RX ORDER — ASPIRIN 81 MG/1
81 TABLET ORAL DAILY
Status: DISCONTINUED | OUTPATIENT
Start: 2021-03-07 | End: 2021-03-12 | Stop reason: HOSPADM

## 2021-03-06 RX ORDER — MELATONIN
2000 DAILY
Status: DISCONTINUED | OUTPATIENT
Start: 2021-03-07 | End: 2021-03-12 | Stop reason: HOSPADM

## 2021-03-06 RX ORDER — SERTRALINE HYDROCHLORIDE 50 MG/1
100 TABLET, FILM COATED ORAL DAILY
Status: DISCONTINUED | OUTPATIENT
Start: 2021-03-07 | End: 2021-03-12 | Stop reason: HOSPADM

## 2021-03-06 RX ORDER — NALOXONE HYDROCHLORIDE 0.4 MG/ML
0.4 INJECTION, SOLUTION INTRAMUSCULAR; INTRAVENOUS; SUBCUTANEOUS AS NEEDED
Status: DISCONTINUED | OUTPATIENT
Start: 2021-03-06 | End: 2021-03-07 | Stop reason: SDUPTHER

## 2021-03-06 RX ORDER — SODIUM CHLORIDE 0.9 % (FLUSH) 0.9 %
5-40 SYRINGE (ML) INJECTION EVERY 8 HOURS
Status: DISCONTINUED | OUTPATIENT
Start: 2021-03-06 | End: 2021-03-07 | Stop reason: SDUPTHER

## 2021-03-06 RX ORDER — HYDROCHLOROTHIAZIDE 25 MG/1
12.5 TABLET ORAL DAILY
Status: DISCONTINUED | OUTPATIENT
Start: 2021-03-07 | End: 2021-03-09

## 2021-03-06 RX ORDER — LANOLIN ALCOHOL/MO/W.PET/CERES
1000 CREAM (GRAM) TOPICAL DAILY
Status: DISCONTINUED | OUTPATIENT
Start: 2021-03-07 | End: 2021-03-06 | Stop reason: SDUPTHER

## 2021-03-06 RX ORDER — ACETAMINOPHEN 325 MG/1
650 TABLET ORAL EVERY 6 HOURS
Status: DISCONTINUED | OUTPATIENT
Start: 2021-03-06 | End: 2021-03-07 | Stop reason: SDUPTHER

## 2021-03-06 RX ORDER — ONDANSETRON 2 MG/ML
4 INJECTION INTRAMUSCULAR; INTRAVENOUS
Status: DISCONTINUED | OUTPATIENT
Start: 2021-03-06 | End: 2021-03-07 | Stop reason: SDUPTHER

## 2021-03-06 RX ORDER — SODIUM CHLORIDE 9 MG/ML
75 INJECTION, SOLUTION INTRAVENOUS CONTINUOUS
Status: DISCONTINUED | OUTPATIENT
Start: 2021-03-06 | End: 2021-03-08

## 2021-03-06 RX ORDER — AMOXICILLIN 250 MG
2 CAPSULE ORAL 2 TIMES DAILY
Status: DISCONTINUED | OUTPATIENT
Start: 2021-03-06 | End: 2021-03-07 | Stop reason: SDUPTHER

## 2021-03-06 RX ORDER — SODIUM CHLORIDE 0.9 % (FLUSH) 0.9 %
5-40 SYRINGE (ML) INJECTION AS NEEDED
Status: DISCONTINUED | OUTPATIENT
Start: 2021-03-06 | End: 2021-03-07 | Stop reason: SDUPTHER

## 2021-03-06 RX ORDER — MORPHINE SULFATE 2 MG/ML
1-2 INJECTION, SOLUTION INTRAMUSCULAR; INTRAVENOUS
Status: DISCONTINUED | OUTPATIENT
Start: 2021-03-06 | End: 2021-03-07 | Stop reason: SDUPTHER

## 2021-03-06 RX ORDER — ACETAMINOPHEN 500 MG
500 TABLET ORAL
Status: DISCONTINUED | OUTPATIENT
Start: 2021-03-06 | End: 2021-03-09 | Stop reason: SDUPTHER

## 2021-03-06 RX ADMIN — DOCUSATE SODIUM 50MG AND SENNOSIDES 8.6MG 2 TABLET: 8.6; 5 TABLET, FILM COATED ORAL at 18:50

## 2021-03-06 RX ADMIN — CASTOR OIL AND BALSAM, PERU: 788; 87 OINTMENT TOPICAL at 22:14

## 2021-03-06 RX ADMIN — Medication 10 ML: at 22:15

## 2021-03-06 RX ADMIN — SODIUM CHLORIDE 75 ML/HR: 9 INJECTION, SOLUTION INTRAVENOUS at 18:51

## 2021-03-06 RX ADMIN — DONEPEZIL HYDROCHLORIDE 5 MG: 5 TABLET, FILM COATED ORAL at 18:50

## 2021-03-06 RX ADMIN — ACETAMINOPHEN 650 MG: 325 TABLET ORAL at 18:50

## 2021-03-06 NOTE — CONSULTS
ORTHOPAEDIC CONSULT NOTE    Subjective:     Date of Consultation:  2021    Transfer from [facility] is a 80 y.o. female who is being seen for left hip fracture. Pt's daughter reports GLF Friday night. Ambulates at baseline unassisted-- occasionally with rollator. Was set to start some PT due to recent fall and left knee pain. Has 24-7 live-in care.   Daughter does provide some in home care every other weekend    Patient Active Problem List    Diagnosis Date Noted    Dry skin dermatitis 2019    Neck pain 2019    CKD (chronic kidney disease) stage 3, GFR 30-59 ml/min 2019    Vascular dementia without behavioral disturbance ([facility] 2019    Dizziness 2019    Acute cystitis without hematuria 10/24/2018    Bradycardia 10/24/2018    Age-related osteoporosis without current pathological fracture 2018    COPD (chronic obstructive pulmonary disease) (HCC)     HTN (hypertension)     B12 deficiency 01/15/2014    MCI (mild cognitive impairment) 2013    White matter disease 2013     Family History   Problem Relation Age of Onset    Cancer Mother     Cancer Father     Stroke Sister     Cancer Sister       Social History     Tobacco Use    Smoking status: Former Smoker     Packs/day: 1.00     Years: 33.00     Pack years: 33.00     Types: Cigarettes     Start date: 1952     Quit date: 1985     Years since quittin.2    Smokeless tobacco: Never Used    Tobacco comment: \"Quit many years ago\"   Substance Use Topics    Alcohol use: No     Past Medical History:   Diagnosis Date    COPD (chronic obstructive pulmonary disease) [facility]     GERD (gastroesophageal reflux disease)     HTN (hypertension)     Hypercholesterolemia     MCI (mild cognitive impairment)       Past Surgical History:   Procedure Laterality Date    HX COLONOSCOPY      has had several    HX MARLEN AND BSO        Prior to Admission medications    Medication Sig Start Date End Date Taking? Authorizing Provider   sertraline (ZOLOFT) 100 mg tablet Take 1 Tab by mouth daily. 2/17/21   Akhil Michaels DO   memantine (NAMENDA) 10 mg tablet TAKE 1 TABLET EVERY DAY 12/18/20   Shannon Michaels DO   donepeziL (ARICEPT) 5 mg tablet Take 5 mg by mouth every evening. 11/2/20   Provider, Historical   acetaminophen (TYLENOL) 500 mg tablet Take 500 mg by mouth every six (6) hours as needed for Pain. 11/2/20   Provider, Historical   cholecalciferol, vitamin D3, (Vitamin D3) 50 mcg (2,000 unit) tab Take 1 Tab by mouth daily. 11/2/20   Provider, Historical   hydroCHLOROthiazide (HYDRODIURIL) 12.5 mg tablet Take 1 Tab by mouth daily. 7/8/20   Jenni Shane, DO   aspirin delayed-release 81 mg tablet Take 1 Tab by mouth daily. 11/15/19   Madalyn Mitchell MD   mineral oil-isopropyl myristat (HYDROCERIN) lotion Apply  to affected area as needed for Dry Skin. 8/6/19   Akhil Michaels DO   Walker (ULTRA-LIGHT ROLLATOR) misc 1 Units by Does Not Apply route daily. Dx:  At risk for falls, frailty, decreased stamina 7/23/19   Shannon Michaels DO   alendronate (FOSAMAX) 70 mg tablet Take 1 Tab by mouth every seven (7) days. 3/1/19   Akhil Michaels DO   loratadine (CLARITIN) 10 mg tablet Take 1 Tab by mouth daily as needed for Allergies. 10/25/18   Shannon Michaels DO   albuterol (PROVENTIL HFA, VENTOLIN HFA, PROAIR HFA) 90 mcg/actuation inhaler Take 2 Puffs by inhalation every four (4) hours as needed for Wheezing. 10/25/18   Jenni Cuff, DO   cyanocobalamin (VITAMIN B-12) 1,000 mcg tablet Take 1,000 mcg by mouth daily. Provider, Historical   fluticasone (FLONASE) 50 mcg/actuation nasal spray 2 Sprays by Both Nostrils route daily. 11/1/16   Grisel Hoyos MD     No current facility-administered medications for this encounter. Current Outpatient Medications   Medication Sig    sertraline (ZOLOFT) 100 mg tablet Take 1 Tab by mouth daily.     memantine (NAMENDA) 10 mg tablet TAKE 1 TABLET EVERY DAY    donepeziL (ARICEPT) 5 mg tablet Take 5 mg by mouth every evening.  acetaminophen (TYLENOL) 500 mg tablet Take 500 mg by mouth every six (6) hours as needed for Pain.  cholecalciferol, vitamin D3, (Vitamin D3) 50 mcg (2,000 unit) tab Take 1 Tab by mouth daily.  hydroCHLOROthiazide (HYDRODIURIL) 12.5 mg tablet Take 1 Tab by mouth daily.  aspirin delayed-release 81 mg tablet Take 1 Tab by mouth daily.  mineral oil-isopropyl myristat (HYDROCERIN) lotion Apply  to affected area as needed for Dry Skin.  Walker (ULTRA-LIGHT ROLLATOR) misc 1 Units by Does Not Apply route daily. Dx:  At risk for falls, frailty, decreased stamina    alendronate (FOSAMAX) 70 mg tablet Take 1 Tab by mouth every seven (7) days.  loratadine (CLARITIN) 10 mg tablet Take 1 Tab by mouth daily as needed for Allergies.  albuterol (PROVENTIL HFA, VENTOLIN HFA, PROAIR HFA) 90 mcg/actuation inhaler Take 2 Puffs by inhalation every four (4) hours as needed for Wheezing.  cyanocobalamin (VITAMIN B-12) 1,000 mcg tablet Take 1,000 mcg by mouth daily.  fluticasone (FLONASE) 50 mcg/actuation nasal spray 2 Sprays by Both Nostrils route daily. Allergies   Allergen Reactions    Pcn [Penicillins] Shortness of Breath        Review of Systems:  A comprehensive review of systems was negative except for that written in the HPI. Mental Status: mild dementia    Objective:     No data found. Temp (24hrs), Av.8 °F (37.1 °C), Min:98.8 °F (37.1 °C), Max:98.8 °F (37.1 °C)      Gen: Well-developed,  in no acute distress   HEENT: Pink conjunctivae, hearing intact to voice, moist mucous membranes   Neck: Supple  Resp: No respiratory distress   Card: RRR, palpable distal pulse-equal bilaterally, birsk cap refill all distal digits   Abd:  non-distended  Musc: LLE shortened and rotated. No sign of LE VTE. Intact active plant/dorsiflexion, EHL/FHL bilat LE. Skin: No skin breakdown noted.  Skin warm, pink, dry  Neuro: Cranial nerves are grossly intact, no focal motor weakness, follows commands appropriately   Psych: fair insight, oriented to person,  alert    Imaging Review: EXAM:  XR PELV AP ONLY   INDICATION:   Trauma   COMPARISON: None.   FINDINGS: An AP view of the pelvis demonstrates an acute intertrochanteric  fracture of the left femur with comminution and varus angulation. There is no  dislocation evident. No other fractures identified. There is no lytic or blastic  lesion or other abnormality. The soft tissues are normal.   IMPRESSION  Acute varus angulated comminuted intertrochanteric fracture of the left femur. Labs:   Recent Results (from the past 24 hour(s))   CBC WITH AUTOMATED DIFF    Collection Time: 03/06/21  2:04 PM   Result Value Ref Range    WBC 11.2 (H) 3.6 - 11.0 K/uL    RBC 3.84 3.80 - 5.20 M/uL    HGB 12.1 11.5 - 16.0 g/dL    HCT 35.4 35.0 - 47.0 %    MCV 92.2 80.0 - 99.0 FL    MCH 31.5 26.0 - 34.0 PG    MCHC 34.2 30.0 - 36.5 g/dL    RDW 12.6 11.5 - 14.5 %    PLATELET 347 054 - 847 K/uL    MPV 9.8 8.9 - 12.9 FL    NRBC 0.0 0  WBC    ABSOLUTE NRBC 0.00 0.00 - 0.01 K/uL    NEUTROPHILS 78 (H) 32 - 75 %    LYMPHOCYTES 12 12 - 49 %    MONOCYTES 8 5 - 13 %    EOSINOPHILS 0 0 - 7 %    BASOPHILS 1 0 - 1 %    IMMATURE GRANULOCYTES 1 (H) 0.0 - 0.5 %    ABS. NEUTROPHILS 8.8 (H) 1.8 - 8.0 K/UL    ABS. LYMPHOCYTES 1.3 0.8 - 3.5 K/UL    ABS. MONOCYTES 0.9 0.0 - 1.0 K/UL    ABS. EOSINOPHILS 0.0 0.0 - 0.4 K/UL    ABS. BASOPHILS 0.1 0.0 - 0.1 K/UL    ABS. IMM.  GRANS. 0.1 (H) 0.00 - 0.04 K/UL    DF AUTOMATED     METABOLIC PANEL, COMPREHENSIVE    Collection Time: 03/06/21  2:04 PM   Result Value Ref Range    Sodium 143 136 - 145 mmol/L    Potassium 3.7 3.5 - 5.1 mmol/L    Chloride 105 97 - 108 mmol/L    CO2 25 21 - 32 mmol/L    Anion gap 13 5 - 15 mmol/L    Glucose 183 (H) 65 - 100 mg/dL    BUN 20 6 - 20 MG/DL    Creatinine 1.46 (H) 0.55 - 1.02 MG/DL    BUN/Creatinine ratio 14 12 - 20      GFR est AA 41 (L) >60 ml/min/1.73m2    GFR est non-AA 34 (L) >60 ml/min/1.73m2    Calcium 8.9 8.5 - 10.1 MG/DL    Bilirubin, total 0.8 0.2 - 1.0 MG/DL    ALT (SGPT) 26 12 - 78 U/L    AST (SGOT) 35 15 - 37 U/L    Alk. phosphatase 60 45 - 117 U/L    Protein, total 6.7 6.4 - 8.2 g/dL    Albumin 3.4 (L) 3.5 - 5.0 g/dL    Globulin 3.3 2.0 - 4.0 g/dL    A-G Ratio 1.0 (L) 1.1 - 2.2     PROTHROMBIN TIME + INR    Collection Time: 03/06/21  2:04 PM   Result Value Ref Range    INR 1.1 0.9 - 1.1      Prothrombin time 11.3 (H) 9.0 - 11.1 sec   PTT    Collection Time: 03/06/21  2:04 PM   Result Value Ref Range    aPTT 20.5 (L) 22.1 - 31.0 sec    aPTT, therapeutic range     58.0 - 77.0 SECS         Impression:     Patient Active Problem List    Diagnosis Date Noted    Dry skin dermatitis 08/06/2019    Neck pain 07/23/2019    CKD (chronic kidney disease) stage 3, GFR 30-59 ml/min 06/18/2019    [facility]    Dizziness 01/31/2019    Acute cystitis without hematuria 10/24/2018    Bradycardia 10/24/2018    Age-related osteoporosis without current pathological fracture 07/25/2018    COPD (chronic obstructive pulmonary disease) (HCC)     HTN (hypertension)     B12 deficiency 01/15/2014    MCI (mild cognitive impairment) 09/17/2013    White matter disease 09/17/2013     Active Problems:    * No active hospital problems. *      Plan:     -  Plan for troch nail Sunday  -  Bed rest  -  Grace-  DC POD1  -  Fall risk -  Daughter concerned about pt getting out bed- bed alarm set  -  Medicine for Pre-Operative Clearence/ Post-Operative management.     -  DVT Prophylaxis - SCDs  -  D/C planning TBD      Dr. Kenisha Bajwa  aware and agrees with plan as above.         John Freeman PA-C  [employer]

## 2021-03-06 NOTE — H&P
Hospitalist Admission Note    NAME: Melida Treadwell   :  1932   MRN:  [id number]     Date/Time:  3/6/2021 6:51 PM    Patient PCP: Karlos Molina, DO  ______________________________________________________________________  Given the patient's current clinical presentation, I have a high level of concern for decompensation if discharged from the emergency department. Complex decision making was performed, which includes reviewing the patient's available past medical records, laboratory results, and x-ray films. My assessment of this patient's clinical condition and my plan of care is as follows. Assessment / Plan:  Preop clearance  Hip fracture  Pre-op cardiac risk assessment:  Pt evaluated using revised cardiac risk index and is felt to be moderate cardiovascular risk for intermediate risk surgery with a1-2.4 risk for major complications based on these criteria. Plan for surgery without further cardiac testing if this risk is acceptable per surgery and anesthesia. Further risk reduction will involve medical management of other comorbid conditions in the perioperative period. Check an EKG and a chest x-ray  As needed morphine for pain  Continue vitamin D    Acute kidney injury  Continue with IV fluid  Hypertension  Resume home dose hydrochlorothiazide    Depression  Dementia  Continue with Zoloft  Continue with Aricept, Namenda  Code Status: Presumed full code unable to reach daughter  Surrogate Decision Maker:  Ariana Cano [phone]     Carly Capps Child [phone]         DVT Prophylaxis: SCDs for now  GI Prophylaxis: not indicated    Baseline: Use a walker      Subjective:   CHIEF COMPLAINT: fall     HISTORY OF PRESENT ILLNESS:     Champ Weaver is a 80 y.o.  female with past medical history of COPD, hypertension, hyperlipidemia, Alzheimer who transferred from [facility] for management of  left hip fracture.   Most of the HPI obtained from the transfer papers, as unable to reach patient daughter with whom she lives and patient is currently confused. Patient reportedly fell yesterday evening after she attempted to go up a stair . Patient has fallen three times within a week, has had some ambulatory dysfunction. Vital signs  on admission are stable, labs revealed mildly elevated white blood cell count, elevated creatinine  X-ray of the left hip showed Acute varus angulated comminuted intertrochanteric fracture of the  left femur. We were asked to admit for work up and evaluation of the above problems. Past Medical History:   Diagnosis Date    COPD (chronic obstructive pulmonary disease) (HCC)     GERD (gastroesophageal reflux disease)     HTN (hypertension)     Hypercholesterolemia     MCI (mild cognitive impairment)         Past Surgical History:   Procedure Laterality Date    HX COLONOSCOPY      has had several    HX MARLEN AND BSO         Social History     Tobacco Use    Smoking status: Former Smoker     Packs/day: 1.00     Years: 33.00     Pack years: 33.00     Types: Cigarettes     Start date: 1952     Quit date: 1985     Years since quittin.2    Smokeless tobacco: Never Used    Tobacco comment: \"Quit many years ago\"   Substance Use Topics    Alcohol use: No        Family History   Problem Relation Age of Onset    Cancer Mother     Cancer Father     Stroke Sister     Cancer Sister      Allergies   Allergen Reactions    Pcn [Penicillins] Shortness of Breath        Prior to Admission medications    Medication Sig Start Date End Date Taking? Authorizing Provider   sertraline (ZOLOFT) 100 mg tablet Take 1 Tab by mouth daily. 21   Katrina Michaels,    memantine (NAMENDA) 10 mg tablet TAKE 1 TABLET EVERY DAY 20   Shannon Michaels DO   donepeziL (ARICEPT) 5 mg tablet Take 5 mg by mouth every evening.  20   Provider, Historical   acetaminophen (TYLENOL) 500 mg tablet Take 500 mg by mouth every six (6) hours as needed for Pain. 11/2/20   Provider, Historical   cholecalciferol, vitamin D3, (Vitamin D3) 50 mcg (2,000 unit) tab Take 1 Tab by mouth daily. 11/2/20   Provider, Historical   hydroCHLOROthiazide (HYDRODIURIL) 12.5 mg tablet Take 1 Tab by mouth daily. 7/8/20   Jenni Cuff, DO   aspirin delayed-release 81 mg tablet Take 1 Tab by mouth daily. 11/15/19   Madalyn Mitchell MD   mineral oil-isopropyl myristat (HYDROCERIN) lotion Apply  to affected area as needed for Dry Skin. 8/6/19   Akhil Michaels, DO   Walker (ULTRA-LIGHT ROLLATOR) misc 1 Units by Does Not Apply route daily. Dx:  At risk for falls, frailty, decreased stamina 7/23/19   Shannon Michaels DO   alendronate (FOSAMAX) 70 mg tablet Take 1 Tab by mouth every seven (7) days. 3/1/19   Akhil Michaels, DO   loratadine (CLARITIN) 10 mg tablet Take 1 Tab by mouth daily as needed for Allergies. 10/25/18   Shannon Michaels DO   albuterol (PROVENTIL HFA, VENTOLIN HFA, PROAIR HFA) 90 mcg/actuation inhaler Take 2 Puffs by inhalation every four (4) hours as needed for Wheezing. 10/25/18   Jenni Cuff, DO   cyanocobalamin (VITAMIN B-12) 1,000 mcg tablet Take 1,000 mcg by mouth daily. Provider, Historical   fluticasone (FLONASE) 50 mcg/actuation nasal spray 2 Sprays by Both Nostrils route daily. 11/1/16   Grisel Hoyos MD       REVIEW OF SYSTEMS:     I am not able to complete the review of systems because:    The patient is intubated and sedated    The patient has altered mental status due to his acute medical problems    The patient has baseline aphasia from prior stroke(s)   x The patient has baseline dementia and is not reliable historian    The patient is in acute medical distress and unable to provide information             Objective:   VITALS:    Visit Vitals  BP (!) 140/73   Pulse 65   Temp 98 °F (36.7 °C)   Resp 18   SpO2 95%       PHYSICAL EXAM:    General:    Alert, cooperative, no distress, appears stated age.     HEENT: Atraumatic, anicteric sclerae, pink conjunctivae     No oral ulcers, mucosa moist, throat clear, dentition fair  Neck:  Supple, symmetrical,  thyroid: non tender  Lungs:   Clear to auscultation bilaterally. No Wheezing or Rhonchi. No rales. Chest wall:  No tenderness  No Accessory muscle use. Heart:   Regular  rhythm,  No  murmur   No edema  Abdomen:   Soft, non-tender. Not distended. Bowel sounds normal  Extremities: No cyanosis. No clubbing,      Skin turgor normal, Capillary refill normal, Radial dial pulse 2+  Skin:     Not pale. Not Jaundiced  No rashes   Psych:  Poor  insight. Not depressed. Not anxious or agitated. Neurologic: EOMs intact. No facial asymmetry. No aphasia or slurred speech. Symmetrical strength, Sensation grossly intact. Alert and oriented X 2.     _______________________________________________________________________  Care Plan discussed with:    Comments   Patient x    Family      RN x    Care Manager                    Consultant:      _______________________________________________________________________  Expected  Disposition:   Home with Family    HH/PT/OT/RN    SNF/LTC    FAIHT    ________________________________________________________________________  TOTAL TIME: 72 Minutes    Critical Care Provided     Minutes non procedure based      Comments    x Reviewed previous records   >50% of visit spent in counseling and coordination of care x Discussion with patient and/or family and questions answered       ________________________________________________________________________  Signed: Cleveland Torre MD    Procedures: see electronic medical records for all procedures/Xrays and details which were not copied into this note but were reviewed prior to creation of Plan.     LAB DATA REVIEWED:    Recent Results (from the past 24 hour(s))   CBC WITH AUTOMATED DIFF    Collection Time: 03/06/21  2:04 PM   Result Value Ref Range    WBC 11.2 (H) 3.6 - 11.0 K/uL    RBC 3.84 3.80 - 5.20 M/uL    HGB 12.1 11.5 - 16.0 g/dL    HCT 35.4 35.0 - 47.0 %    MCV 92.2 80.0 - 99.0 FL    MCH 31.5 26.0 - 34.0 PG    MCHC 34.2 30.0 - 36.5 g/dL    RDW 12.6 11.5 - 14.5 %    PLATELET 508 354 - 840 K/uL    MPV 9.8 8.9 - 12.9 FL    NRBC 0.0 0  WBC    ABSOLUTE NRBC 0.00 0.00 - 0.01 K/uL    NEUTROPHILS 78 (H) 32 - 75 %    LYMPHOCYTES 12 12 - 49 %    MONOCYTES 8 5 - 13 %    EOSINOPHILS 0 0 - 7 %    BASOPHILS 1 0 - 1 %    IMMATURE GRANULOCYTES 1 (H) 0.0 - 0.5 %    ABS. NEUTROPHILS 8.8 (H) 1.8 - 8.0 K/UL    ABS. LYMPHOCYTES 1.3 0.8 - 3.5 K/UL    ABS. MONOCYTES 0.9 0.0 - 1.0 K/UL    ABS. EOSINOPHILS 0.0 0.0 - 0.4 K/UL    ABS. BASOPHILS 0.1 0.0 - 0.1 K/UL    ABS. IMM. GRANS. 0.1 (H) 0.00 - 0.04 K/UL    DF AUTOMATED     METABOLIC PANEL, COMPREHENSIVE    Collection Time: 03/06/21  2:04 PM   Result Value Ref Range    Sodium 143 136 - 145 mmol/L    Potassium 3.7 3.5 - 5.1 mmol/L    Chloride 105 97 - 108 mmol/L    CO2 25 21 - 32 mmol/L    Anion gap 13 5 - 15 mmol/L    Glucose 183 (H) 65 - 100 mg/dL    BUN 20 6 - 20 MG/DL    Creatinine 1.46 (H) 0.55 - 1.02 MG/DL    BUN/Creatinine ratio 14 12 - 20      GFR est AA 41 (L) >60 ml/min/1.73m2    GFR est non-AA 34 (L) >60 ml/min/1.73m2    Calcium 8.9 8.5 - 10.1 MG/DL    Bilirubin, total 0.8 0.2 - 1.0 MG/DL    ALT (SGPT) 26 12 - 78 U/L    AST (SGOT) 35 15 - 37 U/L    Alk.  phosphatase 60 45 - 117 U/L    Protein, total 6.7 6.4 - 8.2 g/dL    Albumin 3.4 (L) 3.5 - 5.0 g/dL    Globulin 3.3 2.0 - 4.0 g/dL    A-G Ratio 1.0 (L) 1.1 - 2.2     PROTHROMBIN TIME + INR    Collection Time: 03/06/21  2:04 PM   Result Value Ref Range    INR 1.1 0.9 - 1.1      Prothrombin time 11.3 (H) 9.0 - 11.1 sec   PTT    Collection Time: 03/06/21  2:04 PM   Result Value Ref Range    aPTT 20.5 (L) 22.1 - 31.0 sec    aPTT, therapeutic range     58.0 - 77.0 SECS

## 2021-03-07 ENCOUNTER — APPOINTMENT (OUTPATIENT)
Dept: GENERAL RADIOLOGY | Age: 86
DRG: 481 | End: 2021-03-07
Attending: ORTHOPAEDIC SURGERY
Payer: MEDICARE

## 2021-03-07 ENCOUNTER — ANESTHESIA (OUTPATIENT)
Dept: SURGERY | Age: 86
DRG: 481 | End: 2021-03-07
Payer: MEDICARE

## 2021-03-07 ENCOUNTER — ANESTHESIA EVENT (OUTPATIENT)
Dept: SURGERY | Age: 86
DRG: 481 | End: 2021-03-07
Payer: MEDICARE

## 2021-03-07 LAB
ABO + RH BLD: NORMAL
ANION GAP SERPL CALC-SCNC: 6 MMOL/L (ref 5–15)
ATRIAL RATE: 66 BPM
BASOPHILS # BLD: 0 K/UL (ref 0–0.1)
BASOPHILS NFR BLD: 1 % (ref 0–1)
BLOOD GROUP ANTIBODIES SERPL: NORMAL
BUN SERPL-MCNC: 22 MG/DL (ref 6–20)
BUN/CREAT SERPL: 23 (ref 12–20)
CALCIUM SERPL-MCNC: 8.2 MG/DL (ref 8.5–10.1)
CALCULATED P AXIS, ECG09: 30 DEGREES
CALCULATED R AXIS, ECG10: 24 DEGREES
CALCULATED T AXIS, ECG11: 95 DEGREES
CHLORIDE SERPL-SCNC: 110 MMOL/L (ref 97–108)
CO2 SERPL-SCNC: 26 MMOL/L (ref 21–32)
CREAT SERPL-MCNC: 0.94 MG/DL (ref 0.55–1.02)
DIAGNOSIS, 93000: NORMAL
DIFFERENTIAL METHOD BLD: ABNORMAL
EOSINOPHIL # BLD: 0 K/UL (ref 0–0.4)
EOSINOPHIL NFR BLD: 1 % (ref 0–7)
ERYTHROCYTE [DISTWIDTH] IN BLOOD BY AUTOMATED COUNT: 12.8 % (ref 11.5–14.5)
GLUCOSE SERPL-MCNC: 110 MG/DL (ref 65–100)
HCT VFR BLD AUTO: 29 % (ref 35–47)
HGB BLD-MCNC: 9.8 G/DL (ref 11.5–16)
IMM GRANULOCYTES # BLD AUTO: 0 K/UL (ref 0–0.04)
IMM GRANULOCYTES NFR BLD AUTO: 1 % (ref 0–0.5)
LYMPHOCYTES # BLD: 2.5 K/UL (ref 0.8–3.5)
LYMPHOCYTES NFR BLD: 29 % (ref 12–49)
MCH RBC QN AUTO: 31.7 PG (ref 26–34)
MCHC RBC AUTO-ENTMCNC: 33.8 G/DL (ref 30–36.5)
MCV RBC AUTO: 93.9 FL (ref 80–99)
MONOCYTES # BLD: 0.9 K/UL (ref 0–1)
MONOCYTES NFR BLD: 10 % (ref 5–13)
NEUTS SEG # BLD: 5.2 K/UL (ref 1.8–8)
NEUTS SEG NFR BLD: 58 % (ref 32–75)
NRBC # BLD: 0 K/UL (ref 0–0.01)
NRBC BLD-RTO: 0 PER 100 WBC
P-R INTERVAL, ECG05: 120 MS
PLATELET # BLD AUTO: 172 K/UL (ref 150–400)
PMV BLD AUTO: 9.6 FL (ref 8.9–12.9)
POTASSIUM SERPL-SCNC: 3.3 MMOL/L (ref 3.5–5.1)
Q-T INTERVAL, ECG07: 444 MS
QRS DURATION, ECG06: 76 MS
QTC CALCULATION (BEZET), ECG08: 461 MS
RBC # BLD AUTO: 3.09 M/UL (ref 3.8–5.2)
SODIUM SERPL-SCNC: 142 MMOL/L (ref 136–145)
SPECIMEN EXP DATE BLD: NORMAL
VENTRICULAR RATE, ECG03: 65 BPM
WBC # BLD AUTO: 8.7 K/UL (ref 3.6–11)

## 2021-03-07 PROCEDURE — 74011250636 HC RX REV CODE- 250/636: Performed by: PHYSICIAN ASSISTANT

## 2021-03-07 PROCEDURE — 77030040361 HC SLV COMPR DVT MDII -B

## 2021-03-07 PROCEDURE — 77030036660

## 2021-03-07 PROCEDURE — 77010033678 HC OXYGEN DAILY

## 2021-03-07 PROCEDURE — 74011250636 HC RX REV CODE- 250/636: Performed by: NURSE ANESTHETIST, CERTIFIED REGISTERED

## 2021-03-07 PROCEDURE — C1713 ANCHOR/SCREW BN/BN,TIS/BN: HCPCS | Performed by: ORTHOPAEDIC SURGERY

## 2021-03-07 PROCEDURE — 85025 COMPLETE CBC W/AUTO DIFF WBC: CPT

## 2021-03-07 PROCEDURE — 0QS736Z REPOSITION LEFT UPPER FEMUR WITH INTRAMEDULLARY INTERNAL FIXATION DEVICE, PERCUTANEOUS APPROACH: ICD-10-PCS | Performed by: ORTHOPAEDIC SURGERY

## 2021-03-07 PROCEDURE — 76010000138 HC OR TIME 0.5 TO 1 HR: Performed by: ORTHOPAEDIC SURGERY

## 2021-03-07 PROCEDURE — 76060000032 HC ANESTHESIA 0.5 TO 1 HR: Performed by: ORTHOPAEDIC SURGERY

## 2021-03-07 PROCEDURE — 74011250637 HC RX REV CODE- 250/637: Performed by: PHYSICIAN ASSISTANT

## 2021-03-07 PROCEDURE — 77030020788: Performed by: ORTHOPAEDIC SURGERY

## 2021-03-07 PROCEDURE — 76210000006 HC OR PH I REC 0.5 TO 1 HR: Performed by: ORTHOPAEDIC SURGERY

## 2021-03-07 PROCEDURE — 73501 X-RAY EXAM HIP UNI 1 VIEW: CPT

## 2021-03-07 PROCEDURE — 94760 N-INVAS EAR/PLS OXIMETRY 1: CPT

## 2021-03-07 PROCEDURE — 77030040922 HC BLNKT HYPOTHRM STRY -A

## 2021-03-07 PROCEDURE — 77030016474 HC BIT DRL QC3 SYNT -C: Performed by: ORTHOPAEDIC SURGERY

## 2021-03-07 PROCEDURE — 77030031139 HC SUT VCRL2 J&J -A: Performed by: ORTHOPAEDIC SURGERY

## 2021-03-07 PROCEDURE — 74011250637 HC RX REV CODE- 250/637: Performed by: ORTHOPAEDIC SURGERY

## 2021-03-07 PROCEDURE — 76000 FLUOROSCOPY <1 HR PHYS/QHP: CPT

## 2021-03-07 PROCEDURE — 74011250636 HC RX REV CODE- 250/636: Performed by: STUDENT IN AN ORGANIZED HEALTH CARE EDUCATION/TRAINING PROGRAM

## 2021-03-07 PROCEDURE — 77030008684 HC TU ET CUF COVD -B: Performed by: ANESTHESIOLOGY

## 2021-03-07 PROCEDURE — 74011250636 HC RX REV CODE- 250/636: Performed by: INTERNAL MEDICINE

## 2021-03-07 PROCEDURE — 74011000250 HC RX REV CODE- 250: Performed by: NURSE ANESTHETIST, CERTIFIED REGISTERED

## 2021-03-07 PROCEDURE — 74011250636 HC RX REV CODE- 250/636: Performed by: ORTHOPAEDIC SURGERY

## 2021-03-07 PROCEDURE — 36415 COLL VENOUS BLD VENIPUNCTURE: CPT

## 2021-03-07 PROCEDURE — 77030008462 HC STPLR SKN PROX J&J -A: Performed by: ORTHOPAEDIC SURGERY

## 2021-03-07 PROCEDURE — C1769 GUIDE WIRE: HCPCS | Performed by: ORTHOPAEDIC SURGERY

## 2021-03-07 PROCEDURE — 2709999900 HC NON-CHARGEABLE SUPPLY

## 2021-03-07 PROCEDURE — 77030026438 HC STYL ET INTUB CARD -A: Performed by: ANESTHESIOLOGY

## 2021-03-07 PROCEDURE — 65660000000 HC RM CCU STEPDOWN

## 2021-03-07 PROCEDURE — 2709999900 HC NON-CHARGEABLE SUPPLY: Performed by: ORTHOPAEDIC SURGERY

## 2021-03-07 PROCEDURE — 80048 BASIC METABOLIC PNL TOTAL CA: CPT

## 2021-03-07 DEVICE — SCREW BNE L34MM DIA5MM NONSTERILE TIB LT GRN TI ST CANN LOK: Type: IMPLANTABLE DEVICE | Site: HIP | Status: FUNCTIONAL

## 2021-03-07 DEVICE — NAIL IM L235MM DIA11MM 130DEG SHT L PROX FEM GRN TI CANN: Type: IMPLANTABLE DEVICE | Site: HIP | Status: FUNCTIONAL

## 2021-03-07 DEVICE — IMPLANTABLE DEVICE: Type: IMPLANTABLE DEVICE | Site: HIP | Status: FUNCTIONAL

## 2021-03-07 RX ORDER — POLYETHYLENE GLYCOL 3350 17 G/17G
17 POWDER, FOR SOLUTION ORAL DAILY
Status: DISCONTINUED | OUTPATIENT
Start: 2021-03-08 | End: 2021-03-12 | Stop reason: HOSPADM

## 2021-03-07 RX ORDER — LIDOCAINE HYDROCHLORIDE 20 MG/ML
INJECTION, SOLUTION EPIDURAL; INFILTRATION; INTRACAUDAL; PERINEURAL AS NEEDED
Status: DISCONTINUED | OUTPATIENT
Start: 2021-03-07 | End: 2021-03-07 | Stop reason: HOSPADM

## 2021-03-07 RX ORDER — DEXMEDETOMIDINE HYDROCHLORIDE 100 UG/ML
INJECTION, SOLUTION INTRAVENOUS AS NEEDED
Status: DISCONTINUED | OUTPATIENT
Start: 2021-03-07 | End: 2021-03-07 | Stop reason: HOSPADM

## 2021-03-07 RX ORDER — PROPOFOL 10 MG/ML
INJECTION, EMULSION INTRAVENOUS AS NEEDED
Status: DISCONTINUED | OUTPATIENT
Start: 2021-03-07 | End: 2021-03-07 | Stop reason: HOSPADM

## 2021-03-07 RX ORDER — SUCCINYLCHOLINE CHLORIDE 20 MG/ML
INJECTION INTRAMUSCULAR; INTRAVENOUS AS NEEDED
Status: DISCONTINUED | OUTPATIENT
Start: 2021-03-07 | End: 2021-03-07 | Stop reason: HOSPADM

## 2021-03-07 RX ORDER — SODIUM CHLORIDE 0.9 % (FLUSH) 0.9 %
5-40 SYRINGE (ML) INJECTION EVERY 8 HOURS
Status: DISCONTINUED | OUTPATIENT
Start: 2021-03-07 | End: 2021-03-12 | Stop reason: HOSPADM

## 2021-03-07 RX ORDER — LEVOFLOXACIN 5 MG/ML
250 INJECTION, SOLUTION INTRAVENOUS EVERY 24 HOURS
Status: COMPLETED | OUTPATIENT
Start: 2021-03-07 | End: 2021-03-09

## 2021-03-07 RX ORDER — OXYCODONE HYDROCHLORIDE 5 MG/1
5 TABLET ORAL
Status: DISCONTINUED | OUTPATIENT
Start: 2021-03-07 | End: 2021-03-09

## 2021-03-07 RX ORDER — ENOXAPARIN SODIUM 100 MG/ML
40 INJECTION SUBCUTANEOUS DAILY
Status: DISCONTINUED | OUTPATIENT
Start: 2021-03-08 | End: 2021-03-09

## 2021-03-07 RX ORDER — OXYCODONE HYDROCHLORIDE 5 MG/1
2.5 TABLET ORAL
Status: DISCONTINUED | OUTPATIENT
Start: 2021-03-07 | End: 2021-03-09

## 2021-03-07 RX ORDER — FERROUS SULFATE, DRIED 160(50) MG
1 TABLET, EXTENDED RELEASE ORAL
Status: DISCONTINUED | OUTPATIENT
Start: 2021-03-08 | End: 2021-03-07 | Stop reason: SDUPTHER

## 2021-03-07 RX ORDER — MORPHINE SULFATE 2 MG/ML
1 INJECTION, SOLUTION INTRAMUSCULAR; INTRAVENOUS
Status: ACTIVE | OUTPATIENT
Start: 2021-03-07 | End: 2021-03-08

## 2021-03-07 RX ORDER — ETOMIDATE 2 MG/ML
INJECTION INTRAVENOUS AS NEEDED
Status: DISCONTINUED | OUTPATIENT
Start: 2021-03-07 | End: 2021-03-07 | Stop reason: HOSPADM

## 2021-03-07 RX ORDER — NALOXONE HYDROCHLORIDE 0.4 MG/ML
0.4 INJECTION, SOLUTION INTRAMUSCULAR; INTRAVENOUS; SUBCUTANEOUS AS NEEDED
Status: DISCONTINUED | OUTPATIENT
Start: 2021-03-07 | End: 2021-03-12 | Stop reason: HOSPADM

## 2021-03-07 RX ORDER — ROCURONIUM BROMIDE 10 MG/ML
INJECTION, SOLUTION INTRAVENOUS AS NEEDED
Status: DISCONTINUED | OUTPATIENT
Start: 2021-03-07 | End: 2021-03-07 | Stop reason: HOSPADM

## 2021-03-07 RX ORDER — FACIAL-BODY WIPES
10 EACH TOPICAL DAILY PRN
Status: DISCONTINUED | OUTPATIENT
Start: 2021-03-09 | End: 2021-03-12 | Stop reason: HOSPADM

## 2021-03-07 RX ORDER — ACETAMINOPHEN 325 MG/1
650 TABLET ORAL EVERY 6 HOURS
Status: DISCONTINUED | OUTPATIENT
Start: 2021-03-07 | End: 2021-03-09

## 2021-03-07 RX ORDER — SODIUM CHLORIDE 9 MG/ML
125 INJECTION, SOLUTION INTRAVENOUS CONTINUOUS
Status: DISPENSED | OUTPATIENT
Start: 2021-03-07 | End: 2021-03-08

## 2021-03-07 RX ORDER — FENTANYL CITRATE 50 UG/ML
INJECTION, SOLUTION INTRAMUSCULAR; INTRAVENOUS AS NEEDED
Status: DISCONTINUED | OUTPATIENT
Start: 2021-03-07 | End: 2021-03-07 | Stop reason: HOSPADM

## 2021-03-07 RX ORDER — ONDANSETRON 2 MG/ML
4 INJECTION INTRAMUSCULAR; INTRAVENOUS
Status: ACTIVE | OUTPATIENT
Start: 2021-03-07 | End: 2021-03-08

## 2021-03-07 RX ORDER — SODIUM CHLORIDE 0.9 % (FLUSH) 0.9 %
5-40 SYRINGE (ML) INJECTION AS NEEDED
Status: DISCONTINUED | OUTPATIENT
Start: 2021-03-07 | End: 2021-03-12 | Stop reason: HOSPADM

## 2021-03-07 RX ORDER — AMOXICILLIN 250 MG
1 CAPSULE ORAL 2 TIMES DAILY
Status: DISCONTINUED | OUTPATIENT
Start: 2021-03-07 | End: 2021-03-12 | Stop reason: HOSPADM

## 2021-03-07 RX ORDER — SODIUM CHLORIDE, SODIUM LACTATE, POTASSIUM CHLORIDE, CALCIUM CHLORIDE 600; 310; 30; 20 MG/100ML; MG/100ML; MG/100ML; MG/100ML
INJECTION, SOLUTION INTRAVENOUS
Status: DISCONTINUED | OUTPATIENT
Start: 2021-03-07 | End: 2021-03-07 | Stop reason: HOSPADM

## 2021-03-07 RX ORDER — ONDANSETRON 4 MG/1
4 TABLET, ORALLY DISINTEGRATING ORAL
Status: DISCONTINUED | OUTPATIENT
Start: 2021-03-07 | End: 2021-03-12 | Stop reason: HOSPADM

## 2021-03-07 RX ADMIN — LEVOFLOXACIN 250 MG: 5 INJECTION, SOLUTION INTRAVENOUS at 14:32

## 2021-03-07 RX ADMIN — FENTANYL CITRATE 50 MCG: 50 INJECTION, SOLUTION INTRAMUSCULAR; INTRAVENOUS at 15:02

## 2021-03-07 RX ADMIN — SODIUM CHLORIDE 125 ML/HR: 9 INJECTION, SOLUTION INTRAVENOUS at 21:02

## 2021-03-07 RX ADMIN — SUCCINYLCHOLINE CHLORIDE 120 MG: 20 INJECTION, SOLUTION INTRAMUSCULAR; INTRAVENOUS at 14:43

## 2021-03-07 RX ADMIN — ROCURONIUM BROMIDE 10 MG: 10 INJECTION INTRAVENOUS at 14:43

## 2021-03-07 RX ADMIN — SODIUM CHLORIDE 125 ML/HR: 9 INJECTION, SOLUTION INTRAVENOUS at 17:19

## 2021-03-07 RX ADMIN — Medication 10 ML: at 21:02

## 2021-03-07 RX ADMIN — FENTANYL CITRATE 50 MCG: 50 INJECTION, SOLUTION INTRAMUSCULAR; INTRAVENOUS at 14:43

## 2021-03-07 RX ADMIN — Medication 3 AMPULE: at 14:31

## 2021-03-07 RX ADMIN — CASTOR OIL AND BALSAM, PERU: 788; 87 OINTMENT TOPICAL at 09:02

## 2021-03-07 RX ADMIN — ACETAMINOPHEN 650 MG: 325 TABLET ORAL at 23:16

## 2021-03-07 RX ADMIN — Medication 1 AMPULE: at 20:01

## 2021-03-07 RX ADMIN — SODIUM CHLORIDE 75 ML/HR: 9 INJECTION, SOLUTION INTRAVENOUS at 04:11

## 2021-03-07 RX ADMIN — CASTOR OIL AND BALSAM, PERU: 788; 87 OINTMENT TOPICAL at 18:00

## 2021-03-07 RX ADMIN — LIDOCAINE HYDROCHLORIDE 40 MG: 20 INJECTION, SOLUTION EPIDURAL; INFILTRATION; INTRACAUDAL; PERINEURAL at 14:43

## 2021-03-07 RX ADMIN — PROPOFOL 30 MG: 10 INJECTION, EMULSION INTRAVENOUS at 14:50

## 2021-03-07 RX ADMIN — SODIUM CHLORIDE, POTASSIUM CHLORIDE, SODIUM LACTATE AND CALCIUM CHLORIDE: 600; 310; 30; 20 INJECTION, SOLUTION INTRAVENOUS at 14:23

## 2021-03-07 RX ADMIN — SODIUM CHLORIDE 60 MCG/MIN: 900 INJECTION, SOLUTION INTRAVENOUS at 14:49

## 2021-03-07 RX ADMIN — VANCOMYCIN HYDROCHLORIDE 1 G: 1 INJECTION, POWDER, LYOPHILIZED, FOR SOLUTION INTRAVENOUS at 14:51

## 2021-03-07 RX ADMIN — ETOMIDATE 10 MG: 2 INJECTION, SOLUTION INTRAVENOUS at 14:43

## 2021-03-07 RX ADMIN — PROPOFOL 50 MG: 10 INJECTION, EMULSION INTRAVENOUS at 14:43

## 2021-03-07 RX ADMIN — DEXMEDETOMIDINE HYDROCHLORIDE 6 MCG: 100 INJECTION, SOLUTION, CONCENTRATE INTRAVENOUS at 15:04

## 2021-03-07 RX ADMIN — Medication 10 ML: at 06:40

## 2021-03-07 RX ADMIN — ACETAMINOPHEN 650 MG: 325 TABLET ORAL at 06:38

## 2021-03-07 RX ADMIN — ACETAMINOPHEN 650 MG: 325 TABLET ORAL at 00:19

## 2021-03-07 NOTE — CONSULTS
JOINT  CONSULT    Subjective:     Date of Consultation:  2021    Referring Physician:  DOV Brandketan Lee is a 80 y.o.  female who is being seen for left hip pain. Workup has revealed left IT hip fx. Had a GLF yesterday. Presented as a transfer from [facility]. Notes left hip pain with ROM. Normally resides at home. Patient Active Problem List    Diagnosis Date Noted    Hip fracture ([facility] [address] 2021    Dry skin dermatitis 2019    Neck pain 2019    CKD (chronic kidney disease) stage 3, GFR 30-59 ml/min 2019    Vascular dementia without behavioral disturbance ([facility] [address] 2019    Dizziness 2019    Acute cystitis without hematuria 10/24/2018    Bradycardia 10/24/2018    Age-related osteoporosis without current pathological fracture 2018    COPD (chronic obstructive pulmonary disease) (HCC)     HTN (hypertension)     B12 deficiency 01/15/2014    MCI (mild cognitive impairment) 2013    White matter disease 2013     Family History   Problem Relation Age of Onset    Cancer Mother     Cancer Father     Stroke Sister     Cancer Sister       Social History     Tobacco Use    Smoking status: Former Smoker     Packs/day: 1.00     Years: 33.00     Pack years: 33.00     Types: Cigarettes     Start date: 1952     Quit date: 1985     Years since quittin.2    Smokeless tobacco: Never Used    Tobacco comment: \"Quit many years ago\"   Substance Use Topics    Alcohol use: No     Past Medical History:   Diagnosis Date    COPD (chronic obstructive pulmonary disease) ([facility] [address]     GERD (gastroesophageal reflux disease)     HTN (hypertension)     Hypercholesterolemia     MCI (mild cognitive impairment)       Past Surgical History:   Procedure Laterality Date    HX COLONOSCOPY      has had several    HX MARLEN AND BSO        Prior to Admission medications    Medication Sig Start Date End Date Taking?  Authorizing Provider   sertraline (ZOLOFT) 100 mg tablet Take 1 Tab by mouth daily. 2/17/21   [place] Michaels DO   memantine (NAMENDA) 10 mg tablet TAKE 1 TABLET EVERY DAY 12/18/20   Shannon Michaels DO   donepeziL (ARICEPT) 5 mg tablet Take 5 mg by mouth every evening. 11/2/20   Provider, Historical   acetaminophen (TYLENOL) 500 mg tablet Take 500 mg by mouth every six (6) hours as needed for Pain. 11/2/20   Provider, Historical   cholecalciferol, vitamin D3, (Vitamin D3) 50 mcg (2,000 unit) tab Take 1 Tab by mouth daily. 11/2/20   Provider, Historical   hydroCHLOROthiazide (HYDRODIURIL) 12.5 mg tablet Take 1 Tab by mouth daily. 7/8/20   Haylee Yates DO   aspirin delayed-release 81 mg tablet Take 1 Tab by mouth daily. 11/15/19   Prabhjot Lester MD   mineral oil-isopropyl myristat (HYDROCERIN) lotion Apply  to affected area as needed for Dry Skin. 8/6/19   [place] Michaels DO   Walker (ULTRA-LIGHT ROLLATOR) misc 1 Units by Does Not Apply route daily. Dx:  At risk for falls, frailty, decreased stamina 7/23/19   Shannon Michaels DO   alendronate (FOSAMAX) 70 mg tablet Take 1 Tab by mouth every seven (7) days. 3/1/19   [place] Michaels DO   loratadine (CLARITIN) 10 mg tablet Take 1 Tab by mouth daily as needed for Allergies. 10/25/18   Shannon Michaels DO   albuterol (PROVENTIL HFA, VENTOLIN HFA, PROAIR HFA) 90 mcg/actuation inhaler Take 2 Puffs by inhalation every four (4) hours as needed for Wheezing. 10/25/18   Haylee Yates DO   cyanocobalamin (VITAMIN B-12) 1,000 mcg tablet Take 1,000 mcg by mouth daily. Provider, Historical   fluticasone (FLONASE) 50 mcg/actuation nasal spray 2 Sprays by Both Nostrils route daily.  11/1/16   Griselda Poles, MD     Current Facility-Administered Medications   Medication Dose Route Frequency    levoFLOXacin (LEVAQUIN) 250 mg in D5W IVPB  250 mg IntraVENous Q24H    sodium chloride (NS) flush 5-40 mL  5-40 mL IntraVENous Q8H    sodium chloride (NS) flush 5-40 mL  5-40 mL IntraVENous PRN    acetaminophen (TYLENOL) tablet 650 mg  650 mg Oral Q6H    naloxone (NARCAN) injection 0.4 mg  0.4 mg IntraVENous PRN    ondansetron (ZOFRAN) injection 4 mg  4 mg IntraVENous Q4H PRN    senna-docusate (PERICOLACE) 8.6-50 mg per tablet 2 Tab  2 Tab Oral BID    morphine injection 1-2 mg  1-2 mg IntraVENous Q3H PRN    acetaminophen (TYLENOL) tablet 500 mg  500 mg Oral Q6H PRN    aspirin delayed-release tablet 81 mg  81 mg Oral DAILY    [Held by provider] donepeziL (ARICEPT) tablet 5 mg  5 mg Oral QPM    hydroCHLOROthiazide (HYDRODIURIL) tablet 12.5 mg  12.5 mg Oral DAILY    memantine (NAMENDA) tablet 10 mg  10 mg Oral DAILY    sertraline (ZOLOFT) tablet 100 mg  100 mg Oral DAILY    cholecalciferol (VITAMIN D3) (1000 Units /25 mcg) tablet 2,000 Units  2,000 Units Oral DAILY    0.9% sodium chloride infusion  75 mL/hr IntraVENous CONTINUOUS    balsam peru-castor oiL (VENELEX) ointment   Topical BID    albuterol-ipratropium (DUO-NEB) 2.5 MG-0.5 MG/3 ML  3 mL Nebulization Q6H PRN     Allergies   Allergen Reactions    Pcn [Penicillins] Shortness of Breath        Review of Systems:  A comprehensive review of systems was negative. denies f/c/n/v/cp/sob    Objective:     Patient Vitals for the past 8 hrs:   BP Temp Pulse Resp SpO2   21 1135 (!) 149/51 98.3 °F (36.8 °C) 63 18 99 %   21 0746 (!) 142/54 97.5 °F (36.4 °C) 60 18 96 %     Temp (24hrs), Av.2 °F (36.8 °C), Min:97.5 °F (36.4 °C), Max:98.8 °F (37.1 °C)      Physical Exam:  General:     Alert and oriented. No acute distress. Chest:     Respirations unlabored. Abdomen:     Extremities:   Soft, non-tender. No evidence of cyanosis. Pulses palpable in both upper and lower extremities. Sumaya's sign negative in bilateral lower extremities. Moving all extremities. Pain with AROM/PROM L hip        Neurologic:  No motor deficits. Sensation stable. Neurovascular exam within normal limits.                  Data Review   Recent Results (from the past 24 hour(s))   CBC WITH AUTOMATED DIFF    Collection Time: 03/06/21  2:04 PM   Result Value Ref Range    WBC 11.2 (H) 3.6 - 11.0 K/uL    RBC 3.84 3.80 - 5.20 M/uL    HGB 12.1 11.5 - 16.0 g/dL    HCT 35.4 35.0 - 47.0 %    MCV 92.2 80.0 - 99.0 FL    MCH 31.5 26.0 - 34.0 PG    MCHC 34.2 30.0 - 36.5 g/dL    RDW 12.6 11.5 - 14.5 %    PLATELET 368 999 - 849 K/uL    MPV 9.8 8.9 - 12.9 FL    NRBC 0.0 0  WBC    ABSOLUTE NRBC 0.00 0.00 - 0.01 K/uL    NEUTROPHILS 78 (H) 32 - 75 %    LYMPHOCYTES 12 12 - 49 %    MONOCYTES 8 5 - 13 %    EOSINOPHILS 0 0 - 7 %    BASOPHILS 1 0 - 1 %    IMMATURE GRANULOCYTES 1 (H) 0.0 - 0.5 %    ABS. NEUTROPHILS 8.8 (H) 1.8 - 8.0 K/UL    ABS. LYMPHOCYTES 1.3 0.8 - 3.5 K/UL    ABS. MONOCYTES 0.9 0.0 - 1.0 K/UL    ABS. EOSINOPHILS 0.0 0.0 - 0.4 K/UL    ABS. BASOPHILS 0.1 0.0 - 0.1 K/UL    ABS. IMM. GRANS. 0.1 (H) 0.00 - 0.04 K/UL    DF AUTOMATED     METABOLIC PANEL, COMPREHENSIVE    Collection Time: 03/06/21  2:04 PM   Result Value Ref Range    Sodium 143 136 - 145 mmol/L    Potassium 3.7 3.5 - 5.1 mmol/L    Chloride 105 97 - 108 mmol/L    CO2 25 21 - 32 mmol/L    Anion gap 13 5 - 15 mmol/L    Glucose 183 (H) 65 - 100 mg/dL    BUN 20 6 - 20 MG/DL    Creatinine 1.46 (H) 0.55 - 1.02 MG/DL    BUN/Creatinine ratio 14 12 - 20      GFR est AA 41 (L) >60 ml/min/1.73m2    GFR est non-AA 34 (L) >60 ml/min/1.73m2    Calcium 8.9 8.5 - 10.1 MG/DL    Bilirubin, total 0.8 0.2 - 1.0 MG/DL    ALT (SGPT) 26 12 - 78 U/L    AST (SGOT) 35 15 - 37 U/L    Alk.  phosphatase 60 45 - 117 U/L    Protein, total 6.7 6.4 - 8.2 g/dL    Albumin 3.4 (L) 3.5 - 5.0 g/dL    Globulin 3.3 2.0 - 4.0 g/dL    A-G Ratio 1.0 (L) 1.1 - 2.2     PROTHROMBIN TIME + INR    Collection Time: 03/06/21  2:04 PM   Result Value Ref Range    INR 1.1 0.9 - 1.1      Prothrombin time 11.3 (H) 9.0 - 11.1 sec   PTT    Collection Time: 03/06/21  2:04 PM   Result Value Ref Range    aPTT 20.5 (L) 22.1 - 31.0 sec    aPTT, therapeutic range     58.0 - 77.0 SECS   TYPE & SCREEN    Collection Time: 03/06/21  6:40 PM   Result Value Ref Range    Crossmatch Expiration 03/09/2021,2359     ABO/Rh(D) O NEGATIVE     Antibody screen NEG    URINALYSIS W/ REFLEX CULTURE    Collection Time: 03/06/21  6:40 PM    Specimen: Urine   Result Value Ref Range    Color YELLOW/STRAW      Appearance CLOUDY (A) CLEAR      Specific gravity 1.022 1.003 - 1.030      pH (UA) 6.5 5.0 - 8.0      Protein Negative NEG mg/dL    Glucose Negative NEG mg/dL    Ketone Negative NEG mg/dL    Bilirubin Negative NEG      Blood Negative NEG      Urobilinogen 1.0 0.2 - 1.0 EU/dL    Nitrites Positive (A) NEG      Leukocyte Esterase SMALL (A) NEG      WBC 10-20 0 - 4 /hpf    RBC 0-5 0 - 5 /hpf    Epithelial cells FEW FEW /lpf    Bacteria 3+ (A) NEG /hpf    UA:UC IF INDICATED URINE CULTURE ORDERED (A) CNI      Mucus TRACE (A) NEG /lpf    Hyaline cast 0-2 0 - 5 /lpf    Granular cast 0-2 (A) NEG /lpf   EKG, 12 LEAD, INITIAL    Collection Time: 03/06/21  8:25 PM   Result Value Ref Range    Ventricular Rate 65 BPM    Atrial Rate 66 BPM    P-R Interval 120 ms    QRS Duration 76 ms    Q-T Interval 444 ms    QTC Calculation (Bezet) 461 ms    Calculated P Axis 30 degrees    Calculated R Axis 24 degrees    Calculated T Axis 95 degrees    Diagnosis       Undetermined rhythm  Nonspecific ST and T wave abnormality  No previous ECGs available     CBC WITH AUTOMATED DIFF    Collection Time: 03/07/21  4:08 AM   Result Value Ref Range    WBC 8.7 3.6 - 11.0 K/uL    RBC 3.09 (L) 3.80 - 5.20 M/uL    HGB 9.8 (L) 11.5 - 16.0 g/dL    HCT 29.0 (L) 35.0 - 47.0 %    MCV 93.9 80.0 - 99.0 FL    MCH 31.7 26.0 - 34.0 PG    MCHC 33.8 30.0 - 36.5 g/dL    RDW 12.8 11.5 - 14.5 %    PLATELET 903 050 - 632 K/uL    MPV 9.6 8.9 - 12.9 FL    NRBC 0.0 0  WBC    ABSOLUTE NRBC 0.00 0.00 - 0.01 K/uL    NEUTROPHILS 58 32 - 75 %    LYMPHOCYTES 29 12 - 49 %    MONOCYTES 10 5 - 13 %    EOSINOPHILS 1 0 - 7 %    BASOPHILS 1 0 - 1 %    IMMATURE GRANULOCYTES 1 (H) 0.0 - 0.5 %    ABS. NEUTROPHILS 5.2 1.8 - 8.0 K/UL    ABS. LYMPHOCYTES 2.5 0.8 - 3.5 K/UL    ABS. MONOCYTES 0.9 0.0 - 1.0 K/UL    ABS. EOSINOPHILS 0.0 0.0 - 0.4 K/UL    ABS. BASOPHILS 0.0 0.0 - 0.1 K/UL    ABS. IMM. GRANS. 0.0 0.00 - 0.04 K/UL    DF AUTOMATED     METABOLIC PANEL, BASIC    Collection Time: 03/07/21  4:08 AM   Result Value Ref Range    Sodium 142 136 - 145 mmol/L    Potassium 3.3 (L) 3.5 - 5.1 mmol/L    Chloride 110 (H) 97 - 108 mmol/L    CO2 26 21 - 32 mmol/L    Anion gap 6 5 - 15 mmol/L    Glucose 110 (H) 65 - 100 mg/dL    BUN 22 (H) 6 - 20 MG/DL    Creatinine 0.94 0.55 - 1.02 MG/DL    BUN/Creatinine ratio 23 (H) 12 - 20      GFR est AA >60 >60 ml/min/1.73m2    GFR est non-AA 56 (L) >60 ml/min/1.73m2    Calcium 8.2 (L) 8.5 - 10.1 MG/DL     Xr Pelv Ap Only    Result Date: 3/6/2021  Acute varus angulated comminuted intertrochanteric fracture of the left femur. Xr Chest Port    Result Date: 3/6/2021  1. No acute cardiopulmonary process. 2. Emphysema. Prominent interstitial opacities, predominantly in the upper lobes, may represent concurrent fibrosis. 3. Mild cardiomegaly. Assessment/Plan:     Left IT hip fx    Pain control  Bedrest prior to OR  Discussed tx options with patient and family, planning for L hip IMN. The risks of surgery were explained. Risks of infection, blood loss, neurovascular injury, anesthesia risks, and risks secondary to patient comorbidities were explained.     Ice to hip prn  Medically optimized and cleared      Davonte Mosqueda DO

## 2021-03-07 NOTE — PERIOP NOTES
Handoff Report from Operating Room to PACU    Report received from NAEEM Sapp RN and Radha Amezcua CRNA regarding Ramez Lion. Surgeon(s):  Kwan De La Torre DO  And Procedure(s) (LRB):  LEFT FEMUR INSERTION INTRA MEDULLARY NAIL-URGENT (Left)  confirmed   with allergies and dressings discussed. Anesthesia type, drugs, patient history, complications, estimated blood loss, vital signs, intake and output, and last pain medication, lines, reversal medications and temperature were reviewed.

## 2021-03-07 NOTE — ROUTINE PROCESS
TRANSFER - OUT REPORT:    Verbal report given to Daphne Larios(name) on Lavelle Simms  being transferred to Preop(unit) for routine progression of care       Report consisted of patients Situation, Background, Assessment and   Recommendations(SBAR). Information from the following report(s) SBAR, Kardex, Intake/Output, MAR and Accordion was reviewed with the receiving nurse. Lines:   Peripheral IV 03/06/21 Right Forearm (Active)   Site Assessment Clean, dry, & intact 03/07/21 0750   Phlebitis Assessment 0 03/07/21 0750   Infiltration Assessment 0 03/07/21 0750   Dressing Status Clean, dry, & intact 03/07/21 0750   Dressing Type Tape;Transparent 03/07/21 0750   Hub Color/Line Status Pink;Capped 03/07/21 0750        Opportunity for questions and clarification was provided.

## 2021-03-07 NOTE — PROGRESS NOTES
Hospitalist Progress Note    NAME: Bren Galarza   :  1932   MRN:  [id number]       Assessment / Plan:    Comminuted intertrochanteric fracture of the left femur.  - Transferred from [facility] for management of  left hip fracture. S/p GLF.  - Ortho consulted  - As needed morphine for pain  - Continue vitamin D     Acute kidney injury  - Cr trending down  - Continue with IV fluid for now    Urinary tract infection  - UA suggestive of UTI. U Cx pending. Start Levaquin empirically. Penicillin allergy. Hypertension  - continue hydrochlorothiazide     Normocytic anemia  - monitor H and H    Hypokalemia  - Replete and montor    Depression  Dementia  - Continue with Zoloft  - Continue with Aricept, Namenda      Code Status: Presumed full code unable to reach daughter  Surrogate Decision MakerDrucimissy Georges Child [phone]       Carly Capps Child [phone]             DVT Prophylaxis: SCDs for now  GI Prophylaxis: not indicated     Baseline: Use a walker       Subjective:     Chief Complaint / Reason for Physician Visit  Discussed with RN events overnight. C/o pain at the left hip      Review of Systems:  Symptom Y/N Comments  Symptom Y/N Comments   Fever/Chills    Chest Pain     Poor Appetite    Edema     Cough    Abdominal Pain     Sputum    Joint Pain     SOB/PLUMMER    Pruritis/Rash     Nausea/vomit    Tolerating PT/OT     Diarrhea    Tolerating Diet     Constipation    Other       Could NOT obtain due to:      Objective:     VITALS:   Last 24hrs VS reviewed since prior progress note.  Most recent are:  Patient Vitals for the past 24 hrs:   Temp Pulse Resp BP SpO2   21 0746 97.5 °F (36.4 °C) 60 18 (!) 142/54 96 %   21 0324 98.6 °F (37 °C) 69 18 113/63 97 %   21 2319 98 °F (36.7 °C) (!) 55 18 133/61 99 %   21 2200 -- -- -- -- 96 %   21 1821 98 °F (36.7 °C) 65 18 (!) 140/73 95 %     No intake or output data in the 24 hours ending 21 0753     I had a face to face encounter and independently examined this patient on 3/7/2021, as outlined below:  PHYSICAL EXAM:  General: WD, WN. Alert, cooperative, no acute distress    EENT:  EOMI. Anicteric sclerae. MMM  Resp:  CTA bilaterally, no wheezing or rales. No accessory muscle use  CV:  Regular  rhythm,  No edema  GI:  Soft, Non distended, Non tender. +Bowel sounds  Neurologic:  Alert and oriented X 3, normal speech,   Psych:   Good insight. Not anxious nor agitated  Skin:  No rashes. No jaundice    Reviewed most current lab test results and cultures  YES  Reviewed most current radiology test results   YES  Review and summation of old records today    NO  Reviewed patient's current orders and MAR    YES  PMH/SH reviewed - no change compared to H&P  ________________________________________________________________________  Care Plan discussed with:    Comments   Patient x    Family      RN     Care Manager     Consultant                        Multidiciplinary team rounds were held today with , nursing, pharmacist and clinical coordinator. Patient's plan of care was discussed; medications were reviewed and discharge planning was addressed. ________________________________________________________________________      Total CRITICAL CARE TIME Spent:   Minutes non procedure based      Comments   >50% of visit spent in counseling and coordination of care x    ________________________________________________________________________  Cyril MD Elana     Procedures: see electronic medical records for all procedures/Xrays and details which were not copied into this note but were reviewed prior to creation of Plan. LABS:  I reviewed today's most current labs and imaging studies.   Pertinent labs include:  Recent Labs     03/07/21  0408 03/06/21  1404   WBC 8.7 11.2*   HGB 9.8* 12.1   HCT 29.0* 35.4    222     Recent Labs     03/07/21  0408 03/06/21  1404    143   K 3.3* 3.7   CL 110* 105   CO2 26 25   * 183*   BUN 22* 20   CREA 0.94 1.46*   CA 8.2* 8.9   ALB  --  3.4*   TBILI  --  0.8   ALT  --  26   INR  --  1.1       Signed: Hermilo Hernandez MD

## 2021-03-07 NOTE — ROUTINE PROCESS
TRANSFER - IN REPORT:    Verbal report received from Daphne Epperson(name) on Luis Enrique Hinojosa  being received from PACU(unit) for routine post - op      Report consisted of patients Situation, Background, Assessment and   Recommendations(SBAR). Information from the following report(s) SBAR, Kardex, Intake/Output, MAR and Accordion was reviewed with the receiving nurse. Opportunity for questions and clarification was provided.

## 2021-03-07 NOTE — ANESTHESIA PREPROCEDURE EVALUATION
Relevant Problems   RESPIRATORY SYSTEM   (+) COPD (chronic obstructive pulmonary disease) (HCC)      NEUROLOGY   (+) Vascular dementia without behavioral disturbance (HCC)      CARDIOVASCULAR   (+) HTN (hypertension)      RENAL FAILURE   (+) CKD (chronic kidney disease) stage 3, GFR 30-59 ml/min       Anesthetic History   No history of anesthetic complications            Review of Systems / Medical History  Patient summary reviewed, nursing notes reviewed and pertinent labs reviewed    Pulmonary    COPD               Neuro/Psych         Dementia     Cardiovascular    Hypertension              Exercise tolerance: >4 METS     GI/Hepatic/Renal     GERD           Endo/Other        Anemia     Other Findings   Comments: Left hip fx    Hgb 9.8           Physical Exam    Airway  Mallampati: II  TM Distance: 4 - 6 cm  Neck ROM: normal range of motion   Mouth opening: Normal     Cardiovascular  Regular rate and rhythm,  S1 and S2 normal,  no murmur, click, rub, or gallop             Dental  No notable dental hx       Pulmonary  Breath sounds clear to auscultation               Abdominal  GI exam deferred       Other Findings            Anesthetic Plan    ASA: 3  Anesthesia type: general    Monitoring Plan: BIS      Induction: Intravenous  Anesthetic plan and risks discussed with: Patient

## 2021-03-07 NOTE — PROGRESS NOTES
End of Shift Note    Bedside shift change report given to [address] [place] [address] Road (oncoming nurse) by David Niño RN (offgoing nurse). Report included the following information SBAR, Kardex, Intake/Output, MAR and Recent Results    Shift worked:  7190-5246     Shift summary and any significant changes:     kolb inserted utilizing sterile technique, UA obtained upon insertion, type and screen obtained per orders. CHG bath given. On admission sandhya sized non-blanchable pink spot on R buttock visualized. Foam mepilex applied, wound care consult placed, dr. Claudia Samuel notified and order given for venelex. Concerns for physician to address:  surgery     Zone phone for oncoming shift:   [phone]       Activity:     Number times ambulated in hallways past shift: 0  Number of times OOB to chair past shift: 0    Cardiac:   Cardiac Monitoring: No           Access:   Current line(s): PIV     Genitourinary:   Urinary status: kolb    Respiratory:      Chronic home O2 use?: NO  Incentive spirometer at bedside: YES     GI:     Current diet:  DIET NPO Except Meds  DIET REGULAR  Passing flatus: YES  Tolerating current diet: YES       Pain Management:   Patient states pain is manageable on current regimen: YES    Skin:  Ryne Score: 12  Interventions: float heels, increase time out of bed, foam dressing, PT/OT consult, limit briefs and internal/external urinary devices    Patient Safety:  Fall Score:  Total Score: 3  Interventions: bed/chair alarm, assistive device (walker, cane, etc), gripper socks, pt to call before getting OOB, stay with me (per policy) and gait belt  High Fall Risk: Yes    Length of Stay:  Expected LOS: - - -  Actual LOS: 0      Tere Atkinson, RN

## 2021-03-07 NOTE — ANESTHESIA POSTPROCEDURE EVALUATION
Procedure(s):  LEFT FEMUR INSERTION INTRA MEDULLARY NAIL-URGENT.    general    Anesthesia Post Evaluation        Patient location during evaluation: PACU  Note status: Adequate. Level of consciousness: responsive to verbal stimuli and sleepy but conscious  Pain management: satisfactory to patient  Airway patency: patent  Anesthetic complications: no  Cardiovascular status: acceptable  Respiratory status: acceptable  Hydration status: acceptable  Comments: +Post-Anesthesia Evaluation and Assessment    Patient: Bull Sinclair MRN: [id number]  SSN: [id number]   YOB: 1932  Age: 80 y.o. Sex: female      Cardiovascular Function/Vital Signs    BP (!) 150/58 (BP 1 Location: Right upper arm, BP Patient Position: At rest)   Pulse (!) 59   Temp 37.1 °C (98.8 °F)   Resp 17   Ht 5' 7\" (1.702 m)   Wt 65.8 kg (145 lb 1 oz)   SpO2 100%   BMI 22.72 kg/m²     Patient is status post Procedure(s):  LEFT FEMUR INSERTION INTRA MEDULLARY NAIL-URGENT. Nausea/Vomiting: Controlled. Postoperative hydration reviewed and adequate. Pain:  Pain Scale 1: Numeric (0 - 10) (03/07/21 1545)  Pain Intensity 1: 0 (03/07/21 1545)   Managed. Neurological Status:   Neuro (WDL): Exceptions to WDL (03/07/21 1530)   At baseline. Mental Status and Level of Consciousness: Arousable. Pulmonary Status:   O2 Device: Nasal cannula (03/07/21 1545)   Adequate oxygenation and airway patent. Complications related to anesthesia: None    Post-anesthesia assessment completed. No concerns. Signed By: Monster Velazquez MD    3/7/2021  Post anesthesia nausea and vomiting:  controlled      INITIAL Post-op Vital signs:   Vitals Value Taken Time   /58 03/07/21 1545   Temp 37.1 °C (98.8 °F) 03/07/21 1530   Pulse 59 03/07/21 1600   Resp 15 03/07/21 1600   SpO2 99 % 03/07/21 1600   Vitals shown include unvalidated device data.

## 2021-03-07 NOTE — PERIOP NOTES
TRANSFER - IN REPORT:    Verbal report received from Rebekah [facility] (name) on Rosalee Samuels  being received from Ortho (unit) for ordered procedure      Report consisted of patients Situation, Background, Assessment and   Recommendations(SBAR). Information from the following report(s) SBAR and Kardex  was reviewed with the receiving nurse. Opportunity for questions and clarification was provided. Assessment completed upon patients arrival to unit and care assumed.

## 2021-03-07 NOTE — BRIEF OP NOTE
Brief Postoperative Note    Patient: Matilde Current  YOB: 1932  MRN: [id number]    Date of Procedure: 3/7/2021     Pre-Op Diagnosis: LEFT HIP FRACTURE    Post-Op Diagnosis: Same as preoperative diagnosis. Procedure(s):  LEFT FEMUR INSERTION INTRA MEDULLARY NAIL-URGENT    Surgeon(s):  Mandy Carmona DO    Surgical Assistant: None    Anesthesia: Other     Estimated Blood Loss (mL): less than 618     Complications: None    Specimens: * No specimens in log *     Implants:   Implant Name Type Inv.  Item Serial No.  Lot No. LRB No. Used Action   NAIL IM [id number] 130DEG SHT L PROX FEM GRN TI MIKHAIL - SNA  NAIL IM [id number] [id number] 130DEG SHT L PROX FEM GRN TI MIKHAIL NA [employer]_ [id number] Left 1 Implanted       Drains: * No LDAs found *    Findings: L IT hip fx    Electronically Signed by Betty Ramirez DO on 3/7/2021 at 3:19 PM

## 2021-03-07 NOTE — PROGRESS NOTES
End of Shift Note    Bedside shift change report given to Rebekah 1827 (oncoming nurse) by Mary Brooks, RN (offgoing nurse). Report included the following information SBAR, Intake/Output and Recent Results    Shift worked:  7p - 7a      Shift summary and any significant changes:     pt is oriented to person and place. [place] ( (B/I hearing aid in place) Denies pain or sob. Scheduled tylenol given. Pt is NPO for her procedure today. CHG bath given. Concerns for physician to address:  surgery      Zone phone for oncoming shift:          Activity:  Activity Level: Bed Rest  Number times ambulated in hallways past shift: 0  Number of times OOB to chair past shift: 0    Cardiac:   Cardiac Monitoring: No           Access:   Current line(s): PIV     Genitourinary:   Urinary status: kolb    Respiratory:   O2 Device: Room air  Chronic home O2 use?: NO  Incentive spirometer at bedside: YES     GI:     Current diet:  DIET NPO Except Meds  Passing flatus: YES  Tolerating current diet: YES       Pain Management:   Patient states pain is manageable on current regimen: YES    Skin:  Ryne Score: 14  Interventions: increase time out of bed, foam dressing, PT/OT consult and internal/external urinary devices    Patient Safety:  Fall Score:  Total Score: 4  Interventions: assistive device (walker, cane, etc) and pt to call before getting OOB  High Fall Risk: Yes    Length of Stay:  Expected LOS: - - -  Actual LOS: 1      Mary Brooks, RN

## 2021-03-07 NOTE — PERIOP NOTES
TRANSFER - OUT REPORT:    Verbal report given to TEENA Valenzuela(name) on Bren Galarza  being transferred to [facility](unit) for routine post - op       Report consisted of patients Situation, Background, Assessment and   Recommendations(SBAR). Information from the following report(s) SBAR, Kardex, ED Summary, OR Summary, Procedure Summary, Intake/Output, MAR, Accordion, Recent Results, Med Rec Status, Cardiac Rhythm SB/NSR PVC's, Alarm Parameters , Pre Procedure Checklist, Procedure Verification and Quality Measures was reviewed with the receiving nurse. Opportunity for questions and clarification was provided.       Patient transported with:   Registered Nurse

## 2021-03-08 LAB
ANION GAP SERPL CALC-SCNC: 5 MMOL/L (ref 5–15)
BASOPHILS # BLD: 0.1 K/UL (ref 0–0.1)
BASOPHILS NFR BLD: 1 % (ref 0–1)
BUN SERPL-MCNC: 17 MG/DL (ref 6–20)
BUN/CREAT SERPL: 22 (ref 12–20)
CALCIUM SERPL-MCNC: 8.1 MG/DL (ref 8.5–10.1)
CHLORIDE SERPL-SCNC: 113 MMOL/L (ref 97–108)
CO2 SERPL-SCNC: 22 MMOL/L (ref 21–32)
CREAT SERPL-MCNC: 0.76 MG/DL (ref 0.55–1.02)
DIFFERENTIAL METHOD BLD: ABNORMAL
EOSINOPHIL # BLD: 0 K/UL (ref 0–0.4)
EOSINOPHIL NFR BLD: 0 % (ref 0–7)
ERYTHROCYTE [DISTWIDTH] IN BLOOD BY AUTOMATED COUNT: 12.7 % (ref 11.5–14.5)
GLUCOSE SERPL-MCNC: 142 MG/DL (ref 65–100)
HCT VFR BLD AUTO: 26.4 % (ref 35–47)
HGB BLD-MCNC: 8.9 G/DL (ref 11.5–16)
IMM GRANULOCYTES # BLD AUTO: 0 K/UL (ref 0–0.04)
IMM GRANULOCYTES NFR BLD AUTO: 0 % (ref 0–0.5)
LYMPHOCYTES # BLD: 1 K/UL (ref 0.8–3.5)
LYMPHOCYTES NFR BLD: 11 % (ref 12–49)
MCH RBC QN AUTO: 31.8 PG (ref 26–34)
MCHC RBC AUTO-ENTMCNC: 33.7 G/DL (ref 30–36.5)
MCV RBC AUTO: 94.3 FL (ref 80–99)
MONOCYTES # BLD: 0.9 K/UL (ref 0–1)
MONOCYTES NFR BLD: 9 % (ref 5–13)
NEUTS SEG # BLD: 7.8 K/UL (ref 1.8–8)
NEUTS SEG NFR BLD: 79 % (ref 32–75)
NRBC # BLD: 0 K/UL (ref 0–0.01)
NRBC BLD-RTO: 0 PER 100 WBC
PLATELET # BLD AUTO: 159 K/UL (ref 150–400)
PMV BLD AUTO: 10.1 FL (ref 8.9–12.9)
POTASSIUM SERPL-SCNC: 3.5 MMOL/L (ref 3.5–5.1)
RBC # BLD AUTO: 2.8 M/UL (ref 3.8–5.2)
SODIUM SERPL-SCNC: 140 MMOL/L (ref 136–145)
WBC # BLD AUTO: 9.8 K/UL (ref 3.6–11)

## 2021-03-08 PROCEDURE — 51798 US URINE CAPACITY MEASURE: CPT

## 2021-03-08 PROCEDURE — 85025 COMPLETE CBC W/AUTO DIFF WBC: CPT

## 2021-03-08 PROCEDURE — 36415 COLL VENOUS BLD VENIPUNCTURE: CPT

## 2021-03-08 PROCEDURE — 65660000000 HC RM CCU STEPDOWN

## 2021-03-08 PROCEDURE — 80048 BASIC METABOLIC PNL TOTAL CA: CPT

## 2021-03-08 PROCEDURE — 97535 SELF CARE MNGMENT TRAINING: CPT

## 2021-03-08 PROCEDURE — 74011250637 HC RX REV CODE- 250/637: Performed by: ORTHOPAEDIC SURGERY

## 2021-03-08 PROCEDURE — 97162 PT EVAL MOD COMPLEX 30 MIN: CPT

## 2021-03-08 PROCEDURE — 97165 OT EVAL LOW COMPLEX 30 MIN: CPT

## 2021-03-08 PROCEDURE — 97530 THERAPEUTIC ACTIVITIES: CPT

## 2021-03-08 PROCEDURE — 74011250636 HC RX REV CODE- 250/636: Performed by: ORTHOPAEDIC SURGERY

## 2021-03-08 PROCEDURE — 77010033678 HC OXYGEN DAILY

## 2021-03-08 RX ADMIN — CASTOR OIL AND BALSAM, PERU: 788; 87 OINTMENT TOPICAL at 08:47

## 2021-03-08 RX ADMIN — Medication 1 AMPULE: at 08:45

## 2021-03-08 RX ADMIN — POLYETHYLENE GLYCOL 3350 17 G: 17 POWDER, FOR SOLUTION ORAL at 08:47

## 2021-03-08 RX ADMIN — ACETAMINOPHEN 650 MG: 325 TABLET ORAL at 12:19

## 2021-03-08 RX ADMIN — VANCOMYCIN HYDROCHLORIDE 1000 MG: 1 INJECTION, POWDER, LYOPHILIZED, FOR SOLUTION INTRAVENOUS at 03:46

## 2021-03-08 RX ADMIN — Medication 2000 UNITS: at 08:47

## 2021-03-08 RX ADMIN — MEMANTINE HYDROCHLORIDE 10 MG: 10 TABLET ORAL at 08:46

## 2021-03-08 RX ADMIN — DOCUSATE SODIUM 50MG AND SENNOSIDES 8.6MG 1 TABLET: 8.6; 5 TABLET, FILM COATED ORAL at 08:47

## 2021-03-08 RX ADMIN — ACETAMINOPHEN 650 MG: 325 TABLET ORAL at 06:58

## 2021-03-08 RX ADMIN — Medication 10 ML: at 14:12

## 2021-03-08 RX ADMIN — HYDROCHLOROTHIAZIDE 12.5 MG: 25 TABLET ORAL at 08:47

## 2021-03-08 RX ADMIN — ACETAMINOPHEN 650 MG: 325 TABLET ORAL at 17:33

## 2021-03-08 RX ADMIN — LEVOFLOXACIN 250 MG: 5 INJECTION, SOLUTION INTRAVENOUS at 12:21

## 2021-03-08 RX ADMIN — CASTOR OIL AND BALSAM, PERU: 788; 87 OINTMENT TOPICAL at 17:34

## 2021-03-08 RX ADMIN — Medication 10 ML: at 06:59

## 2021-03-08 RX ADMIN — DOCUSATE SODIUM 50MG AND SENNOSIDES 8.6MG 1 TABLET: 8.6; 5 TABLET, FILM COATED ORAL at 17:33

## 2021-03-08 RX ADMIN — ENOXAPARIN SODIUM 40 MG: 40 INJECTION SUBCUTANEOUS at 08:46

## 2021-03-08 RX ADMIN — ASPIRIN 81 MG: 81 TABLET, COATED ORAL at 08:46

## 2021-03-08 RX ADMIN — Medication 1 AMPULE: at 20:52

## 2021-03-08 RX ADMIN — Medication 10 ML: at 22:00

## 2021-03-08 RX ADMIN — Medication 10 ML: at 20:52

## 2021-03-08 RX ADMIN — SODIUM CHLORIDE 125 ML/HR: 9 INJECTION, SOLUTION INTRAVENOUS at 09:54

## 2021-03-08 RX ADMIN — SERTRALINE HYDROCHLORIDE 100 MG: 50 TABLET ORAL at 08:46

## 2021-03-08 RX ADMIN — OXYCODONE 2.5 MG: 5 TABLET ORAL at 14:08

## 2021-03-08 NOTE — PROGRESS NOTES
Transition of Care Plan:  Disposition: SNF vs daughter's home with her 24/7 caregiver and [facility] services- pending progress with therapy   Follow up appointments: ortho  DME needed: if patient d/c to daughter's home will need wheelchair and bedside commode  Transportation at Discharge: BLS vs daughter pending progress  Keys or means to access home: daughter to provide  IM Medicare letter: to be provided prior to d/c  Caregiver Contact: daughter Tere Daniels [phone] or [phone]  Discharge Caregiver contacted prior to discharge? yes       Reason for Admission: left hip fracture. RUR Score:  15%                  Plan for utilizing home health: per recommendation          PCP: First and Last name:  Collette Colt, DO   Name of Practice: [facility]    Are you a current patient: Yes/No: yes   Approximate date of last visit: 2-3 months ago    Can you participate in a virtual visit with your PCP: yes                    Current Advanced Directive/Advance Care Plan: Full Code [address] 13 (ACP) Conversation      Date of Conversation: 3/8/21  Conducted with: Healthcare Decision Maker: Next of Kin by law (only applies in absence of a Healthcare Power of  or Legal Guardian)    Healthcare Decision Maker:     Click here to [address] including selection of the [address] Relationship (ie \"Primary\")  Today we documented Decision Maker(s) consistent with Legal Next of Kin hierarchy.     Content/Action Overview:   Patient does not have ACP and does not have the capacity to create one at this time  Reviewed DNR/DNI and patient elects Full Code (Attempt Resuscitation)    Healthcare Decision Maker:   Click here to complete [address] including selection of the Healthcare Decision Maker Relationship (ie \"Primary\")                        Transition of Care Plan:                      CM made room visit with patient who had daughter, Zoe Moreno [phone] (home)/[phone] (cell), at bedside. Pt's daughter confirmed demographics, insurance, and emergency contact on file. Patient lives with her 24/7 caregiver who is a family friend. Pt goes to daughter's house every other weekend to giver caregiver a break. Additionally, CG's sister will assist patient if CG is in need to take care of anything personal. Patient also has a male friend that comes and visits often, which allows CG to go to the grocery store and run errands for her and patient as needed. Patient needs assistance with ADLS. Patient recently stopped needing a RW to ambulate but within the last week she started slipping so family arranged home physical therapy through [facility] that was suppose to start today. [facility] only provides PT services. Pt does not drive and relies on daughter and caregiver for transportation. Pt has DME including RW, shower chair, and bed rail. Patient has used [facility] services but unsure of agency name. Pt has never been to SNF/IPR. CM discussed d/c planning with pt's daughter and informed her of recommendation for SNF. Per daughter she would prefer for patient to d/c to home with her CG. CM informed dtr that patient is currently requiring x2 assist for transfers. Daughter mentioned that patient could d/c to daughter's home and the CG already mentioned that she would come to dt's home to assist as well. Dtr would like for patient to work with therapy again tomorrow before making a final decision as we are only POD 1. CM provided daughter with a SNF list to review in the event patient is unable to d/c home.      Care Management Interventions  PCP Verified by CM: Yes(last seen 2-3 months ago)  Transition of Care Consult (CM Consult): Discharge Planning  Discharge Durable Medical Equipment: No  Physical Therapy Consult: Yes  Occupational Therapy Consult: Yes  Speech Therapy Consult: No  Current Support Network: Lives with Caregiver, Own Home(pt and 24/7 cg live in single level home)  Confirm Follow Up Transport: Family  Discharge Location  Discharge Placement: Unable to determine at this time    Chino Mccrary, [address]

## 2021-03-08 NOTE — PROGRESS NOTES
9:50am  Attempted to call daughter at number listed on emergency contacts however no answer and no answering machine. Contacted pt's son Yakov Mcdowell [phone] who reported pt's daughter is the best person to talk to about initial assessment and d/c planning but cannot remember her cell phone number by memory. Per son he will reach out to sister to contact CM.      Kelle Michele, [address], [address]

## 2021-03-08 NOTE — PROGRESS NOTES
Problem: Mobility Impaired (Adult and Pediatric)  Goal: *Acute Goals and Plan of Care (Insert Text)  Description: FUNCTIONAL STATUS PRIOR TO ADMISSION: Patient was independent and active without use of DME.    HOME SUPPORT PRIOR TO ADMISSION: The patient lived alone with family and possibly caregivers to provide assistance. Physical Therapy Goals  Initiated 3/8/2021  1. Patient will move from supine to sit and sit to supine  in bed with moderate assistance  within 7 day(s). 2.  Patient will transfer from bed to chair and chair to bed with moderate assistance  using the least restrictive device within 7 day(s). 3.  Patient will perform sit to stand with moderate assistance  within 7 day(s). 4.  Patient will ambulate with moderate assistance  for 10 feet with the least restrictive device within 7 day(s). Outcome: Not Met    PHYSICAL THERAPY EVALUATION  Patient: Matheus Gallardo (22 y.o. female)  Date: 3/8/2021  Primary Diagnosis: Hip fracture (HCC) [S72.009A]  Procedure(s) (LRB):  LEFT FEMUR INSERTION INTRA MEDULLARY NAIL-URGENT (Left) 1 Day Post-Op   Precautions:   WBAT, Fall      ASSESSMENT  Based on the objective data described below, the patient presents with increased pain, generalized weakness, decreased activity tolerance, confusion, impaired balance and overall decreased functional mobility. Pt was received in supine and cleared by nursing to mobilize. VSS during session. She required 2 person A for all mobility. Came to the EOB. Stood 2x with RW. Transfers improved after each trial. She was able to side step to the [facility] and then to get to the chair. She was left sitting up in the chair and fatigued with very little activity. Ice applied and left working with OT. Current Level of Function Impacting Discharge (mobility/balance): mod/max A x 2    Functional Outcome Measure:   The patient scored Total: 30/100 on the Barthel Index which is indicative of 70% impaired ability to care for basic self needs/dependency on others. Other factors to consider for discharge: lives alone     Patient will benefit from skilled therapy intervention to address the above noted impairments. PLAN :  Recommendations and Planned Interventions: bed mobility training, transfer training, gait training, therapeutic exercises, patient and family training/education, and therapeutic activities      Frequency/Duration: Patient will be followed by physical therapy:  daily to address goals. Recommendation for discharge: (in order for the patient to meet his/her long term goals)  Therapy up to 5 days/week in SNF setting    This discharge recommendation:  Has been made in collaboration with the attending provider and/or case management    IF patient discharges home will need the following DME: to be determined (TBD)         SUBJECTIVE:   Patient stated you want me to stand up? Anita Hashimoto    OBJECTIVE DATA SUMMARY:   HISTORY:    Past Medical History:   Diagnosis Date    COPD (chronic obstructive pulmonary disease) (HCC)     Dementia (HCC)     GERD (gastroesophageal reflux disease)     HTN (hypertension)     Hypercholesterolemia     MCI (mild cognitive impairment)      Past Surgical History:   Procedure Laterality Date    HX COLONOSCOPY  6/14    has had several    HX MARLEN AND BSO         Personal factors and/or comorbidities impacting plan of care: [facility]: Private residence  # Steps to Enter: 7  Rails to Enter: Yes  Hand Rails : Bilateral  One/Two Story Residence: One story  Living Alone: Yes  Support Systems: Home care staff, Family member(s)  Patient Expects to be Discharged to[de-identified] Private residence  Current DME Used/Available at Home: Paige Gambles, rollator  Tub or Shower Type: Tub/Shower combination    EXAMINATION/PRESENTATION/DECISION MAKING:   Critical Behavior:  Neurologic State: Drowsy, Confused  Orientation Level: Oriented to person  Cognition: Follows commands     Hearing:   Auditory  Auditory Impairment: Hard of hearing, bilateral, Hearing aid(s)(LEft in room at bedside)  Hearing Aids/Status: Bilateral, At bedside  Skin:  dressing intact  Edema: WDL  Range Of Motion:  AROM: Generally decreased, functional           PROM: Generally decreased, functional           Strength:    Strength: Generally decreased, functional                    Tone & Sensation:   Tone: Normal              Sensation: Intact               Coordination:  Coordination: Generally decreased, functional  Vision:      Functional Mobility:  Bed Mobility:  Rolling: Moderate assistance  Supine to Sit: Maximum assistance;Assist x2     Scooting: Minimum assistance  Transfers:  Sit to Stand: Moderate assistance;Assist x2  Stand to Sit: Moderate assistance;Assist x2  Stand Pivot Transfers: Moderate assistance;Assist x2     Bed to Chair: Moderate assistance;Assist x2              Balance:   Sitting: Intact  Standing: Impaired  Standing - Static: Fair;Constant support  Standing - Dynamic : Poor;Constant support  Ambulation/Gait Training:         Able to side step to [address],[place] [address] and chair with RW and min A x 2                   Therapeutic Exercises: Ankle pumps    Functional Measure:  Barthel Index:    Bathin  Bladder: 5  Bowels: 5  Groomin  Dressin  Feeding: 10  Mobility: 0  Stairs: 0  Toilet Use: 0  Transfer (Bed to Chair and Back): 5  Total: 30/100       The Barthel ADL Index: Guidelines  1. The index should be used as a record of what a patient does, not as a record of what a patient could do. 2. The main aim is to establish degree of independence from any help, physical or verbal, however minor and for whatever reason. 3. The need for supervision renders the patient not independent. 4. A patient's performance should be established using the best available evidence. Asking the patient, friends/relatives and nurses are the usual sources, but direct observation and common sense are also important.  However direct testing is not needed. 5. Usually the patient's performance over the preceding 24-48 hours is important, but occasionally longer periods will be relevant. 6. Middle categories imply that the patient supplies over 50 per cent of the effort. 7. Use of aids to be independent is allowed. Andre Kaiser., Barthel, D.W. ([id number]). Functional evaluation: the Barthel Index. [address] [facility] (14)2. Michelle Riding yoko AARON Aguilera, Felicitas Delong., Scarlet Tobias., [place], [address] (1999). Measuring the change indisability after inpatient rehabilitation; comparison of the responsiveness of the Barthel Index and Functional [place] Measure. Journal of Neurology, Neurosurgery, and Psychiatry, 66(4), 006-640. ANNALISE Colunga, TITUS Bueno, & Sherly Dixon MFERNANDO (2004.) Assessment of post-stroke quality of life in cost-effectiveness studies: The usefulness of the Barthel Index and the EuroQoL-5D. Quality of Life Research, 15, 599-97        Physical Therapy Evaluation Charge Determination   History Examination Presentation Decision-Making   HIGH Complexity :3+ comorbidities / personal factors will impact the outcome/ POC  MEDIUM Complexity : 3 Standardized tests and measures addressing body structure, function, activity limitation and / or participation in recreation  MEDIUM Complexity : Evolving with changing characteristics  Other outcome measures barthel  MEDIUM      Based on the above components, the patient evaluation is determined to be of the following complexity level: MEDIUM    Pain Rating:  No numerical value provided    Activity Tolerance:   Fair    After treatment patient left in no apparent distress:   Sitting in chair and Call bell within reach    COMMUNICATION/EDUCATION:   The patients plan of care was discussed with: Occupational therapist and Registered nurse. Fall prevention education was provided and the patient/caregiver indicated understanding.  and Patient is unable to participate in goal setting and plan of care.    Thank you for this referral.  Sherman Murillo, PT, DPT   Time Calculation: 35 mins

## 2021-03-08 NOTE — ROUTINE PROCESS
End of Shift Note    Bedside shift change report given to Qi (oncoming nurse) by Felix Zuleta RN (offgoing nurse). Report included the following information SBAR, Kardex, Intake/Output, MAR and Accordion    Shift worked:  7-7     Shift summary and any significant changes:     Pt had surgery, no complain of pain, VS stable     Concerns for physician to address:       Zone phone for oncoming shift:          Activity:  Activity Level: Bed Rest  Number times ambulated in hallways past shift: 0  Number of times OOB to chair past shift: 0    Cardiac:   Cardiac Monitoring: No      Cardiac Rhythm: Normal sinus rhythm    Access:   Current line(s): PIV     Genitourinary:   Urinary status: kolb    Respiratory:   O2 Device: Nasal cannula  Chronic home O2 use?: NO  Incentive spirometer at bedside: NO     GI:     Current diet:  DIET REGULAR  Passing flatus: YES  Tolerating current diet: YES       Pain Management:   Patient states pain is manageable on current regimen: YES    Skin:  Ryne Score: 14  Interventions: increase time out of bed and PT/OT consult    Patient Safety:  Fall Score:  Total Score: 4  Interventions: bed/chair alarm, assistive device (walker, cane, etc) and gripper socks  High Fall Risk: Yes    Length of Stay:  Expected LOS: - - -  Actual LOS: 1      Felix Zuleta RN

## 2021-03-08 NOTE — PROGRESS NOTES
Hospitalist Progress Note    NAME: Kami Leavitt   :  1932   MRN:  [id number]       Assessment / Plan:    Comminuted intertrochanteric fracture of the left femur s/p cephalomedullary nailing   Osteoporosis  - Transferred from [facility] for management of  left hip fracture. S/p GLF.  - As needed morphine for pain  - Continue vitamin D. Will need to be on bisphosphonate. Not available in formulary.   - Lovenox for DVT prohylaxis  - PT/OT consult, rec SNF     Acute kidney injury, resolved  - Likely prerenal.   - Continue with IV fluid for next 24 hours    Urinary tract infection  - UA suggestive of UTI. U Cx pending. Started on Levaquin empirically. Day 2. Penicillin allergy. F/U urine culture results    Hypertension  - continue hydrochlorothiazide. Ensure adequate pain control. If BP remains high, will adjust dose.     Normocytic anemia  - monitor H and H    Hypokalemia, replaced  - Replete and montor    Depression  Dementia  - Continue with Zoloft  - Continue with Namenda. Holding  Aricept while on levaquin to avoid QT prolonogation. Code Status: Presumed full code unable to reach daughter  Surrogate Decision MakerMnorbert Boateng Child [phone]       Carly Capps Child [phone]             DVT Prophylaxis: SCDs for now  GI Prophylaxis: not indicated     Baseline: Use a walker       Subjective:     Chief Complaint / Reason for Physician Visit  Discussed with RN events overnight. Tolerated orhto procedure well  Denies pain at surgical site      Review of Systems:  Symptom Y/N Comments  Symptom Y/N Comments   Fever/Chills    Chest Pain     Poor Appetite    Edema     Cough    Abdominal Pain     Sputum    Joint Pain     SOB/PLUMMER    Pruritis/Rash     Nausea/vomit    Tolerating PT/OT     Diarrhea    Tolerating Diet     Constipation    Other       Could NOT obtain due to:      Objective:     VITALS:   Last 24hrs VS reviewed since prior progress note.  Most recent are:  Patient Vitals for the past 24 hrs:   Temp Pulse Resp BP SpO2   03/08/21 0745 97.8 °F (36.6 °C) 66 16 (!) 152/53 98 %   03/08/21 0328 98 °F (36.7 °C) 64 18 (!) 121/54 94 %   03/07/21 2311 99 °F (37.2 °C) 74 16 (!) 153/50 96 %   03/07/21 1949 97.5 °F (36.4 °C) 76 18 (!) 141/69 96 %   03/07/21 1821 98.3 °F (36.8 °C) 65 16 130/61 96 %   03/07/21 1725 98.3 °F (36.8 °C) 60 16 136/79 96 %   03/07/21 1634 98.7 °F (37.1 °C) 65 16 137/69 96 %   03/07/21 1615 98.3 °F (36.8 °C) 62 17 (!) 161/51 95 %   03/07/21 1600 -- (!) 59 15 (!) 173/48 99 %   03/07/21 1545 -- (!) 59 17 (!) 150/58 100 %   03/07/21 1540 -- (!) 57 16 (!) 162/57 100 %   03/07/21 1535 -- (!) 57 16 (!) 147/46 100 %   03/07/21 1530 98.8 °F (37.1 °C) (!) 58 16 (!) 158/56 100 %   03/07/21 1411 98.7 °F (37.1 °C) 64 17 (!) 188/60 98 %   03/07/21 1135 98.3 °F (36.8 °C) 63 18 (!) 149/51 99 %       Intake/Output Summary (Last 24 hours) at 3/8/2021 0813  Last data filed at 3/8/2021 0715  Gross per 24 hour   Intake 550 ml   Output 1250 ml   Net -700 ml        I had a face to face encounter and independently examined this patient on 3/8/2021, as outlined below:  PHYSICAL EXAM:  General: WD, WN. Alert, cooperative, no acute distress    EENT:  EOMI. Anicteric sclerae. MMM  Resp:  CTA bilaterally, no wheezing or rales. No accessory muscle use  CV:  Regular  rhythm,  No edema  GI:  Soft, Non distended, Non tender. +Bowel sounds  Neurologic:  Alert and oriented X 3, normal speech,   Psych:   Good insight. Not anxious nor agitated  Skin:  No rashes.   No jaundice    Reviewed most current lab test results and cultures  YES  Reviewed most current radiology test results   YES  Review and summation of old records today    NO  Reviewed patient's current orders and MAR    YES  PMH/SH reviewed - no change compared to H&P  ________________________________________________________________________  Care Plan discussed with:    Comments   Patient x    Family      RN     Care Manager Consultant                        Multidiciplinary team rounds were held today with , nursing, pharmacist and clinical coordinator. Patient's plan of care was discussed; medications were reviewed and discharge planning was addressed. ________________________________________________________________________      Total CRITICAL CARE TIME Spent:   Minutes non procedure based      Comments   >50% of visit spent in counseling and coordination of care x    ________________________________________________________________________  Ethan Clarke MD     Procedures: see electronic medical records for all procedures/Xrays and details which were not copied into this note but were reviewed prior to creation of Plan. LABS:  I reviewed today's most current labs and imaging studies.   Pertinent labs include:  Recent Labs     03/08/21  0103 03/07/21  0408 03/06/21  1404   WBC 9.8 8.7 11.2*   HGB 8.9* 9.8* 12.1   HCT 26.4* 29.0* 35.4    172 222     Recent Labs     03/08/21  0103 03/07/21  0408 03/06/21  1404    142 143   K 3.5 3.3* 3.7   * 110* 105   CO2 22 26 25   * 110* 183*   BUN 17 22* 20   CREA 0.76 0.94 1.46*   CA 8.1* 8.2* 8.9   ALB  --   --  3.4*   TBILI  --   --  0.8   ALT  --   --  26   INR  --   --  1.1       Signed: Ethan Clarke MD

## 2021-03-08 NOTE — PROGRESS NOTES
Problem: Self Care Deficits Care Plan (Adult)  Goal: *Acute Goals and Plan of Care (Insert Text)  Description:   FUNCTIONAL STATUS PRIOR TO ADMISSION: Pt confused this date, providing minimal PLOF details. States she mobilizes using a RW. Gets in tub to bathe. Initially stated she dresses herself, then later stated \"It's hard to get my arms over my head. \" Per chart, Pt sustained at least x3 falls PTA. HOME SUPPORT: Lives alone with either family or caregivers providing assist with IADLs. Occupational Therapy Goals  Initiated 3/8/2021  1. Patient will perform grooming tasks seated with set-up assist within 7 day(s). 2.  Patient will perform upper body dressing with set-up assist within 7 day(s). 3.  Patient will perform lower body dressing with moderate assistance within 7 day(s). 4.  Patient will perform BSC/toilet transfers with minimal assistance using least restrictive device within 7 day(s). 5.  Patient will perform all aspects of toileting with minimal assistance within 7 day(s). Outcome: Not Met   OCCUPATIONAL THERAPY EVALUATION  Patient: Micaela Bautista (52 y.o. female)  Date: 3/8/2021  Primary Diagnosis: Hip fracture (HCC) [S72.009A]  Procedure(s) (LRB):  LEFT FEMUR INSERTION INTRA MEDULLARY NAIL-URGENT (Left) 1 Day Post-Op   Precautions: WBAT, Fall    ASSESSMENT  Based on the objective data described below, the patient presents with confusion, drowsiness, decreased endurance, decreased functional mobility and GW on POD #1 s/p L femur IM nailing, all of which are limiting her independence with performing her ADL routine. Pt was admitted to [facility] ED via EMS d/t GLF PTA. Per chart Pt has experienced at least x3 falls PTA. She is very [place] and requires b/l HAs. VSS with activity on RA. Pt fluctuated b/w alert and drowsy/confused t/o session, and provided inconsistent details re: PLOF.  Pt currently requires up to Total A with basic ADLs and Mod Assist x2 with bed<>chair transfers using RW and pivoting with b/l feet. Pt will require SNF level rehab once medically cleared in order to maximize her safety and independence with performing ADLs and related functional mobility. Pt is a high fall risk and would be unsafe to return home with unclear level of support/assist at this time. Pt will require review of anterior hip precautions next session d/t confusion this date. Current Level of Function Impacting Discharge (ADLs/self-care): Up to Total A    Functional Outcome Measure: The patient scored 30/100 on the Barthel Index outcome measure which is indicative of requiring up to 70% assist with ADLs/related mobility. Other factors to consider for discharge: Dementia, Falls PTA, Uncertain PLOF     Patient will benefit from skilled therapy intervention to address the above noted impairments. PLAN :  Recommendations and Planned Interventions: self care training, functional mobility training, balance training, therapeutic activities, endurance activities, patient education, and home safety training    Frequency/Duration: Patient will be followed by occupational therapy 4 times a week to address goals. Recommendation for discharge: (in order for the patient to meet his/her long term goals)  Therapy up to 5 days/week in SNF setting    This discharge recommendation:  Has been made in collaboration with the attending provider and/or case management    IF patient discharges home will need the following DME: RW, BSC, Possibly long handled AE pending progress. SUBJECTIVE:   Patient stated Oh they help me with everything. \" (Pt unable to specify if family or paid caregivers assist her at baseline)    OBJECTIVE DATA SUMMARY:   HISTORY:   Past Medical History:   Diagnosis Date    COPD (chronic obstructive pulmonary disease) (HCC)     Dementia (HCC)     GERD (gastroesophageal reflux disease)     HTN (hypertension)     Hypercholesterolemia     MCI (mild cognitive impairment)      Past Surgical History:   Procedure Laterality Date    HX COLONOSCOPY  6/14    has had several    HX MARLEN AND BSO       Expanded or extensive additional review of patient history: Dementia    Home Situation  Home Environment: Private residence  # Steps to Enter: 7  Rails to Enter: Yes  Hand Rails : Bilateral  One/Two Story Residence: One story  Living Alone: Yes  Support Systems: Home care staff, Family member(s)  Patient Expects to be Discharged to[de-identified] Private residence  Current DME Used/Available at Home: Bárbara Mcardle, rollator  Tub or Shower Type: Tub/Shower combination    Hand dominance: Right    EXAMINATION OF PERFORMANCE DEFICITS:  Cognitive/Behavioral Status:  Neurologic State: Drowsy; Confused  Orientation Level: Oriented to person                Skin: Surgical dressing intact    Edema: None observed    Hearing: Auditory  Auditory Impairment: Hard of hearing, bilateral, Hearing aid(s)(LEft in room at bedside)  Hearing Aids/Status: Bilateral, At bedside    Vision/Perceptual:            WFL                         Range of Motion:  AROM: Generally decreased, functional  PROM: Generally decreased, functional                      Strength:  Strength: Generally decreased, functional                Coordination:  Coordination: Generally decreased, functional  Fine Motor Skills-Upper: Left Intact; Right Intact    Gross Motor Skills-Upper: Left Intact; Right Intact(within available ROM)    Tone & Sensation:  Tone: Normal  Sensation: Intact                      Balance:  Sitting: Intact  Standing: Impaired  Standing - Static: Fair;Constant support  Standing - Dynamic : Poor;Constant support    Functional Mobility and Transfers for ADLs:  Bed Mobility:  Rolling: Moderate assistance  Supine to Sit: Maximum assistance;Assist x2  Scooting: Minimum assistance    Transfers:  Sit to Stand:  Moderate assistance;Assist x2  Stand to Sit: Moderate assistance;Assist x2  Bed to Chair: Moderate assistance;Assist x2  Bathroom Mobility: Moderate assistance(Assist x2 to [facility])  Assistive Device : Walker, rolling    ADL Assessment:  Patient recalled and demonstrated avoiding extreme planes of movement with Left LE during ADLs and functional mobility with verbal/visual/tactile cues. Feeding: Setup    Oral Facial Hygiene/Grooming: Minimum assistance(d/t drowsiness and confusion only this date, otherwise Setup)    Bathing: Maximum assistance    Upper Body Dressing: Moderate assistance(d/t mildly decreased b/l shoulder ROM)    Lower Body Dressing: Total assistance    Toileting: Total assistance     ADL Intervention and task modifications:      Lower Body Dressing Assistance  Protective Undergarmet: Total assistance (dependent)  Socks: Total assistance (dependent)  Position Performed: Seated edge of bed    Standing: Patient instructed and demonstrated to step into walker to increase safety of joint and fall prevention. Functional Measure:  Barthel Index:    Bathin  Bladder: 5  Bowels: 5  Groomin  Dressin  Feeding: 10  Mobility: 0  Stairs: 0  Toilet Use: 0  Transfer (Bed to Chair and Back): 5  Total: 30/100        The Barthel ADL Index: Guidelines  1. The index should be used as a record of what a patient does, not as a record of what a patient could do. 2. The main aim is to establish degree of independence from any help, physical or verbal, however minor and for whatever reason. 3. The need for supervision renders the patient not independent. 4. A patient's performance should be established using the best available evidence. Asking the patient, friends/relatives and nurses are the usual sources, but direct observation and common sense are also important. However direct testing is not needed. 5. Usually the patient's performance over the preceding 24-48 hours is important, but occasionally longer periods will be relevant. 6. Middle categories imply that the patient supplies over 50 per cent of the effort.   7. Use of aids to be independent is allowed. Sachi Ruiz., Barthel, D.W. (1135). Functional evaluation: the Barthel Index. [address] (14)2. AARON Ibarra, Rai Borjas., [facility]., [place], [address] (1999). Measuring the change indisability after inpatient rehabilitation; comparison of the responsiveness of the Barthel Index and Functional [place] Measure. Journal of Neurology, Neurosurgery, and Psychiatry, 66(4), 534-285. ANNALISE Woodard, TITUS Bueno, & Parisa Melvin M.A. (2004.) Assessment of post-stroke quality of life in cost-effectiveness studies: The usefulness of the Barthel Index and the EuroQoL-5D. Quality of Life Research, 15, 680-05        Occupational Therapy Evaluation Charge Determination   History Examination Decision-Making   LOW Complexity : Brief history review  MEDIUM Complexity : 3-5 performance deficits relating to physical, cognitive , or psychosocial skils that result in activity limitations and / or participation restrictions MEDIUM Complexity : Patient may present with comorbidities that affect occupational performnce. Miniml to moderate modification of tasks or assistance (eg, physical or verbal ) with assesment(s) is necessary to enable patient to complete evaluation       Based on the above components, the patient evaluation is determined to be of the following complexity level: MEDIUM  Pain Rating:  Denied pain t/o session.      Activity Tolerance:   Fair and requires rest breaks    Vitals:    03/08/21 0909 03/08/21 0914 03/08/21 0920 03/08/21 0925   BP: (!) 150/66 (!) 153/56 (!) 148/58 (!) 152/67   BP 1 Location: Right upper arm      BP Patient Position: At rest;Supine      Pulse: 91 89 94 88   Resp:       Temp:       SpO2:    96%   Weight:       Height:            After treatment patient left in no apparent distress:    Sitting in chair and Call bell within reach    COMMUNICATION/EDUCATION:   The patients plan of care was discussed with: Physical therapist, Registered nurse, and Patient . Patient is unable to participate in goal setting and plan of care.       Thank you for this referral.  Andie Caba, OTR/L  Time Calculation: 27 mins

## 2021-03-08 NOTE — PROGRESS NOTES
End of Shift Note    Bedside shift change report given to Jose Hollis (oncoming nurse) by Roberto Motta (offgoing nurse). Report included the following information SBAR, Kardex, Intake/Output, MAR and Recent Results    Shift worked:  7p-7a     Shift summary and any significant changes:          Concerns for physician to address:       Zone phone for oncoming shift:   [phone]       Activity:  Activity Level: Bed Rest  Number times ambulated in hallways past shift: 0  Number of times OOB to chair past shift: 0    Cardiac:   Cardiac Monitoring: No      Cardiac Rhythm: Normal sinus rhythm    Access:   Current line(s): PIV     Genitourinary:   Urinary status: due to void    Respiratory:   O2 Device: Nasal cannula  Chronic home O2 use?: NO  Incentive spirometer at bedside: YES     GI:     Current diet:  DIET REGULAR  Passing flatus: YES  Tolerating current diet: YES       Pain Management:   Patient states pain is manageable on current regimen: YES    Skin:  Ryne Score: 14  Interventions: float heels, increase time out of bed and foam dressing    Patient Safety:  Fall Score:  Total Score: 4  Interventions: bed/chair alarm, assistive device (walker, cane, etc), gripper socks and pt to call before getting OOB  High Fall Risk: Yes    Length of Stay:  Expected LOS: - - -  Actual LOS: [place] [address]

## 2021-03-08 NOTE — OP NOTES
[address]  OPERATIVE REPORT    Name:  Saroj Coy  MR#:  [id number]  :  1932  ACCOUNT #:  [de-identified]  DATE OF SERVICE:  2021    PREOPERATIVE DIAGNOSIS:  Left intertrochanteric hip fracture. POSTOPERATIVE DIAGNOSIS:  Left intertrochanteric hip fracture. PROCEDURE PERFORMED:  Left hip cephalomedullary nailing. SURGEON:  Shoshana Shen DO    ASSISTANT:  None. ANESTHESIA:  General.    COMPLICATIONS:  None. SPECIMENS REMOVED:  None. IMPLANTS:  synthes tfna 11. ESTIMATED BLOOD LOSS:  100 mL. DISPOSITION:  Stable to PACU. INDICATIONS FOR THE PROCEDURE:  The patient is an 20-year-old female who presented to the hospital after a ground-level fall. She was found to have an intertrochanteric left hip fracture and Orthopedics was consulted for management of this fracture. We discussed treatment options with the patient and family and elected to proceed with left hip cephalomedullary nailing after we discussed risks of infection, blood loss, neurovascular injury, anesthesia risk, risks secondary to the patient's comorbidities. Once she was medically optimized and ready for surgical treatment, she was seen in preoperative holding area. History and physical forms and consent forms were confirmed in her chart and her operative extremity was marked by Orthopedics. DESCRIPTION OF OPERATION:  The patient was taken to the OR and given sedation and intubated by Anesthesia. We placed her on the Swain table and used gentle traction to perform reduction of the fracture. We ensured all prominences were carefully padded and we prepped and draped in the normal sterile fashion after taking x-rays to confirm appropriate fracture reduction. We called time-out after sterile prepping and draping. The patient was identified by name, date of birth, operative site and operative procedure.   After all were in agreement, the left hip was the operative extremity, an incision was made for approach to the tip of the greater trochanter. I inserted guidewire for placement of our nail. We overreamed with the opening reamer. We then based on preoperative templating placed a size 11 Synthes intermediate TFNA device. This was placed without difficulty. We made an incision at the lateral border of the hip replacement of the helical blade. We predrilled and placed our helical blade using x-ray to confirm appropriate tip to apex distance less than 25 mm. We then placed our distal locking screw through another small incision at the lateral hip. We took final x-rays and thoroughly irrigated the wound. Closure with combination of #1 Vicryl, 2-0 Vicryl and staples were performed. Sterile dressings were applied. The patient was transferred to PACU. She will be monitored closely in the recovery unit and kept in the hospital as we assess possible need for rehab.         Melissa Saavedra DO [id number]  D:  03/07/2021 15:42  T:  03/07/2021 22:21  JOB #:  [id number]

## 2021-03-08 NOTE — PROGRESS NOTES
Physician Progress Note      Johan Salinas  [facility] #:                  [id number]  :                       1932  ADMIT DATE:       3/6/2021 6:22 PM  [address] [place] DATE:  RESPONDING  PROVIDER #:        Luz Marina Guadalupe MD          QUERY TEXT:    Pt admitted with left intertrochanteric hip fracture. Pt noted to have osteoporosis. If possible, please document in progress notes and discharge summary if you are evaluating and/or treating any of the following: The medical record reflects the following:  Risk Factors: 80 F w/hx; Alzheimer's and frequent falls  Clinical Indicators: Pt s/p ground level fall at home with history of age-related osteoporosis  Treatment: Vit D, s/p left hip cephalomedullary nailing    Please call with questions. Thank you. [email or web address]. [employer]  [address]  Options provided:  -- Pathological left intertrochanteric hip fracture  -- Osteoporotic left intertrochanteric hip fracture  -- Osteoporotic left intertrochanteric hip fracture following fall which would not usually break a normal, healthy bone  -- Traumatic left intertrochanteric hip fracture  -- Other - I will add my own diagnosis  -- Disagree - Not applicable / Not valid  -- Disagree - Clinically unable to determine / Unknown  -- Refer to Clinical Documentation Reviewer    PROVIDER RESPONSE TEXT:    This patient has an osteoporotic fracture of left intertrochanteric hip.     Query created by: Brooke Gaspar on 3/8/2021 3:55 PM      Electronically signed by:  Luz Marina Guadalupe MD 3/8/2021 5:46 PM

## 2021-03-09 ENCOUNTER — APPOINTMENT (OUTPATIENT)
Dept: ULTRASOUND IMAGING | Age: 86
DRG: 481 | End: 2021-03-09
Attending: PHYSICIAN ASSISTANT
Payer: MEDICARE

## 2021-03-09 LAB
BACTERIA SPEC CULT: ABNORMAL
CC UR VC: ABNORMAL
HGB BLD-MCNC: 8.3 G/DL (ref 11.5–16)
SERVICE CMNT-IMP: ABNORMAL

## 2021-03-09 PROCEDURE — 97110 THERAPEUTIC EXERCISES: CPT

## 2021-03-09 PROCEDURE — 74011250636 HC RX REV CODE- 250/636: Performed by: NURSE PRACTITIONER

## 2021-03-09 PROCEDURE — 74011250637 HC RX REV CODE- 250/637: Performed by: ORTHOPAEDIC SURGERY

## 2021-03-09 PROCEDURE — 36415 COLL VENOUS BLD VENIPUNCTURE: CPT

## 2021-03-09 PROCEDURE — 85018 HEMOGLOBIN: CPT

## 2021-03-09 PROCEDURE — 2709999900 HC NON-CHARGEABLE SUPPLY

## 2021-03-09 PROCEDURE — 74011250637 HC RX REV CODE- 250/637: Performed by: PHYSICIAN ASSISTANT

## 2021-03-09 PROCEDURE — 65660000000 HC RM CCU STEPDOWN

## 2021-03-09 PROCEDURE — 94760 N-INVAS EAR/PLS OXIMETRY 1: CPT

## 2021-03-09 PROCEDURE — 93971 EXTREMITY STUDY: CPT

## 2021-03-09 PROCEDURE — 77030038269 HC DRN EXT URIN PURWCK BARD -A

## 2021-03-09 PROCEDURE — 74011250636 HC RX REV CODE- 250/636: Performed by: ORTHOPAEDIC SURGERY

## 2021-03-09 PROCEDURE — 74011250636 HC RX REV CODE- 250/636: Performed by: HOSPITALIST

## 2021-03-09 PROCEDURE — 97530 THERAPEUTIC ACTIVITIES: CPT

## 2021-03-09 RX ORDER — SODIUM CHLORIDE 9 MG/ML
100 INJECTION, SOLUTION INTRAVENOUS CONTINUOUS
Status: DISCONTINUED | OUTPATIENT
Start: 2021-03-09 | End: 2021-03-10

## 2021-03-09 RX ORDER — VANCOMYCIN HYDROCHLORIDE
1250 ONCE
Status: COMPLETED | OUTPATIENT
Start: 2021-03-09 | End: 2021-03-09

## 2021-03-09 RX ORDER — ACETAMINOPHEN 500 MG
500 TABLET ORAL EVERY 6 HOURS
Status: DISCONTINUED | OUTPATIENT
Start: 2021-03-09 | End: 2021-03-12 | Stop reason: HOSPADM

## 2021-03-09 RX ORDER — AMLODIPINE BESYLATE 2.5 MG/1
2.5 TABLET ORAL DAILY
Status: DISCONTINUED | OUTPATIENT
Start: 2021-03-10 | End: 2021-03-11

## 2021-03-09 RX ORDER — ENOXAPARIN SODIUM 100 MG/ML
1 INJECTION SUBCUTANEOUS EVERY 12 HOURS
Status: DISCONTINUED | OUTPATIENT
Start: 2021-03-09 | End: 2021-03-12

## 2021-03-09 RX ORDER — HYDROCODONE BITARTRATE AND ACETAMINOPHEN 5; 325 MG/1; MG/1
1 TABLET ORAL
Status: DISCONTINUED | OUTPATIENT
Start: 2021-03-09 | End: 2021-03-12 | Stop reason: HOSPADM

## 2021-03-09 RX ORDER — LEVOFLOXACIN 5 MG/ML
250 INJECTION, SOLUTION INTRAVENOUS EVERY 24 HOURS
Status: DISCONTINUED | OUTPATIENT
Start: 2021-03-10 | End: 2021-03-10 | Stop reason: ALTCHOICE

## 2021-03-09 RX ADMIN — CASTOR OIL AND BALSAM, PERU: 788; 87 OINTMENT TOPICAL at 17:12

## 2021-03-09 RX ADMIN — ENOXAPARIN SODIUM 40 MG: 40 INJECTION SUBCUTANEOUS at 08:18

## 2021-03-09 RX ADMIN — ACETAMINOPHEN 650 MG: 325 TABLET ORAL at 00:38

## 2021-03-09 RX ADMIN — SODIUM CHLORIDE 500 ML: 9 INJECTION, SOLUTION INTRAVENOUS at 06:40

## 2021-03-09 RX ADMIN — Medication 1 AMPULE: at 21:08

## 2021-03-09 RX ADMIN — ACETAMINOPHEN 650 MG: 325 TABLET ORAL at 12:26

## 2021-03-09 RX ADMIN — Medication 10 ML: at 21:08

## 2021-03-09 RX ADMIN — Medication 2000 UNITS: at 08:18

## 2021-03-09 RX ADMIN — HYDROCHLOROTHIAZIDE 12.5 MG: 25 TABLET ORAL at 08:18

## 2021-03-09 RX ADMIN — ASPIRIN 81 MG: 81 TABLET, COATED ORAL at 08:19

## 2021-03-09 RX ADMIN — Medication 1 AMPULE: at 08:30

## 2021-03-09 RX ADMIN — HYDROCODONE BITARTRATE AND ACETAMINOPHEN 1 TABLET: 5; 325 TABLET ORAL at 17:08

## 2021-03-09 RX ADMIN — POLYETHYLENE GLYCOL 3350 17 G: 17 POWDER, FOR SOLUTION ORAL at 08:18

## 2021-03-09 RX ADMIN — SODIUM CHLORIDE 100 ML/HR: 9 INJECTION, SOLUTION INTRAVENOUS at 08:27

## 2021-03-09 RX ADMIN — DOCUSATE SODIUM 50MG AND SENNOSIDES 8.6MG 1 TABLET: 8.6; 5 TABLET, FILM COATED ORAL at 08:19

## 2021-03-09 RX ADMIN — LEVOFLOXACIN 250 MG: 5 INJECTION, SOLUTION INTRAVENOUS at 12:27

## 2021-03-09 RX ADMIN — Medication 10 ML: at 14:00

## 2021-03-09 RX ADMIN — ENOXAPARIN SODIUM 70 MG: 80 INJECTION, SOLUTION INTRAVENOUS; SUBCUTANEOUS at 18:11

## 2021-03-09 RX ADMIN — ACETAMINOPHEN 650 MG: 325 TABLET ORAL at 05:43

## 2021-03-09 RX ADMIN — SERTRALINE HYDROCHLORIDE 100 MG: 50 TABLET ORAL at 08:19

## 2021-03-09 RX ADMIN — SODIUM CHLORIDE 100 ML/HR: 9 INJECTION, SOLUTION INTRAVENOUS at 14:28

## 2021-03-09 RX ADMIN — MEMANTINE HYDROCHLORIDE 10 MG: 10 TABLET ORAL at 08:19

## 2021-03-09 RX ADMIN — VANCOMYCIN HYDROCHLORIDE 1250 MG: 10 INJECTION, POWDER, LYOPHILIZED, FOR SOLUTION INTRAVENOUS at 18:00

## 2021-03-09 RX ADMIN — ACETAMINOPHEN 500 MG: 500 TABLET ORAL at 17:08

## 2021-03-09 RX ADMIN — Medication 10 ML: at 05:43

## 2021-03-09 RX ADMIN — CASTOR OIL AND BALSAM, PERU: 788; 87 OINTMENT TOPICAL at 08:27

## 2021-03-09 NOTE — PROGRESS NOTES
Transition of Care Plan:  Disposition: 1st choice SNF at [facility]. 2nd choice daughter's home with her 24/7 caregiver [facility]  Follow up appointments: ortho  DME needed: if patient d/c to daughter's home will need wheelchair and bedside commode  Transportation at Discharge: [address] [place] [address] or means to access home: daughter to provide  IM Medicare letter: to be provided prior to d/c  Caregiver Contact: daughter Don Richey [phone] or [phone]  Discharge Caregiver contacted prior to discharge? yes     CM spoke with therapy who reported that patient was unable to take any steps but was able to pivot from bed to chair with x2 assist.     CM spoke with patient's daughter Don Richey [phone] to inform her of therapy session today. Per daughter she would like a referral to be sent to [facility]. Dtr reported this is the only SNF option she likes. If [facility] does not accept patient, daughter reported she thinks the plan would be for patient to dc to dtr's home with the 24/7 CG assistance. Verbal consent obtained from daughter for [facility] due to patient having dementia. Referral sent to [facility] via allscripts.      Chino Corona, [address]

## 2021-03-09 NOTE — PROGRESS NOTES
Received message from patient's nurse [facility] stating :    Patient with 200mL urine output for dayshift and zero or small incontinence output for this shift. Bladder scan to find 140mL       Discussion / orders:    ·  ml bolus  · NS at 100 ml/hr x 24 hr         Please note that this note was dictated using Dragon computer voice recognition software. Quite often unanticipated grammatical, syntax, homophones, and other interpretive errors are inadvertently transcribed by the computer software. Please disregard these errors. Please excuse any errors that have escaped final proofreading.

## 2021-03-09 NOTE — PROGRESS NOTES
Sent the results of the doppler study to Dr. Berna Pappas via [employer] message. She said that she would review the results.

## 2021-03-09 NOTE — PROGRESS NOTES
ORTHO - Progress Note  Post Op day: 2 Days [address]     [id number]  female    80 y.o.    1932    Admit date:3/6/2021  Date of Surgery:3/7/2021   Procedures:Procedure(s):LEFT FEMUR INSERTION INTRA MEDULLARY NAIL-URGENT  Surgeon:Surgeon(s) and Role:   Geoffrey Curtis, DO - Primary        SUBJECTIVE:     Brandy Salgado is a 80 y.o. female resting in the bed. Patient has complaints of appropriate post-op pain. Denies F/C, nausea, vomiting, dizziness, lightheadedness, chest pain, or shortness of breath. OBJECTIVE:       Physical Exam:  General: alert, cooperative, no distress. Gastrointestinal:  non-distended . Cardiovascular: equal pulses in the lower extremities,  Brisk cap refill in all distal extremities   Genitourinary: Voiding independently   Respiratory: No respiratory distress   Neurological:Neurovascular exam within normal limits. Senstion intact: LE bilat. Motor: + DF/PF/EHL. Musculoskeletal: Sumaya's sign negative in bilateral lower extremities. Calves soft, supple, left calf +TTP    Dressing/Wound:  Trace proximal drainage on bandage and intact. No significant erythema or swelling. Vital Signs:         Patient Vitals for the past 8 hrs:   BP Temp Pulse Resp SpO2   21 0751 (!) 159/79 98.2 °F (36.8 °C) 66 18 96 %   21 0329 (!) 145/67 98.7 °F (37.1 °C) 84 18 95 %                                          Temp (24hrs), Av.2 °F (36.8 °C), Min:97.7 °F (36.5 °C), Max:98.8 °F (37.1 °C)    Date 21 0700 - 03/10/21 0659   Shift 2828-4819 0114-1129 9432-5395 24 Hour Total   INTAKE   Shift Total(mL/kg)       OUTPUT   Urine(mL/kg/hr) 300   300   Shift Total(mL/kg) 300(4.6)   300(4.6)   Weight (kg) 65.8 65.8 65.8 65.8     Labs:        Recent Labs     21  0517 21  0103 21  1404 21  1404   HCT  --  26.4*   < > 35.4   HGB 8.3* 8.9*   < > 12.1   INR  --   --   --  1.1    < > = values in this interval not displayed.      PT/OT: ASSESSMENT / PLAN:   Active Problems:    Hip fracture ([facility] [address] (3/6/2021)       -  Early dementia  -  Continue PT/OT WBAT  -  DVT prophylaxis- SCD w/ ASA 81, Lovenox 40 QD  -  Doppler US ordered  -  DC planning - SNF v/s  Home w/ HH/PT     Signed By: Mahalia Kanner, PA-C

## 2021-03-09 NOTE — PROGRESS NOTES
Bedside and Verbal shift change report given to Christian Roberts RN (oncoming nurse) by Andie Mckenzie, student nurse and Lazaro Guerrero RN (offgoing nurse). Report included the following information SBAR, Kardex, Intake/Output and MAR.

## 2021-03-09 NOTE — PROGRESS NOTES
Pharmacy Automatic Renal Dosing Protocol - Antimicrobials    Indication for Antimicrobials: UTI     Current Regimen of Each Antimicrobial:  Levofloxacin 250 mg IV q24h (Start Date 3/7; Day # 3)  Vancomycin  1250mg IV x1 then 750mg IV q12h   (Start Date 3/9; Day #1  Previous Antimicrobial Therapy:    Significant Cultures:   3/6 urine: Staph Epid >100 kg resistant to quinolone.  (PS info to Dr Mejia since ABX is ordered for 3 days)  3/6 UA cloudy, 3+ bacteria, + nitrites, WBC 10-20    Radiology / Imaging results: (X-ray, CT scan or MRI):     Paralysis, amputations, malnutrition:     Labs:  Recent Labs     21  0103 21  0408   CREA 0.76 0.94   BUN 17 22*   WBC 9.8 8.7     Temp (24hrs), Av.2 °F (36.8 °C), Min:97.4 °F (36.3 °C), Max:98.8 °F (37.1 °C)      Is the Patient on Dialysis? No    Creatinine Clearance (mL/min):   CrCl (Actual Body Weight): 53.1  CrCl (Adjusted Body Weight): 51.1  CrCl (Ideal Body Weight): 49.8    Impression/Plan:   Vancomycin added for Staph Epi in urine resistant to Levaquin  Vanco 750mg Iv q12h for estimated Peak/Trough  28/13.6 mcg/ml  Levofloxacin Iv continued x 3 more days  Daily BMP added + CBC x1  Antimicrobial stop date 6 days total (LEvaquin),  5 days for Vancomycin     Pharmacy will follow daily and adjust medications as appropriate for renal function and/or serum levels.    Thank you,  Jensen Munoz, [facility]

## 2021-03-09 NOTE — PROGRESS NOTES
End of Shift Note    Bedside shift change report given to Cecile Valderrama RN (oncoming nurse) by Eliodoro Sicard (offgoing nurse). Report included the following information SBAR, Kardex, OR Summary, Intake/Output, MAR and Recent Results    Shift worked:  Day     Shift summary and any significant changes:     Patient bladder scanned multiple times during day. Did not void until this evening very dark urine. Fluids might need to be continued to hydrate patient. Worked with PT/OT trouble following commands completely needs reminders. Up to chair today. Concerns for physician to address:  See above     Zone phone for oncoming shift:   [phone]       Activity:  Activity Level: Up with Assistance  Number times ambulated in hallways past shift: 0  Number of times OOB to chair past shift: 1    Cardiac:   Cardiac Monitoring: No      Cardiac Rhythm: Normal sinus rhythm    Access:   Current line(s): PIV     Genitourinary:   Urinary status: voiding and external catheter    Respiratory:   O2 Device: Room air  Chronic home O2 use?: NO  Incentive spirometer at bedside: NO     GI:  Last Bowel Movement Date: 03/08/21  Current diet:  DIET REGULAR  Passing flatus: YES  Tolerating current diet: YES       Pain Management:   Patient states pain is manageable on current regimen: YES    Skin:  Ryne Score: 15  Interventions: float heels, foam dressing and PT/OT consult    Patient Safety:  Fall Score:  Total Score: 5  Interventions: bed/chair alarm, assistive device (walker, cane, etc), gripper socks and pt to call before getting OOB  High Fall Risk: Yes    Length of Stay:  Expected LOS: 4d 7h  Actual LOS: [address] [facility]

## 2021-03-09 NOTE — PROGRESS NOTES
Occupational Therapy  Medical record reviewed. Nurse reports that pt is going off the floor for testing at this time. Will defer and continue to tollow.

## 2021-03-09 NOTE — PROGRESS NOTES
Hospitalist Progress Note    NAME: Mukul Murphy   :  1932   MRN:  [id number]       Assessment / Plan:  Comminuted intertrochanteric fracture of the left femur s/p cephalomedullary nailing   Osteoporosis  - Transferred from [facility] for management of  left hip fracture. S/p GLF. ORIF 3/7 Dr Raz Nichols   DVT prophylaxis:   Pain management:  Scheduled tylenol + norco prn   PT: SNF   Hb: Admission Hb 12.1, down to 9.8, 8.3  Grace: out and voiding   BM: on bowel regiment    Acute LLE DVT   + Left calf TTP: duplex US with acute DVT   Acute occlusive thrombus present in the left distal femoral vein. Acute occlusive thrombus present in the left popliteal vein. -start therapeutic lovenox , if hb stabl, will transition to eliquis      Acute kidney injury, resolved  - Likely prerenal but also questioning if she had underlying ckd 3 ( gfr in the past on 2 occasions 42)   - Continue with IV fluid for next 24 hours     Urinary tract infection poa   - UA suggestive of UTI  LVQ started on admission, completed 3 doses but UC with staphh epi   Rx with vanco IV x 5 days     Hypertension  - BP remain high side   On hctz at home, I would avoid use of diuretics in this elderly frail pt to avoid dehydration   Dc hctz, start low dose norvasc, adjust a needed      Normocytic anemia  -likely acute blood loss  Admission Hb 12.1, down to 9.8, 8.3  - monitor H and H    Hypokalemia, replaced as needed     Depression  Dementia  -wo behavioral issues , MS stable   - Continue with Zoloft  - Continue with Namenda. Holding  Aricept while on levaquin to avoid QT prolonogation.     3/9: spoke with son over the phone; discussed uti and dvt Rx; family would be agreeable for pt to go to the SNF in [place] if the one at [facility] is not accepting     Code Status: Full code   DVT Prophylaxis: SCDs for now     Baseline: Use a walker  Disposition: SNF      Subjective:     Chief Complaint / Reason for Physician Visit: fu uti, dvt. Hip fracture   Denies pain  Pleasantly confused     Discussed with RN events overnight. Review of Systems:  Symptom Y/N Comments  Symptom Y/N Comments   Fever/Chills    Chest Pain     Poor Appetite    Edema     Cough    Abdominal Pain     Sputum    Joint Pain     SOB/PLUMMER    Pruritis/Rash     Nausea/vomit    Tolerating PT/OT     Diarrhea    Tolerating Diet     Constipation    Other       Could NOT obtain due to: Dementia      Objective:     VITALS:   Last 24hrs VS reviewed since prior progress note. Most recent are:  Patient Vitals for the past 24 hrs:   Temp Pulse Resp BP SpO2   03/09/21 1639 97.4 °F (36.3 °C) 69 18 (!) 187/65 98 %   03/09/21 1149 97.7 °F (36.5 °C) 65 18 (!) 152/46 97 %   03/09/21 0751 98.2 °F (36.8 °C) 66 18 (!) 159/79 96 %   03/09/21 0329 98.7 °F (37.1 °C) 84 18 (!) 145/67 95 %   03/09/21 0012 98.8 °F (37.1 °C) 100 18 (!) 151/93 --   03/08/21 2059 98.1 °F (36.7 °C) 75 17 (!) 141/43 96 %       Intake/Output Summary (Last 24 hours) at 3/9/2021 1658  Last data filed at 3/9/2021 1610  Gross per 24 hour   Intake --   Output 570 ml   Net -570 ml        I had a face to face encounter and independently examined this patient on 3/9/2021, as outlined below:  PHYSICAL EXAM:  General: WD, WN. Alert, cooperative, no acute distress    EENT:  EOMI. Anicteric sclerae. MMM  Resp:  CTA bilaterally, no wheezing or rales. No accessory muscle use  CV:  Regular  rhythm,  No edema  GI:  Soft, Non distended, Non tender. +Bowel sounds  Neurologic:  Alert and oriented X 1-2, normal speech,   Psych:   poor insight. Not anxious nor agitated  Skin:  No rashes.   No jaundice    Reviewed most current lab test results and cultures  YES  Reviewed most current radiology test results   YES  Review and summation of old records today    NO  Reviewed patient's current orders and MAR    YES  PMH/SH reviewed - no change compared to H&P  ________________________________________________________________________  Care Plan discussed with:    Comments   Patient x    Family      RN x    Care Manager x    Consultant  x Ortho                     x Multidiciplinary team rounds were held today with , nursing, pharmacist and clinical coordinator. Patient's plan of care was discussed; medications were reviewed and discharge planning was addressed. ________________________________________________________________________      Total CRITICAL CARE TIME Spent: 35  Minutes non procedure based      Comments   >50% of visit spent in counseling and coordination of care x    ________________________________________________________________________  Dylan Nath MD     Procedures: see electronic medical records for all procedures/Xrays and details which were not copied into this note but were reviewed prior to creation of Plan. LABS:  I reviewed today's most current labs and imaging studies.   Pertinent labs include:  Recent Labs     03/09/21  0517 03/08/21 0103 03/07/21  0408   WBC  --  9.8 8.7   HGB 8.3* 8.9* 9.8*   HCT  --  26.4* 29.0*   PLT  --  159 172     Recent Labs     03/08/21  0103 03/07/21  0408    142   K 3.5 3.3*   * 110*   CO2 22 26   * 110*   BUN 17 22*   CREA 0.76 0.94   CA 8.1* 8.2*       Signed: Dylan Nath MD

## 2021-03-09 NOTE — PROGRESS NOTES
Problem: Mobility Impaired (Adult and Pediatric)  Goal: *Acute Goals and Plan of Care (Insert Text)  Description: FUNCTIONAL STATUS PRIOR TO ADMISSION: Patient was independent and active without use of DME.    HOME SUPPORT PRIOR TO ADMISSION: The patient lived alone with family and possibly caregivers to provide assistance. Physical Therapy Goals  Initiated 3/8/2021  1. Patient will move from supine to sit and sit to supine  in bed with moderate assistance  within 7 day(s). 2.  Patient will transfer from bed to chair and chair to bed with moderate assistance  using the least restrictive device within 7 day(s). 3.  Patient will perform sit to stand with moderate assistance  within 7 day(s). 4.  Patient will ambulate with moderate assistance  for 10 feet with the least restrictive device within 7 day(s). Outcome: Progressing Towards Goal     PHYSICAL THERAPY TREATMENT  Patient: Margarita Salazar (89 y.o. female)  Date: 3/9/2021  Diagnosis: Hip fracture ([facility] [address] [S72.009A] <principal problem not specified>  Procedure(s) (LRB):  LEFT FEMUR INSERTION INTRA MEDULLARY NAIL-URGENT (Left) 2 Days Post-Op  Precautions: WBAT, Fall  Chart, physical therapy assessment, plan of care and goals were reviewed. ASSESSMENT  Patient continues with skilled PT services and is progressing towards goals. Pt was received in supine and cleared by nursing to mobilize. More confused today. She required 2 person A to come to the EOB. Noted skin tear on back and nursing made aware and dressed it. She was able to stand with RW. Attempted to take a step forward, was unable to place enough weight on the L to propel the RLE forward. She was able to pivot over to the chair with 2 person A. Performed a few LE exercises. Ice applied.      Current Level of Function Impacting Discharge (mobility/balance): mod/max A x 2    Other factors to consider for discharge:          PLAN :  Patient continues to benefit from skilled intervention to address the above impairments. Continue treatment per established plan of care. to address goals. Recommendation for discharge: (in order for the patient to meet his/her long term goals)  Therapy up to 5 days/week in SNF setting    This discharge recommendation:  Has been made in collaboration with the attending provider and/or case management    IF patient discharges home will need the following DME: to be determined (TBD)       SUBJECTIVE:   Patient stated i can't.     OBJECTIVE DATA SUMMARY:   Critical Behavior:  Neurologic State: Alert, Confused  Orientation Level: Oriented to person, Oriented to place, Disoriented to time, Disoriented to situation  Cognition: Follows commands, Impaired decision making     Functional Mobility Training:  Bed Mobility:  Rolling: Moderate assistance  Supine to Sit: Maximum assistance;Assist x2     Scooting: Moderate assistance        Transfers:  Sit to Stand: Moderate assistance;Assist x2  Stand to Sit: Moderate assistance;Assist x2        Bed to Chair: Moderate assistance;Assist x2                    Balance:  Sitting: Impaired  Sitting - Static: Good (unsupported)  Sitting - Dynamic: Fair (occasional)  Standing: Impaired  Standing - Static: Fair;Constant support  Standing - Dynamic : Poor;Constant support        Therapeutic Exercises: Ankle pumps, LAQ,   Pain Rating:  No number value provided     Activity Tolerance:   Fair    After treatment patient left in no apparent distress:   Sitting in chair, Call bell within reach, and Bed / chair alarm activated    COMMUNICATION/COLLABORATION:   The patients plan of care was discussed with: Occupational therapist and Registered nurse.      Brandin Lewis PT, DPT   Time Calculation: 30 mins

## 2021-03-10 LAB
ANION GAP SERPL CALC-SCNC: 8 MMOL/L (ref 5–15)
BUN SERPL-MCNC: 9 MG/DL (ref 6–20)
BUN/CREAT SERPL: 14 (ref 12–20)
CALCIUM SERPL-MCNC: 7.9 MG/DL (ref 8.5–10.1)
CHLORIDE SERPL-SCNC: 112 MMOL/L (ref 97–108)
CO2 SERPL-SCNC: 21 MMOL/L (ref 21–32)
CREAT SERPL-MCNC: 0.66 MG/DL (ref 0.55–1.02)
ERYTHROCYTE [DISTWIDTH] IN BLOOD BY AUTOMATED COUNT: 12.8 % (ref 11.5–14.5)
GLUCOSE SERPL-MCNC: 101 MG/DL (ref 65–100)
HCT VFR BLD AUTO: 23.8 % (ref 35–47)
HGB BLD-MCNC: 7.9 G/DL (ref 11.5–16)
MCH RBC QN AUTO: 31.2 PG (ref 26–34)
MCHC RBC AUTO-ENTMCNC: 33.2 G/DL (ref 30–36.5)
MCV RBC AUTO: 94.1 FL (ref 80–99)
NRBC # BLD: 0 K/UL (ref 0–0.01)
NRBC BLD-RTO: 0 PER 100 WBC
PLATELET # BLD AUTO: 196 K/UL (ref 150–400)
PMV BLD AUTO: 10.4 FL (ref 8.9–12.9)
POTASSIUM SERPL-SCNC: 3.2 MMOL/L (ref 3.5–5.1)
RBC # BLD AUTO: 2.53 M/UL (ref 3.8–5.2)
SARS-COV-2, COV2: NORMAL
SODIUM SERPL-SCNC: 141 MMOL/L (ref 136–145)
WBC # BLD AUTO: 8.7 K/UL (ref 3.6–11)

## 2021-03-10 PROCEDURE — 97530 THERAPEUTIC ACTIVITIES: CPT

## 2021-03-10 PROCEDURE — 77030038269 HC DRN EXT URIN PURWCK BARD -A

## 2021-03-10 PROCEDURE — 77010033678 HC OXYGEN DAILY

## 2021-03-10 PROCEDURE — 2709999900 HC NON-CHARGEABLE SUPPLY

## 2021-03-10 PROCEDURE — 36415 COLL VENOUS BLD VENIPUNCTURE: CPT

## 2021-03-10 PROCEDURE — 74011250637 HC RX REV CODE- 250/637: Performed by: PHYSICIAN ASSISTANT

## 2021-03-10 PROCEDURE — 97535 SELF CARE MNGMENT TRAINING: CPT | Performed by: OCCUPATIONAL THERAPIST

## 2021-03-10 PROCEDURE — 74011250637 HC RX REV CODE- 250/637: Performed by: ORTHOPAEDIC SURGERY

## 2021-03-10 PROCEDURE — 77030040393 HC DRSG OPTIFOAM GENT MDII -B

## 2021-03-10 PROCEDURE — U0005 INFEC AGEN DETEC AMPLI PROBE: HCPCS

## 2021-03-10 PROCEDURE — 97116 GAIT TRAINING THERAPY: CPT

## 2021-03-10 PROCEDURE — 80048 BASIC METABOLIC PNL TOTAL CA: CPT

## 2021-03-10 PROCEDURE — 94760 N-INVAS EAR/PLS OXIMETRY 1: CPT

## 2021-03-10 PROCEDURE — 97110 THERAPEUTIC EXERCISES: CPT

## 2021-03-10 PROCEDURE — 74011250636 HC RX REV CODE- 250/636: Performed by: NURSE PRACTITIONER

## 2021-03-10 PROCEDURE — 74011250636 HC RX REV CODE- 250/636: Performed by: HOSPITALIST

## 2021-03-10 PROCEDURE — 85027 COMPLETE CBC AUTOMATED: CPT

## 2021-03-10 PROCEDURE — 74011250637 HC RX REV CODE- 250/637: Performed by: HOSPITALIST

## 2021-03-10 PROCEDURE — 65660000000 HC RM CCU STEPDOWN

## 2021-03-10 RX ORDER — POTASSIUM CHLORIDE 750 MG/1
40 TABLET, FILM COATED, EXTENDED RELEASE ORAL
Status: COMPLETED | OUTPATIENT
Start: 2021-03-10 | End: 2021-03-10

## 2021-03-10 RX ADMIN — Medication 10 ML: at 13:38

## 2021-03-10 RX ADMIN — DOCUSATE SODIUM 50MG AND SENNOSIDES 8.6MG 1 TABLET: 8.6; 5 TABLET, FILM COATED ORAL at 08:10

## 2021-03-10 RX ADMIN — AMLODIPINE BESYLATE 2.5 MG: 2.5 TABLET ORAL at 08:10

## 2021-03-10 RX ADMIN — CASTOR OIL AND BALSAM, PERU: 788; 87 OINTMENT TOPICAL at 08:11

## 2021-03-10 RX ADMIN — SODIUM CHLORIDE 100 ML/HR: 9 INJECTION, SOLUTION INTRAVENOUS at 05:40

## 2021-03-10 RX ADMIN — ENOXAPARIN SODIUM 70 MG: 80 INJECTION, SOLUTION INTRAVENOUS; SUBCUTANEOUS at 06:00

## 2021-03-10 RX ADMIN — VANCOMYCIN HYDROCHLORIDE 750 MG: 750 INJECTION, POWDER, LYOPHILIZED, FOR SOLUTION INTRAVENOUS at 05:40

## 2021-03-10 RX ADMIN — DOCUSATE SODIUM 50MG AND SENNOSIDES 8.6MG 1 TABLET: 8.6; 5 TABLET, FILM COATED ORAL at 17:18

## 2021-03-10 RX ADMIN — Medication 10 ML: at 06:00

## 2021-03-10 RX ADMIN — Medication 10 ML: at 21:33

## 2021-03-10 RX ADMIN — ASPIRIN 81 MG: 81 TABLET, COATED ORAL at 08:10

## 2021-03-10 RX ADMIN — Medication 1 AMPULE: at 21:33

## 2021-03-10 RX ADMIN — ACETAMINOPHEN 500 MG: 500 TABLET ORAL at 06:00

## 2021-03-10 RX ADMIN — POTASSIUM CHLORIDE 40 MEQ: 750 TABLET, FILM COATED, EXTENDED RELEASE ORAL at 12:56

## 2021-03-10 RX ADMIN — MEMANTINE HYDROCHLORIDE 10 MG: 10 TABLET ORAL at 08:10

## 2021-03-10 RX ADMIN — ACETAMINOPHEN 500 MG: 500 TABLET ORAL at 00:25

## 2021-03-10 RX ADMIN — Medication 1 AMPULE: at 08:10

## 2021-03-10 RX ADMIN — ACETAMINOPHEN 500 MG: 500 TABLET ORAL at 12:56

## 2021-03-10 RX ADMIN — Medication 2000 UNITS: at 08:10

## 2021-03-10 RX ADMIN — SERTRALINE HYDROCHLORIDE 100 MG: 50 TABLET ORAL at 08:10

## 2021-03-10 RX ADMIN — LEVOFLOXACIN 250 MG: 5 INJECTION, SOLUTION INTRAVENOUS at 12:56

## 2021-03-10 RX ADMIN — CASTOR OIL AND BALSAM, PERU: 788; 87 OINTMENT TOPICAL at 17:17

## 2021-03-10 RX ADMIN — ACETAMINOPHEN 500 MG: 500 TABLET ORAL at 17:18

## 2021-03-10 RX ADMIN — POLYETHYLENE GLYCOL 3350 17 G: 17 POWDER, FOR SOLUTION ORAL at 08:10

## 2021-03-10 RX ADMIN — ENOXAPARIN SODIUM 70 MG: 80 INJECTION, SOLUTION INTRAVENOUS; SUBCUTANEOUS at 17:19

## 2021-03-10 RX ADMIN — VANCOMYCIN HYDROCHLORIDE 750 MG: 750 INJECTION, POWDER, LYOPHILIZED, FOR SOLUTION INTRAVENOUS at 17:18

## 2021-03-10 NOTE — WOUND CARE
Wound care consult for a skin tear to the left side of the back that was present on admission and noted within 24 hours of admission. Pt. Had a ground level fall at home and fractured her left femur which required cephalomedullary nailing that was done 4 days ago. Also being treated for LLE DVT. Assessment today: Patient wearing bilateral hearing aids. She is alert, talkative. The left back skin tear is superficial but does need to be dressed to prevent worsening and to prevent painful turns and repositioning. [place], moist wound bed. Dressing on the left hip is present with minimal drainage on it. WOUND POA CONDITIONS    Wound Back Left;Medial partial thickness skin tear w/ pink wound bed. (Active)   Wound Etiology Skin Tear 03/10/21 1118   Dressing Status New dressing applied 03/10/21 1118   Cleansed Wound cleanser 03/10/21 1118   Dressing/Treatment Xeroform; Foam 03/10/21 1118   Dressing Change Due 03/12/21 03/10/21 1118   Wound Length (cm) 2.5 cm 03/10/21 1118   Wound Width (cm) 4 cm 03/10/21 1118   Wound Depth (cm) 0.1 cm 03/10/21 1118   Wound Surface Area (cm^2) 10 cm^2 03/10/21 1118   Wound Volume (cm^3) 1 cm^3 03/10/21 1118   Wound Assessment Pink/red;Superficial;Other (Comment) 03/10/21 1118   Drainage Amount Scant 03/10/21 1118   Drainage Description Clear 03/10/21 1118   Wound Odor None 03/10/21 1118   Chantal-Wound/Incision Assessment Intact 03/10/21 1118   Wound Thickness Description Partial thickness 03/10/21 1118       Incision 03/07/21 Hip Left (Active)   Dressing Status Clean;Dry; Intact 03/10/21 0800   Dressing/Treatment Foam 03/10/21 0800   Closure Staples 03/10/21 0800   Drainage Amount Small 03/09/21 1940   Drainage Description Sanguineous 03/09/21 1940   Wound Odor None 03/09/21 1940       Treatment today: Heel floated with the pillows to protect the heels. The skin tear was cleansed with wound cleanser and wiped with gauze. Xeroform gauze applied and a foam dressing.  Dated for today (3-10-21). Plan: Dressing change will be needed every 3 days. Discussed with nursing.    Nya Correa RN, BSN, [employer]

## 2021-03-10 NOTE — PROGRESS NOTES
Transition of Care Plan:  Disposition: 1st choice SNF at [facility]. 2nd choice daughter's home with her 24/7 caregiver and [facility]  Follow up appointments: ortho  DME needed: if patient d/c to daughter's home will need wheelchair and bedside commode  Transportation at [address] or means to access home: daughter to provide  IM Medicare letter: to be provided prior to d/c  Caregiver Contact: daughter Rosan Bence [phone] or [phone]  Discharge Caregiver contacted prior to discharge?  yes     Update 4:05 PM  Pt's family has provided 2nd choice for SNF placement. CM sent referral to [address] [place] [address]. 11:41 am  CM contacted [facility] ([phone]) and spoke to Aleja Lal in admission. Aleja Lal stated she believes they received the referral but needs to double check. CM inquired about which COVID test (Rapid vs PCR)  they will accept and they were unsure about that and would need to speak to the team. CM asked about bed availability and Aleja Lukasz reported that they are a [address] and have to see if they can accommodate pt's level of care and needs. CM provided them with a direct callback number to follow up with.     ALLISON Ramriez  Care Manager [facility]  [phone]

## 2021-03-10 NOTE — PROGRESS NOTES
Comprehensive Nutrition Assessment    Type and Reason for Visit: Initial, Consult    Nutrition Recommendations/Plan:   · Continue regular diet   · RD to add Ensure TID s/p surgery  · Please document % meals and supplements consumed in flowsheet I/O's under intake     Nutrition Assessment:      Consult received for ONS. Chart reviewed. Pt noted for hip fracture. Hx of osteoporosis, DVT, SUMAN, UTI, HTN, anemia, hypokalemia, depression, dementia. S/p hip surgery 3/7. Pt pleasantly confused at time of visit. She reports a fair appetite but c/o pain. Poor historian d/t dementia. Pt agreeable to Ensure supplements s/p surgery. Recent BM per flow sheet. MST negative for malnutrition risk factors PTA. Patient Vitals for the past 72 hrs:   % Diet Eaten   03/09/21 0935 50 %     Wt Readings from Last 5 Encounters:   03/07/21 65.8 kg (145 lb 1 oz)   03/06/21 65.8 kg (145 lb)   01/27/21 65.8 kg (145 lb)   10/26/20 64.4 kg (142 lb)   02/10/20 60.8 kg (134 lb)   ]    Estimated Daily Nutrient Needs:  Energy (kcal): 1460 kcal (BMR x 1. 3AF); Weight Used for Energy Requirements: Current  Protein (g): 66-79g (1-1.2g/kg); Weight Used for Protein Requirements: Current  Fluid (ml/day): 1460 mL; Method Used for Fluid Requirements: 1 ml/kcal      Nutrition Related Findings:  Labs: K 3.2. Meds: D3, miralax, pericolace, vancomycin, levaquin. BM 3/9. Trace edema LLE.       Wounds:    Surgical incision       Current Nutrition Therapies:  DIET REGULAR    Anthropometric Measures:  · Height:  5' 7\" (170.2 cm)  · Current Body Wt:  65.8 kg (145 lb 1 oz)   · Ideal Body Wt:  135 lbs:  107.5 %    · BMI Category:  Normal weight (BMI 22.0-24.9) age over 72       Nutrition Diagnosis:   · Increased nutrient needs related to (wound healing) as evidenced by (s/p hip surgery)      Nutrition Interventions:   Food and/or Nutrient Delivery: Continue current diet, Start oral nutrition supplement  Nutrition Education and Counseling: No recommendations at this time  Coordination of Nutrition Care: Continue to monitor while inpatient    Goals:  PO intake >50% meals and ONS next 4-6 days       Nutrition Monitoring and Evaluation:   Behavioral-Environmental Outcomes: None identified  Food/Nutrient Intake Outcomes: Food and nutrient intake, Supplement intake  Physical Signs/Symptoms Outcomes: Biochemical data, Skin, Weight, GI status, Fluid status or edema    Discharge Planning:    Continue current diet, Continue oral nutrition supplement     Electronically signed by Muriel Pop RD on 3/10/2021 at 11:08 AM    Contact: [id number]  Pager [phone]

## 2021-03-10 NOTE — PROGRESS NOTES
Hospitalist Progress Note    NAME: Matheus Gallardo   :  1932   MRN:  [id number]       Assessment / Plan:  Comminuted intertrochanteric fracture of the left femur s/p cephalomedullary nailing   Osteoporosis  - Transferred from [facility] for management of  left hip fracture. S/p GLF. ORIF 3/7 Dr Lico Lomeli   DVT prophylaxis: lovenox   Pain management:  Scheduled tylenol + norco prn , effective   PT: SNF   Hb: Admission Hb 12.1, down to 9.8, 8.3, 7.9   Check iron, b12 ( historically low)   Grace: out and voiding   BM: on bowel regiment    Acute LLE DVT   + Left calf TTP: duplex US with acute DVT   Acute occlusive thrombus present in the left distal femoral vein. Acute occlusive thrombus present in the left popliteal vein. -started therapeutic lovenox , if hb stabl, will transition to eliquis     Acute blood loss anemia  Admission Hb 12.1, down to 9.8, 8.3, 7.9   Check iron, b12 ( historically low)   Monitor closely on therapeutic lovenox now     Acute kidney injury, resolved  - Likely prerenal but also questioning if she had underlying ckd 3 ( gfr in the past on 2 occasions 42)   - Continue with IV fluid for next 24 hours     Urinary tract infection poa   - UA suggestive of UTI  LVQ started on admission, completed 3 doses but UC with staphh epi   Rx with vanco IV x 5 days ( can be completed via peripheral line at SNF)     Hypertension  - BP remain high side   On hctz at home, I would avoid use of diuretics in this elderly frail pt to avoid dehydration   Dc hctz, start low dose norvasc, adjust a needed      Hypokalemia, replaced as needed   If K remain low side, may need daily supplement   Fu Mg in am     Depression  Dementia  -wo behavioral issues , MS stable   - Continue with Zoloft  - Continue with Namenda. Holding  Aricept while on levaquin to avoid QT prolonogation.     Vit B12 deficiency: continue     3/9: spoke with son over the phone; discussed uti and dvt Rx; family would be agreeable for pt to go to the SNF in [place] if the one at [address] is not accepting     Code Status: Full code   DVT Prophylaxis: SCDs for now     Baseline: Use a walker  Disposition: SNF      Subjective:     Chief Complaint / Reason for Physician Visit: fu uti, dvt. Hip fracture   In bed comfortable and confused   Denies pain   Very poor historian   Denies dysuria but appears with some tenderness on palpation lower abdomen      Discussed with RN events overnight. Review of Systems:  Symptom Y/N Comments  Symptom Y/N Comments   Fever/Chills    Chest Pain     Poor Appetite    Edema     Cough    Abdominal Pain     Sputum    Joint Pain     SOB/PLUMMER    Pruritis/Rash     Nausea/vomit    Tolerating PT/OT     Diarrhea    Tolerating Diet     Constipation    Other       Could NOT obtain due to: Dementia      Objective:     VITALS:   Last 24hrs VS reviewed since prior progress note. Most recent are:  Patient Vitals for the past 24 hrs:   Temp Pulse Resp BP SpO2   03/10/21 0736 97.4 °F (36.3 °C) 70 16 (!) 160/69 98 %   03/10/21 0300 98.7 °F (37.1 °C) 73 18 (!) 146/74 94 %   03/09/21 2324 98.4 °F (36.9 °C) 74 18 (!) 148/52 92 %   03/09/21 1943 98.5 °F (36.9 °C) 68 18 (!) 140/50 99 %   03/09/21 1639 97.4 °F (36.3 °C) 69 18 (!) 187/65 98 %       Intake/Output Summary (Last 24 hours) at 3/10/2021 1151  Last data filed at 3/9/2021 2325  Gross per 24 hour   Intake --   Output 870 ml   Net -870 ml        I had a face to face encounter and independently examined this patient on 3/10/2021, as outlined below:  PHYSICAL EXAM:  General: WD, WN. Alert, cooperative, no acute distress    EENT:  EOMI. Anicteric sclerae. MMM  Resp:  CTA bilaterally, no wheezing or rales. No accessory muscle use  CV:  Regular  rhythm,  No edema  GI:  Soft, Non distended, Non tender. +Bowel sounds  Neurologic:  Alert and oriented X 1-2, normal speech,   Psych:   poor insight. Not anxious nor agitated  Skin:  No rashes.   No jaundice    Reviewed most current lab test results and cultures  YES  Reviewed most current radiology test results   YES  Review and summation of old records today    NO  Reviewed patient's current orders and MAR    YES  PMH/SH reviewed - no change compared to H&P  ________________________________________________________________________  Care Plan discussed with:    Comments   Patient x    Family      RN x    Care Manager x    Consultant  x Ortho                     x Multidiciplinary team rounds were held today with , nursing, pharmacist and clinical coordinator. Patient's plan of care was discussed; medications were reviewed and discharge planning was addressed. ________________________________________________________________________      Total CRITICAL CARE TIME Spent: 35  Minutes non procedure based      Comments   >50% of visit spent in counseling and coordination of care x    ________________________________________________________________________  Gianfranco Awad MD     Procedures: see electronic medical records for all procedures/Xrays and details which were not copied into this note but were reviewed prior to creation of Plan. LABS:  I reviewed today's most current labs and imaging studies.   Pertinent labs include:  Recent Labs     03/10/21  0351 03/09/21  0517 03/08/21  0103   WBC 8.7  --  9.8   HGB 7.9* 8.3* 8.9*   HCT 23.8*  --  26.4*     --  159     Recent Labs     03/10/21  0351 03/08/21  0103    140   K 3.2* 3.5   * 113*   CO2 21 22   * 142*   BUN 9 17   CREA 0.66 0.76   CA 7.9* 8.1*       Signed: Gianfranco Awad MD

## 2021-03-10 NOTE — PROGRESS NOTES
Problem: Self Care Deficits Care Plan (Adult)  Goal: *Acute Goals and Plan of Care (Insert Text)  Description:   FUNCTIONAL STATUS PRIOR TO ADMISSION: Pt confused this date, providing minimal PLOF details. States she mobilizes using a RW. Gets in tub to bathe. Initially stated she dresses herself, then later stated \"It's hard to get my arms over my head. \" Per chart, Pt sustained at least x3 falls PTA. HOME SUPPORT: Lives alone with either family or caregivers providing assist with IADLs. Occupational Therapy Goals  Initiated 3/8/2021  1. Patient will perform grooming tasks seated with set-up assist within 7 day(s). 2.  Patient will perform upper body dressing with set-up assist within 7 day(s). 3.  Patient will perform lower body dressing with moderate assistance within 7 day(s). 4.  Patient will perform BSC/toilet transfers with minimal assistance using least restrictive device within 7 day(s). 5.  Patient will perform all aspects of toileting with minimal assistance within 7 day(s). Outcome: Not Progressing Towards Goal   OCCUPATIONAL THERAPY TREATMENT  Patient: Shanthi Hedrick (50 y.o. female)  Date: 3/10/2021  Diagnosis: Hip fracture ([address] [place] <principal problem not specified>  Procedure(s) (LRB):  LEFT FEMUR INSERTION INTRA MEDULLARY NAIL-URGENT (Left) 3 Days Post-Op  Precautions: WBAT, Fall  Chart, occupational therapy assessment, plan of care, and goals were reviewed. ASSESSMENT  Patient continues with skilled OT services and is not progressing towards goals. Pt was seated upon arrival.  Pt is not oriented to place, situation, time and required multimodal cues--she insisted that she is in [place] (her town). Pt demonstrates decreased command following, poor memory, and distractibility requiring multimodal cues to complete grooming tasks requiring significant additional time. Pt's cognitive status at baseline is unknown. Pt is unable to report her PLOF at this time. Pt will need 24 hour assistance at discharge. Current Level of Function Impacting Discharge (ADLs): supervision to minimal assistance for grooming tasks; pt is confused and disoriented    Other factors to consider for discharge: decreased command following and poor memory impair safety         PLAN :  Patient continues to benefit from skilled intervention to address the above impairments. Continue treatment per established plan of care. to address goals. Recommend with staff: encourage upright in chair for meals and throughout the day. Recommendation for discharge: (in order for the patient to meet his/her long term goals)  Therapy up to 5 days/week in SNF setting vs. Home with 24 hour direct assistance    This discharge recommendation:  Has not yet been discussed the attending provider and/or case management    IF patient discharges home will need the following DME: tbd       SUBJECTIVE:   Patient stated  I am in [place] . Ale[place] Padilla ...  this is not [place].     OBJECTIVE DATA SUMMARY:   Cognitive/Behavioral Status:  Neurologic State: Alert;Confused  Orientation Level: Disoriented to place; Disoriented to situation;Disoriented to time;Oriented to person  Cognition: Decreased attention/concentration;Decreased command following; Impaired decision making;Memory loss  Perception: Appears intact  Perseveration: No perseveration noted(lacks insight )  Safety/Judgement: Decreased awareness of environment;Decreased awareness of need for assistance;Decreased awareness of need for safety;Decreased insight into deficits; Lack of insight into deficits    Balance:  Sitting: Intact; With support  Sitting - Static: Prop sitting;Supported sitting  Sitting - Dynamic: Not tested  ADL Intervention:      Pt declined placement of her ice pack on her L hip. She states that she is in the hospital due to a \"broken ankle\". She does not believe she's had a hip fracture and surgery.       Grooming  Grooming Assistance: Set-up; Minimum assistance  Position Performed: Seated in chair  Washing Face: Supervision;Stand-by assistance(multiple verbal cues to initiate task.  )  Brushing/Combing Hair: Supervision;Minimum assistance(3 verbal cues to initiate task.)  Cues: Physical assistance; Tactile cues provided;Verbal cues provided;Visual cues provided              Upper Body Dressing Assistance  Dressing Assistance: (pt declined task)              Cognitive Retraining  Orientation Retraining: Reorienting(place situation--multiple cues, but no memory of each)  Attention to Task: Distractibility  Following Commands: (decreased command following requiring multiple cues)  Safety/Judgement: Decreased awareness of environment;Decreased awareness of need for assistance;Decreased awareness of need for safety;Decreased insight into deficits; Lack of insight into deficits  Cues: Tactile cues provided;Verbal cues provided;Visual cues provided    Therapeutic Exercises:   Decreased command following limit participation. Pain:  Pt is unable to report    Activity Tolerance:   Fair  Decreased cognition and orientation limit participation    After treatment patient left in no apparent distress:   Sitting in chair, Call bell within reach, Bed / chair alarm activated    COMMUNICATION/COLLABORATION:   The patients plan of care was discussed with: Physical therapy assistant.      ZAKIA Larios/L  Time Calculation: 14 mins

## 2021-03-10 NOTE — PROGRESS NOTES
ORTHO - Progress Note  Post Op day: 3 Days [address]     [id number]  female    80 y.o.    1932    Admit date:3/6/2021  Date of Surgery:3/7/2021   Procedures:Procedure(s):LEFT FEMUR INSERTION INTRA MEDULLARY NAIL-URGENT  Surgeon:Surgeon(s) and Role:   Monique Corona, DO - Primary        SUBJECTIVE:     Jake Rosario is a 80 y.o. female resting in the bed  Patient has complaints of left hip discomfort when she moves. OBJECTIVE:       Physical Exam:  General: alert, cooperative, no distress. Asking about getting out of bed. Baseline confusion/dementia  Gastrointestinal:  non-distended . Cardiovascular: equal pulses in the lower extremities,  Brisk cap refill in all distal extremities   Genitourinary: Voiding independently - depends  Respiratory: No respiratory distress   Neurological:Neurovascular exam within normal limits. Senstion intact: LE bilat. Motor: + DF/PF/EHL. Musculoskeletal:   Left Calf tender upon palpation . Dressing/Wound:  Trace drainage, dry and intact. No significant erythema or swelling.     Vital Signs:         Patient Vitals for the past 8 hrs:   BP Temp Pulse Resp SpO2   03/10/21 0736 (!) 160/69 97.4 °F (36.3 °C) 70 16 98 %   03/10/21 0300 (!) 146/74 98.7 °F (37.1 °C) 73 18 94 %                                          Temp (24hrs), Av °F (36.7 °C), Min:97.4 °F (36.3 °C), Max:98.7 °F (37.1 °C)      Labs:        Recent Labs     03/10/21  0351   HCT 23.8*   HGB 7.9*     PT/OT:              ASSESSMENT / PLAN:   Active Problems:    Hip fracture ([address] (3/6/2021)       -  Avoid narcotics if possible to to dementia   -  Continue PT/OT WBAT  -  Acute DVT seen on doppler yesterday 3/9 treatment dose Lovenox-  medicine to transition to eilquis if Hb remains stable  -  DC planning - SNF v/s home with [facility]    Signed By: Fifi Contreras PA-C

## 2021-03-10 NOTE — PROGRESS NOTES
Problem: Mobility Impaired (Adult and Pediatric)  Goal: *Acute Goals and Plan of Care (Insert Text)  Description: FUNCTIONAL STATUS PRIOR TO ADMISSION: Patient was independent and active without use of DME.    HOME SUPPORT PRIOR TO ADMISSION: The patient lived alone with family and possibly caregivers to provide assistance. Physical Therapy Goals  Initiated 3/8/2021  1. Patient will move from supine to sit and sit to supine  in bed with moderate assistance  within 7 day(s). 2.  Patient will transfer from bed to chair and chair to bed with moderate assistance  using the least restrictive device within 7 day(s). 3.  Patient will perform sit to stand with moderate assistance  within 7 day(s). 4.  Patient will ambulate with moderate assistance  for 10 feet with the least restrictive device within 7 day(s). Outcome: Progressing Towards Goal   PHYSICAL THERAPY TREATMENT  Patient: Cira Green (78 y.o. female)  Date: 3/10/2021    Diagnosis: Hip fracture ([facility] [address] [S72.009A]     Procedure(s) (LRB): LEFT FEMUR INSERTION INTRA MEDULLARY NAIL-URGENT (Left) 3 Days Post-Op    Precautions: WBAT, Fall    Chart, physical therapy assessment, plan of care and goals were reviewed. ASSESSMENT: Patient continues with skilled PT services and is progressing towards goals, pt does fair with gait and transfers but fatigues quickly, no SOB, needs a lot of assist with bed mob, has some confusion, max vc's for safety and proper RW use. Current Level of Function Impacting Discharge (mobility/balance): Other factors to consider for discharge: confusion         PLAN : Patient continues to benefit from skilled intervention to address the above impairments. Continue treatment per established plan of care  to address goals.     Recommendation for discharge: (in order for the patient to meet his/her long term goals) Therapy up to 5 days/week in SNF setting    This discharge recommendation: Has been made in collaboration with the attending provider and/or case management    IF patient discharges home will need the following DME: bedside commode and rolling walker     OBJECTIVE DATA SUMMARY:     Critical Behavior:  Neurologic State: Alert, Confused  Orientation Level: Oriented to person  Cognition: Follows commands     Functional Mobility Training:  Bed Mobility:  Rolling: Maximum assistance;Assist x2; Additional time  Supine to Sit: Maximum assistance;Assist x2; Additional time  Scooting: Maximum assistance;Assist x2; Additional time  Level of Assistance: Maximum assistance  Interventions: Tactile cues; Verbal cues;Manual cues    Transfers:  Sit to Stand: Minimum assistance;Assist x2; Additional time  Stand to Sit: Minimum assistance;Assist x1  Bed to Chair: Minimum assistance;Assist x2; Additional time  Interventions: Tactile cues; Verbal cues  Level of Assistance: Minimum assistance;Assist x2; Additional time    Balance:  Sitting: Intact; With support  Sitting - Static: Prop sitting;Supported sitting  Sitting - Dynamic: Not tested  Standing: Impaired; With support  Standing - Static: Fair;Constant support  Standing - Dynamic : Fair;Constant support    Ambulation/Gait Training:  Distance (ft): 5 Feet (ft)  Assistive Device: Gait belt;Walker, rolling  Ambulation - Level of Assistance: Minimal assistance;Assist x2; Additional time  Gait Abnormalities: Antalgic;Decreased step clearance  Right Side Weight Bearing: Full  Left Side Weight Bearing: As tolerated  Base of Support: Shift to right;Narrowed  Stance: Left decreased  Speed/Iza: Slow  Step Length: Left shortened;Right shortened    Therapeutic Exercises:   sitting  EXERCISE   Sets   Reps   Active Active Assist   Passive   Comments   Ankle pumps 1 10 [x] [] [] bilat   Heel raises 1 10 [x] [] [] \"   Toe tap 1 10 [x] [] [] \"   Knee ext 1 10 [x] [] [] \"   Hip flex 1 10 [x] [] [] \"     Pain Rating: did not rate    Activity Tolerance: Poor    After treatment patient left in no apparent distress: Sitting in chair, Call bell within reach, and Bed / chair alarm activated    COMMUNICATION/COLLABORATION:   The patients plan of care was discussed with: Registered nurse.      Bladimir Galdamez PTA   Time Calculation: 30 mins

## 2021-03-11 LAB
ANION GAP SERPL CALC-SCNC: 6 MMOL/L (ref 5–15)
BUN SERPL-MCNC: 12 MG/DL (ref 6–20)
BUN/CREAT SERPL: 19 (ref 12–20)
CALCIUM SERPL-MCNC: 8.3 MG/DL (ref 8.5–10.1)
CHLORIDE SERPL-SCNC: 112 MMOL/L (ref 97–108)
CO2 SERPL-SCNC: 22 MMOL/L (ref 21–32)
CREAT SERPL-MCNC: 0.64 MG/DL (ref 0.55–1.02)
DATE LAST DOSE: ABNORMAL
ERYTHROCYTE [DISTWIDTH] IN BLOOD BY AUTOMATED COUNT: 13 % (ref 11.5–14.5)
GLUCOSE SERPL-MCNC: 112 MG/DL (ref 65–100)
HCT VFR BLD AUTO: 22.7 % (ref 35–47)
HGB BLD-MCNC: 7.6 G/DL (ref 11.5–16)
MAGNESIUM SERPL-MCNC: 2 MG/DL (ref 1.6–2.4)
MCH RBC QN AUTO: 31.1 PG (ref 26–34)
MCHC RBC AUTO-ENTMCNC: 33.5 G/DL (ref 30–36.5)
MCV RBC AUTO: 93 FL (ref 80–99)
NRBC # BLD: 0.02 K/UL (ref 0–0.01)
NRBC BLD-RTO: 0.2 PER 100 WBC
PHOSPHATE SERPL-MCNC: 2.8 MG/DL (ref 2.6–4.7)
PLATELET # BLD AUTO: 225 K/UL (ref 150–400)
PMV BLD AUTO: 10 FL (ref 8.9–12.9)
POTASSIUM SERPL-SCNC: 3.4 MMOL/L (ref 3.5–5.1)
RBC # BLD AUTO: 2.44 M/UL (ref 3.8–5.2)
REPORTED DOSE,DOSE: ABNORMAL UNITS
REPORTED DOSE/TIME,TMG: ABNORMAL
SARS-COV-2, XPLCVT: NOT DETECTED
SODIUM SERPL-SCNC: 140 MMOL/L (ref 136–145)
SOURCE, COVRS: NORMAL
VANCOMYCIN TROUGH SERPL-MCNC: 13.5 UG/ML (ref 5–10)
WBC # BLD AUTO: 8.7 K/UL (ref 3.6–11)

## 2021-03-11 PROCEDURE — 74011250637 HC RX REV CODE- 250/637: Performed by: PHYSICIAN ASSISTANT

## 2021-03-11 PROCEDURE — 74011250636 HC RX REV CODE- 250/636: Performed by: HOSPITALIST

## 2021-03-11 PROCEDURE — 74011250637 HC RX REV CODE- 250/637: Performed by: ORTHOPAEDIC SURGERY

## 2021-03-11 PROCEDURE — 80202 ASSAY OF VANCOMYCIN: CPT

## 2021-03-11 PROCEDURE — 74011250637 HC RX REV CODE- 250/637: Performed by: INTERNAL MEDICINE

## 2021-03-11 PROCEDURE — 85027 COMPLETE CBC AUTOMATED: CPT

## 2021-03-11 PROCEDURE — 83540 ASSAY OF IRON: CPT

## 2021-03-11 PROCEDURE — 83735 ASSAY OF MAGNESIUM: CPT

## 2021-03-11 PROCEDURE — 94760 N-INVAS EAR/PLS OXIMETRY 1: CPT

## 2021-03-11 PROCEDURE — 82607 VITAMIN B-12: CPT

## 2021-03-11 PROCEDURE — 74011250637 HC RX REV CODE- 250/637: Performed by: HOSPITALIST

## 2021-03-11 PROCEDURE — 36415 COLL VENOUS BLD VENIPUNCTURE: CPT

## 2021-03-11 PROCEDURE — 80048 BASIC METABOLIC PNL TOTAL CA: CPT

## 2021-03-11 PROCEDURE — 77030038269 HC DRN EXT URIN PURWCK BARD -A

## 2021-03-11 PROCEDURE — 65660000000 HC RM CCU STEPDOWN

## 2021-03-11 PROCEDURE — 84100 ASSAY OF PHOSPHORUS: CPT

## 2021-03-11 RX ORDER — AMLODIPINE BESYLATE 2.5 MG/1
2.5 TABLET ORAL
Status: COMPLETED | OUTPATIENT
Start: 2021-03-11 | End: 2021-03-11

## 2021-03-11 RX ORDER — POTASSIUM CHLORIDE 750 MG/1
20 TABLET, FILM COATED, EXTENDED RELEASE ORAL 2 TIMES DAILY
Status: DISCONTINUED | OUTPATIENT
Start: 2021-03-12 | End: 2021-03-12 | Stop reason: HOSPADM

## 2021-03-11 RX ORDER — POTASSIUM CHLORIDE 750 MG/1
40 TABLET, FILM COATED, EXTENDED RELEASE ORAL
Status: COMPLETED | OUTPATIENT
Start: 2021-03-11 | End: 2021-03-11

## 2021-03-11 RX ORDER — AMLODIPINE BESYLATE 5 MG/1
5 TABLET ORAL DAILY
Status: DISCONTINUED | OUTPATIENT
Start: 2021-03-12 | End: 2021-03-12 | Stop reason: HOSPADM

## 2021-03-11 RX ADMIN — SERTRALINE HYDROCHLORIDE 100 MG: 50 TABLET ORAL at 09:35

## 2021-03-11 RX ADMIN — CASTOR OIL AND BALSAM, PERU: 788; 87 OINTMENT TOPICAL at 09:35

## 2021-03-11 RX ADMIN — ACETAMINOPHEN 500 MG: 500 TABLET ORAL at 05:49

## 2021-03-11 RX ADMIN — ENOXAPARIN SODIUM 70 MG: 80 INJECTION, SOLUTION INTRAVENOUS; SUBCUTANEOUS at 17:52

## 2021-03-11 RX ADMIN — Medication 10 ML: at 05:54

## 2021-03-11 RX ADMIN — Medication 2000 UNITS: at 09:40

## 2021-03-11 RX ADMIN — POLYETHYLENE GLYCOL 3350 17 G: 17 POWDER, FOR SOLUTION ORAL at 09:35

## 2021-03-11 RX ADMIN — DOCUSATE SODIUM 50MG AND SENNOSIDES 8.6MG 1 TABLET: 8.6; 5 TABLET, FILM COATED ORAL at 09:35

## 2021-03-11 RX ADMIN — DONEPEZIL HYDROCHLORIDE 5 MG: 5 TABLET, FILM COATED ORAL at 17:52

## 2021-03-11 RX ADMIN — ACETAMINOPHEN 500 MG: 500 TABLET ORAL at 00:43

## 2021-03-11 RX ADMIN — ENOXAPARIN SODIUM 70 MG: 80 INJECTION, SOLUTION INTRAVENOUS; SUBCUTANEOUS at 05:49

## 2021-03-11 RX ADMIN — AMLODIPINE BESYLATE 2.5 MG: 2.5 TABLET ORAL at 11:42

## 2021-03-11 RX ADMIN — VANCOMYCIN HYDROCHLORIDE 750 MG: 750 INJECTION, POWDER, LYOPHILIZED, FOR SOLUTION INTRAVENOUS at 05:49

## 2021-03-11 RX ADMIN — ACETAMINOPHEN 500 MG: 500 TABLET ORAL at 11:43

## 2021-03-11 RX ADMIN — MEMANTINE HYDROCHLORIDE 10 MG: 10 TABLET ORAL at 09:34

## 2021-03-11 RX ADMIN — Medication 1 AMPULE: at 09:35

## 2021-03-11 RX ADMIN — CASTOR OIL AND BALSAM, PERU: 788; 87 OINTMENT TOPICAL at 17:52

## 2021-03-11 RX ADMIN — ACETAMINOPHEN 500 MG: 500 TABLET ORAL at 17:52

## 2021-03-11 RX ADMIN — VANCOMYCIN HYDROCHLORIDE 750 MG: 750 INJECTION, POWDER, LYOPHILIZED, FOR SOLUTION INTRAVENOUS at 18:38

## 2021-03-11 RX ADMIN — Medication 10 ML: at 15:53

## 2021-03-11 RX ADMIN — POTASSIUM CHLORIDE 40 MEQ: 750 TABLET, FILM COATED, EXTENDED RELEASE ORAL at 11:42

## 2021-03-11 RX ADMIN — AMLODIPINE BESYLATE 2.5 MG: 2.5 TABLET ORAL at 09:34

## 2021-03-11 RX ADMIN — ASPIRIN 81 MG: 81 TABLET, COATED ORAL at 09:34

## 2021-03-11 NOTE — PROGRESS NOTES
Transition of Care Plan:  [id number] [facility]  Follow up appointments: ortho, PCP  DME needed: n/a  Transportation at [address] on will call   [place] or means to access home: n/a  IM Medicare letter: to be provided prior to d/c  Caregiver Contact: daughter Rosan Bence [phone] or [phone]  Discharge Caregiver contacted prior to discharge?  yes     CAPRICE spoke with Francis Ny at [facility] ([phone]) who reported they can accept patient for SNF and have a bed for her tomorrow. CAPRICE informed DON that patient will need IV vanc q12h until Sunday morning at 6:00am. DON contacted pharmacy who is unable to provide patient with IV vanc as they are unable to mix it up because they dont have a \"mahmood\" right now in their lab. [facility] denied being able to accept patient over the weekend. CAPRICE has notified attending who reported that patient will have to wait to d/c on Monday as she needs to complete her vanc. CAPRICE has notified DON and she confirmed they can accept patient on Monday and denied patient needing a new COVID test before then. CAPRICE Nicole had already contacted patient's daughter, Caldwell Buerger, to inform her of d/c prior to being informed the facility is unable to provide vanc at this time. CAPRICE Nicole has since contacted family again to inform them of the delay in d/c.     Plan for d/c Monday.      Bobbe Gowers, [address], [address]

## 2021-03-11 NOTE — PROGRESS NOTES
Attempted to see pt for PT tx. Pt refusing multiple times and stating she doesn't feel well. Will defer and continue to follow.

## 2021-03-11 NOTE — PROGRESS NOTES
End of Shift Note    Bedside shift change report given to Nichole Knowles RN (oncoming nurse) by Gail Garcia (offgoing nurse). Report included the following information SBAR, Kardex, OR Summary, Intake/Output, MAR and Recent Results    Shift worked:  Day     Shift summary and any significant changes:     Patient confused. Moving with two assist back from chair after PT. Lovenox given for DVT, SCD only on right leg. Wound care saw patient and dressing on back to be changed every three days. Covid test sent for placement     Concerns for physician to address:  see above     Zone phone for oncoming shift:   [phone]       Activity:  Activity Level: Up with Assistance  Number times ambulated in hallways past shift: 0  Number of times OOB to chair past shift: 1    Cardiac:   Cardiac Monitoring: No      Cardiac Rhythm: Normal sinus rhythm    Access:   Current line(s): PIV     Genitourinary:   Urinary status: voiding    Respiratory:   O2 Device: Room air  Chronic home O2 use?: NO  Incentive spirometer at bedside: NO     GI:  Last Bowel Movement Date: 03/09/21  Current diet:  DIET REGULAR  DIET NUTRITIONAL SUPPLEMENTS Breakfast, Lunch, Dinner; Ensure [employer]  Passing flatus: YES  Tolerating current diet: YES  % Diet Eaten: 50 %    Pain Management:   Patient states pain is manageable on current regimen: YES    Skin:  Ryne Score: 14  Interventions: foam dressing, PT/OT consult and internal/external urinary devices    Patient Safety:  Fall Score:  Total Score: 5  Interventions: bed/chair alarm, assistive device (walker, cane, etc), gripper socks and pt to call before getting OOB  High Fall Risk: Yes    Length of Stay:  Expected LOS: 4d 7h  Actual LOS: [facility]

## 2021-03-11 NOTE — PROGRESS NOTES
Spiritual Care Assessment/Progress Note  [facility]      NAME: Rosalee Samuels      MRN: [id number]  AGE: 80 y.o.  SEX: female  Orthodoxy Affiliation: Protestant   Language: English     3/11/2021     Total Time (in minutes): 10     Spiritual Assessment begun in [facility] through conversation with:         [x]Patient        [] Family    [] Friend(s)        Reason for Consult: Initial/Spiritual assessment, patient floor     Spiritual beliefs: (Please include comment if needed)     [x] Identifies with a traci tradition:         [] Supported by a traci community:            [] Claims no spiritual orientation:           [] Seeking spiritual identity:                [] Adheres to an individual form of spirituality:           [] Not able to assess:                           Identified resources for coping:      [x] Prayer                               [] Music                  [] Guided Imagery     [x] Family/friends                 [] Pet visits     [] Devotional reading                         [] Unknown     [] Other:                                               Interventions offered during this visit: (See comments for more details)    Patient Interventions: Affirmation of emotions/emotional suffering, Affirmation of traci, Coping skills reviewed/reinforced, Initial/Spiritual assessment, patient floor, Normalization of emotional/spiritual concerns, Prayer (assurance of)           Plan of Care:     [] Support spiritual and/or cultural needs    [] Support AMD and/or advance care planning process      [] Support grieving process   [] Coordinate Rites and/or Rituals    [] Coordination with community clergy   [] No spiritual needs identified at this time   [] Detailed Plan of Care below (See Comments)  [] Make referral to Music Therapy  [] Make referral to Pet Therapy     [] Make referral to Addiction services  [] Make referral to [facility]  [] Make referral to Spiritual Care Partner  [] No future visits requested        [x] Follow up upon further referrals     Comments: Provided initial spiritual assessment for pt Tanvir on ORTHO. No family/friends present. Affirmed that pt was feeling better and that family support is strong. Processed uncertainty of the whereabouts of her phone. Pt expressed concern that family know that she is alright. Consult with staff. Advised of  services.     HITESH Recinos 1 Provider   Paging Service [phone])

## 2021-03-11 NOTE — PROGRESS NOTES
Po hip fracture. Tires easy with pt  Pain managed  hgb stable 7.6    Patient Vitals for the past 24 hrs:   Temp Pulse Resp BP SpO2   03/11/21 0752 98.8 °F (37.1 °C) 86 20 (!) 181/68 97 %   03/11/21 0400 -- -- -- (!) 158/75 --   03/10/21 2321 99.2 °F (37.3 °C) 76 18 (!) 171/88 93 %   03/10/21 2009 98.6 °F (37 °C) 77 18 (!) 163/70 98 %   03/10/21 1519 98.2 °F (36.8 °C) 72 18 (!) 129/55 100 %     Dressing clean and dry  Musculoskeletal: Sumaya's sign negative in bilateral lower extremities. Calves soft, supple, non-tender upon palpation or with passive stretch. SSMENT: Patient continues with skilled PT services and is progressing towards goals, pt does fair with gait and transfers but fatigues quickly, no SOB, needs a lot of assist with bed mob, has some confusion, max vc's for safety and proper RW use.     Current Level of Function Impacting Discharge (mobility/balance):      Other factors to consider for discharge: confusion          PLAN : Patient continues to benefit from skilled intervention to address the above impairments.   Continue treatment per established plan of care  to address goals.     Recommendation for discharge: (in order for the patient to meet his/her long term goals) Therapy up to 5 days/week in SNF setting     This discharge recommendation: Has been made in collaboration with the attending provider and/or case management     IF patient discharges home will need the following DME: bedside commode and rolling walker     -  Avoid narcotics if possible to to dementia   -  Continue PT/OT WBAT  -  Acute DVT seen on doppler yesterday 3/9 treatment dose Lovenox-  medicine to transition to eilquis if Hb remains stable  -  DC planning - SNF v/s home with [place]

## 2021-03-11 NOTE — PROGRESS NOTES
Hospitalist Progress Note    NAME: Jeannette Koyanagi   :  1932   MRN:  [id number]       Assessment / Plan:  Comminuted intertrochanteric fracture of the left femur s/p cephalomedullary nailing   Osteoporosis  - Transferred from [facility] for management of  left hip fracture. S/p GLF. ORIF 3/7 Dr Arsh Piper   DVT prophylaxis: lovenox   Pain management:  Scheduled tylenol + norco prn , effective   PT: SNF   Hb: Admission Hb 12.1, down to 9.8, 8.3, 7.9 , 7.6   FU  iron, b12 ( historically low)   Grace: out and voiding   BM: on bowel regiment    Acute LLE DVT   + Left calf TTP: duplex US with acute DVT   Acute occlusive thrombus present in the left distal femoral vein. Acute occlusive thrombus present in the left popliteal vein.   -started therapeutic lovenox , if hb stable, will transition to eliquis     Acute blood loss anemia  Admission Hb 12.1, down to 9.8, 8.3, 7.9 , 7.6   FU iron, b12 ( historically low)   Monitor closely on therapeutic lovenox now     Acute kidney injury, resolved  - Likely prerenal   S/p IVF, cr remain stable; no ckd noted at this time      Urinary tract infection poa   - UA suggestive of UTI  LVQ started on admission, completed 3 doses but UC with staphh epi   Rx with vanco IV x 5 days ( can be completed via peripheral line at SNF)     Hypertension  - BP remain high side   On hctz at home, I would avoid use of diuretics in this elderly frail pt to avoid dehydration   Dc hctz, started norvasc, adjust a needed      Hypokalemia, replaced as needed   If K remain low side, adding daily supplement   Mg/ PH is normal      Depression  Dementia  -wo behavioral issues , MS stable   - Continue with Zoloft, Namenda, Aricept    Vit B12 deficiency: continue supplement     3/9: spoke with son over the phone; discussed uti and dvt Rx; family would be agreeable for pt to go to the SNF in [place] if the one at [facility] is not accepting       Code Status: Full code   DVT Prophylaxis: SCDs for now     Baseline: Use a walker  Disposition: SNF once arrangement are done   covid pending      Subjective:     Chief Complaint / Reason for Physician Visit: fu uti, dvt. Hip fracture   Doing well, no new issues     Discussed with RN events overnight. Review of Systems:  Symptom Y/N Comments  Symptom Y/N Comments   Fever/Chills    Chest Pain     Poor Appetite    Edema     Cough    Abdominal Pain     Sputum    Joint Pain     SOB/PLUMMER    Pruritis/Rash     Nausea/vomit    Tolerating PT/OT     Diarrhea    Tolerating Diet     Constipation    Other       Could NOT obtain due to: Dementia      Objective:     VITALS:   Last 24hrs VS reviewed since prior progress note. Most recent are:  Patient Vitals for the past 24 hrs:   Temp Pulse Resp BP SpO2   03/11/21 0752 98.8 °F (37.1 °C) 86 20 (!) 181/68 97 %   03/11/21 0400 -- -- -- (!) 158/75 --   03/10/21 2321 99.2 °F (37.3 °C) 76 18 (!) 171/88 93 %   03/10/21 2009 98.6 °F (37 °C) 77 18 (!) 163/70 98 %   03/10/21 1519 98.2 °F (36.8 °C) 72 18 (!) 129/55 100 %       Intake/Output Summary (Last 24 hours) at 3/11/2021 1018  Last data filed at 3/10/2021 1656  Gross per 24 hour   Intake --   Output 650 ml   Net -650 ml        I had a face to face encounter and independently examined this patient on 3/11/2021, as outlined below:  PHYSICAL EXAM:  General: WD, WN. Alert, cooperative, no acute distress    EENT:  EOMI. Anicteric sclerae. MMM  Resp:  CTA bilaterally, no wheezing or rales. No accessory muscle use  CV:  Regular  rhythm,  No edema  GI:  Soft, Non distended, Non tender. +Bowel sounds  Neurologic:  Alert and oriented X 1-2, normal speech,   Psych:   poor insight. Not anxious nor agitated  Skin:  No rashes.   No jaundice    Reviewed most current lab test results and cultures  YES  Reviewed most current radiology test results   YES  Review and summation of old records today    NO  Reviewed patient's current orders and MAR    YES  PMH/SH reviewed - no change compared to H&P  ________________________________________________________________________  Care Plan discussed with:    Comments   Patient x    Family      RN x    Care Manager x    Consultant                        x Multidiciplinary team rounds were held today with , nursing, pharmacist and clinical coordinator. Patient's plan of care was discussed; medications were reviewed and discharge planning was addressed. ________________________________________________________________________      Total CRITICAL CARE TIME Spent: 35  Minutes non procedure based      Comments   >50% of visit spent in counseling and coordination of care x    ________________________________________________________________________  Beryle Busman, MD     Procedures: see electronic medical records for all procedures/Xrays and details which were not copied into this note but were reviewed prior to creation of Plan. LABS:  I reviewed today's most current labs and imaging studies.   Pertinent labs include:  Recent Labs     03/11/21  0359 03/10/21  0351 03/09/21  0517   WBC 8.7 8.7  --    HGB 7.6* 7.9* 8.3*   HCT 22.7* 23.8*  --     196  --      Recent Labs     03/11/21  0359 03/10/21  0351    141   K 3.4* 3.2*   * 112*   CO2 22 21   * 101*   BUN 12 9   CREA 0.64 0.66   CA 8.3* 7.9*   MG 2.0  --    PHOS 2.8  --        Signed: Beryle Busman, MD

## 2021-03-12 VITALS
SYSTOLIC BLOOD PRESSURE: 134 MMHG | HEIGHT: 67 IN | RESPIRATION RATE: 18 BRPM | WEIGHT: 145.72 LBS | HEART RATE: 74 BPM | OXYGEN SATURATION: 97 % | DIASTOLIC BLOOD PRESSURE: 54 MMHG | TEMPERATURE: 98.1 F | BODY MASS INDEX: 22.87 KG/M2

## 2021-03-12 LAB
ANION GAP SERPL CALC-SCNC: 6 MMOL/L (ref 5–15)
BUN SERPL-MCNC: 15 MG/DL (ref 6–20)
BUN/CREAT SERPL: 21 (ref 12–20)
CALCIUM SERPL-MCNC: 8.4 MG/DL (ref 8.5–10.1)
CHLORIDE SERPL-SCNC: 111 MMOL/L (ref 97–108)
CO2 SERPL-SCNC: 21 MMOL/L (ref 21–32)
CREAT SERPL-MCNC: 0.7 MG/DL (ref 0.55–1.02)
ERYTHROCYTE [DISTWIDTH] IN BLOOD BY AUTOMATED COUNT: 13.4 % (ref 11.5–14.5)
GLUCOSE SERPL-MCNC: 150 MG/DL (ref 65–100)
HCT VFR BLD AUTO: 22.5 % (ref 35–47)
HGB BLD-MCNC: 7.3 G/DL (ref 11.5–16)
IRON SATN MFR SERPL: 25 % (ref 20–50)
IRON SERPL-MCNC: 43 UG/DL (ref 35–150)
MCH RBC QN AUTO: 30.9 PG (ref 26–34)
MCHC RBC AUTO-ENTMCNC: 32.4 G/DL (ref 30–36.5)
MCV RBC AUTO: 95.3 FL (ref 80–99)
NRBC # BLD: 0.03 K/UL (ref 0–0.01)
NRBC BLD-RTO: 0.4 PER 100 WBC
PLATELET # BLD AUTO: 236 K/UL (ref 150–400)
PMV BLD AUTO: 9.8 FL (ref 8.9–12.9)
POTASSIUM SERPL-SCNC: 3.6 MMOL/L (ref 3.5–5.1)
RBC # BLD AUTO: 2.36 M/UL (ref 3.8–5.2)
SODIUM SERPL-SCNC: 138 MMOL/L (ref 136–145)
TIBC SERPL-MCNC: 174 UG/DL (ref 250–450)
VIT B12 SERPL-MCNC: 1024 PG/ML (ref 193–986)
WBC # BLD AUTO: 8.1 K/UL (ref 3.6–11)

## 2021-03-12 PROCEDURE — 77030038269 HC DRN EXT URIN PURWCK BARD -A

## 2021-03-12 PROCEDURE — 36415 COLL VENOUS BLD VENIPUNCTURE: CPT

## 2021-03-12 PROCEDURE — 74011250637 HC RX REV CODE- 250/637: Performed by: ORTHOPAEDIC SURGERY

## 2021-03-12 PROCEDURE — 74011250636 HC RX REV CODE- 250/636: Performed by: HOSPITALIST

## 2021-03-12 PROCEDURE — 74011250637 HC RX REV CODE- 250/637: Performed by: PHYSICIAN ASSISTANT

## 2021-03-12 PROCEDURE — 80048 BASIC METABOLIC PNL TOTAL CA: CPT

## 2021-03-12 PROCEDURE — 85027 COMPLETE CBC AUTOMATED: CPT

## 2021-03-12 PROCEDURE — 74011250636 HC RX REV CODE- 250/636: Performed by: INTERNAL MEDICINE

## 2021-03-12 PROCEDURE — 77030040393 HC DRSG OPTIFOAM GENT MDII -B

## 2021-03-12 PROCEDURE — 74011250637 HC RX REV CODE- 250/637: Performed by: HOSPITALIST

## 2021-03-12 RX ORDER — SULFAMETHOXAZOLE AND TRIMETHOPRIM 800; 160 MG/1; MG/1
1 TABLET ORAL 2 TIMES DAILY
Qty: 4 TAB | Refills: 0 | Status: SHIPPED
Start: 2021-03-12 | End: 2021-03-14

## 2021-03-12 RX ORDER — AMLODIPINE BESYLATE 5 MG/1
5 TABLET ORAL DAILY
Qty: 30 TAB | Refills: 0 | Status: SHIPPED
Start: 2021-03-13 | End: 2021-04-12

## 2021-03-12 RX ORDER — AMOXICILLIN 250 MG
1 CAPSULE ORAL 2 TIMES DAILY
Status: SHIPPED | COMMUNITY
Start: 2021-03-12 | End: 2021-08-04

## 2021-03-12 RX ORDER — ACETAMINOPHEN 500 MG
500 TABLET ORAL EVERY 6 HOURS
Qty: 48 TAB | Refills: 0 | Status: SHIPPED
Start: 2021-03-12 | End: 2021-03-24

## 2021-03-12 RX ORDER — POLYETHYLENE GLYCOL 3350 17 G/17G
17 POWDER, FOR SOLUTION ORAL DAILY
Status: SHIPPED | COMMUNITY
Start: 2021-03-13 | End: 2021-07-26 | Stop reason: ALTCHOICE

## 2021-03-12 RX ORDER — HYDROCODONE BITARTRATE AND ACETAMINOPHEN 5; 325 MG/1; MG/1
1 TABLET ORAL
Qty: 20 TAB | Refills: 0 | Status: SHIPPED | OUTPATIENT
Start: 2021-03-12 | End: 2021-03-19

## 2021-03-12 RX ADMIN — Medication 10 ML: at 06:03

## 2021-03-12 RX ADMIN — ACETAMINOPHEN 500 MG: 500 TABLET ORAL at 00:47

## 2021-03-12 RX ADMIN — CASTOR OIL AND BALSAM, PERU: 788; 87 OINTMENT TOPICAL at 09:53

## 2021-03-12 RX ADMIN — Medication 2000 UNITS: at 09:50

## 2021-03-12 RX ADMIN — ASPIRIN 81 MG: 81 TABLET, COATED ORAL at 09:50

## 2021-03-12 RX ADMIN — SERTRALINE HYDROCHLORIDE 100 MG: 50 TABLET ORAL at 09:50

## 2021-03-12 RX ADMIN — Medication 1 AMPULE: at 09:50

## 2021-03-12 RX ADMIN — VANCOMYCIN HYDROCHLORIDE 1000 MG: 1 INJECTION, POWDER, LYOPHILIZED, FOR SOLUTION INTRAVENOUS at 06:02

## 2021-03-12 RX ADMIN — AMLODIPINE BESYLATE 5 MG: 5 TABLET ORAL at 09:50

## 2021-03-12 RX ADMIN — POTASSIUM CHLORIDE 20 MEQ: 750 TABLET, FILM COATED, EXTENDED RELEASE ORAL at 09:50

## 2021-03-12 RX ADMIN — ENOXAPARIN SODIUM 70 MG: 80 INJECTION, SOLUTION INTRAVENOUS; SUBCUTANEOUS at 06:02

## 2021-03-12 RX ADMIN — Medication 1 AMPULE: at 00:47

## 2021-03-12 RX ADMIN — MEMANTINE HYDROCHLORIDE 10 MG: 10 TABLET ORAL at 09:50

## 2021-03-12 RX ADMIN — ACETAMINOPHEN 500 MG: 500 TABLET ORAL at 11:26

## 2021-03-12 NOTE — PROGRESS NOTES
[facility] to [facility] [address] [place] [address]                                                                        80 y.o.   female    [address]   Room: 3213/01    [facility] 3 ORTHOPEDICS  Unit Phone# :  [phone]      [address]   [place] [address]  Dept: [phone]  Loc: [phone]                    SITUATION     Admitted:  3/6/2021         Attending Provider:  Jose Ruiz MD       Consultations:  IP CONSULT TO ORTHOPEDIC SURGERY    PCP:  Cindi Malik DO   [phone]    Treatment Team: Attending Provider: Jose Ruiz MD; Consulting Provider: Melecio Horne DO; Consulting Provider: Jorge Clarke; Utilization Review: Lindsay Davila RN; Care Manager: Peña Weber Primary Nurse: Alex Stiles RN; Primary Nurse: Jaime Dwyer    Admitting Dx: Hip fracture ([facility].) [S72.009A]       Principal Problem: <principal problem not specified>    5 Days Post-Op of   Procedure(s):  LEFT FEMUR INSERTION INTRA MEDULLARY NAIL-URGENT   BY: Jensen CARUSO DO             ON: 3/7/2021                  Code Status: Full Code                Advance Directives:   Advance Care Planning 3/7/2021   Confirm Advance Directive Yes, not on file    (Send w/patient)   Not Received       Isolation:  There are currently no Active Isolations       MDRO: No current active infections    Pain Medications given:  Norco    Last dose: 3/09/2021 at  [address] needed: no  Type of equipment:         BACKGROUND     Allergies:   Allergies   Allergen Reactions    Pcn [Penicillins] Shortness of Breath       Past Medical History:   Diagnosis Date    [facility]     Dementia (HCC)     GERD (gastroesophageal reflux disease)     HTN (hypertension)     Hypercholesterolemia     MCI (mild cognitive impairment)        Past Surgical History:   Procedure Laterality Date    HX COLONOSCOPY  6/14 has had several    HX MARLEN AND BSO         Medications Prior to Admission   Medication Sig    sertraline (ZOLOFT) 100 mg tablet Take 1 Tab by mouth daily.  memantine (NAMENDA) 10 mg tablet TAKE 1 TABLET EVERY DAY    donepeziL (ARICEPT) 5 mg tablet Take 5 mg by mouth every evening.  acetaminophen (TYLENOL) 500 mg tablet Take 500 mg by mouth every six (6) hours as needed for Pain.  cholecalciferol, vitamin D3, (Vitamin D3) 50 mcg (2,000 unit) tab Take 1 Tab by mouth daily.  hydroCHLOROthiazide (HYDRODIURIL) 12.5 mg tablet Take 1 Tab by mouth daily.  aspirin delayed-release 81 mg tablet Take 1 Tab by mouth daily.  mineral oil-isopropyl myristat (HYDROCERIN) lotion Apply  to affected area as needed for Dry Skin.  Walker (ULTRA-LIGHT ROLLATOR) misc 1 Units by Does Not Apply route daily. Dx:  At risk for falls, frailty, decreased stamina    alendronate (FOSAMAX) 70 mg tablet Take 1 Tab by mouth every seven (7) days.  loratadine (CLARITIN) 10 mg tablet Take 1 Tab by mouth daily as needed for Allergies.  albuterol (PROVENTIL HFA, VENTOLIN HFA, PROAIR HFA) 90 mcg/actuation inhaler Take 2 Puffs by inhalation every four (4) hours as needed for Wheezing.  cyanocobalamin (VITAMIN B-12) 1,000 mcg tablet Take 1,000 mcg by mouth daily.  fluticasone (FLONASE) 50 mcg/actuation nasal spray 2 Sprays by Both Nostrils route daily. Hard scripts included in transfer packet yes    Vaccinations:    Immunization History   Administered Date(s) Administered    Influenza High Dose Vaccine PF 10/14/2016, 09/27/2017, 09/18/2019    Influenza Vaccine (>6 mo Afluria QUAD Vial 35713 (0.25 mL) / 06432 (0.5 mL)) 11/03/2015    Influenza Vaccine (Tri) Adjuvanted (>65 Yrs FLUAD TRI 23826) 10/04/2018    Pneumococcal Conjugate (PCV-13) 11/08/2016    Pneumococcal Polysaccharide (PPSV-23) 12/12/2017    Td, Adsorbed PF 07/07/2019       Readmission Risks:    Known Risks:          The Charlson CoMorbitiy Index tool is an evidenced based tool that has more automatic generated information. The tool looks at many different items such as the age of the patient, how many times they were admitted in the last calendar year, current length of stay in the hospital and their diagnosis. All of these items are pulled automatically from information documented in the chart from various places and will generate a score that predicts whether a patient is at low (less than 13), medium (13-20) or high (21 or greater) risk of being readmitted.        ASSESSMENT                Temp: 98.7 °F (37.1 °C) (03/12/21 0752) Pulse (Heart Rate): 71 (03/12/21 0752)     Resp Rate: 18 (03/12/21 0752)           BP: (!) 170/72 (03/12/21 0752)     O2 Sat (%): 97 % (03/12/21 0752)     Weight: 66.1 kg (145 lb 11.6 oz)    Height: 5' 7\" (170.2 cm) (03/10/21 1106)       If above not within 1 hour of discharge:    BP:_____  P:____  R:____ T:_____ O2 Sat: ___%  O2: ______    Active Orders   Diet    DIET REGULAR         Orientation: only aware of  place and person     Active Behaviors: None                                   Active Lines/Drains:  (Peg Tube / Grace / CL or S/L?): no    Urinary Status: Voiding     Last BM: Last Bowel Movement Date: 03/09/21     Skin Integrity: Incision (comment), Tear             Mobility: Very limited   Weight Bearing Status: WBAT (Weight Bearing as Tolerated)      Gait Training  Assistive Device: Gait belt, Walker, rolling  Ambulation - Level of Assistance: Minimal assistance, Assist x2, Additional time  Distance (ft): 5 Feet (ft)         Lab Results   Component Value Date/Time    Glucose 150 (H) 03/12/2021 02:18 AM    Hemoglobin A1c 5.6 10/25/2019 11:44 AM    INR 1.1 03/06/2021 02:04 PM    HGB 7.3 (L) 03/12/2021 02:18 AM    HGB 7.6 (L) 03/11/2021 03:59 AM        RECOMMENDATION     See After Visit Summary (AVS) for:  · Discharge instructions  · After Hospital Care Plan   · Special equipment needed (entered pre-discharge by Care  Management)  · Medication Reconciliation    · Follow up Appointment(s)         Report given/sent by:  Jayda Bishop                    Verbal report given to:  Nicole Mitchell LPN  FAXED to:           Estimated discharge time:  3/12/2021 at 0657

## 2021-03-12 NOTE — PROGRESS NOTES
Pharmacy Automatic Renal Dosing Protocol - Antimicrobials    Indication for Antimicrobials: UTI     Current Regimen of Each Antimicrobial:  Vancomycin  1250mg IV x1 then 750mg IV q12h   (Start Date 3/9; Day #2 of 5)    Previous Antimicrobial Therapy:  Levofloxacin 250 mg IV q24h (Start Date 3/7; Day # 4/)    Significant Cultures:   3/6 urine: Staph Epid >100 kg resistant to quinolone. (PS info to Dr Isaiah Vargas since ABX is ordered for 3 days)  3/6 UA cloudy, 3+ bacteria, + nitrites, WBC 10-20    Radiology / Imaging results: (X-ray, CT scan or MRI):     Paralysis, amputations, malnutrition:   VANC LEVEL PRIOR TO 18:00 DOSE 3/12     Labs:  Recent Labs     21  0359 03/10/21  0351   CREA 0.64 0.66   BUN 12 9   WBC 8.7 8.7     Temp (24hrs), Av.7 °F (37.1 °C), Min:98.2 °F (36.8 °C), Max:99.2 °F (37.3 °C)      Is the Patient on Dialysis? No    Creatinine Clearance (mL/min):   CrCl (Actual Body Weight): 58.0  CrCl (Adjusted Body Weight): 55.6  CrCl (Ideal Body Weight): 54.0    Impression/Plan:   Vancomycin trough 8.5 mcg/ml on 750 mg Q12H. Dose increase to 1000 mg Q12H with anticipated level of 11.3 mcg/ml. Daily BMP added + CBC x1  Antimicrobial stop date   5 days for Vancomycin     Pharmacy will follow daily and adjust medications as appropriate for renal function and/or serum levels.     Thank you,  Nikolas Montesinos, [facility]

## 2021-03-12 NOTE — DISCHARGE INSTRUCTIONS
Discharge Instructions: How to [facility]  Surgery: Hip Fracture Repair  Surgeon:   Aaron Simon DO  Surgery Date:  3/7/2021     To relieve pain:   Use ice/gel packs.  Put the ice pack directly over the wound, or anywhere you are hurting or swollen.  To control pain and swelling, keep ice on regularly, especially after physical activity.  The packs should stay cold for 3-4 hours. When it is not cold anymore, rotate with the packs in the freezer.  Elevate your leg. This will also keep swelling down.  Rest for at least 20 minutes between activity or exercises.  To keep track of your pain medications, write down what you take and when you take it.  The last dose of pain medication you got in the hospital was:     Medication    Dose    Date & Time           Choose your medications based on the pain scale below:     To keep your pain under control, take Tylenol every 6 hours for 14 days - even if you feel like you dont need it.  For mild to moderate pain (1-6 on pain scale), take one pain pill every 3-4 hours as needed.  To prevent nausea, take your pain medications with food. Pain Scale                 As your pain lessens:     Slowly start taking less pain medication. You may do this by waiting longer between doses or by taking smaller doses.  Stop using the pain medications as soon as you no longer need it, usually in 2-3 weeks.  Do not take non-steroidal anti-inflammatory (NSAID) medications (Ibuprofen, Advil, Motrin, Naproxen, etc.)                                                 OPSITE (Honeycomb dressing)     Keep your clear, waterproof dressing in place for 5-7 days after your leave the hospital.      If you are still having drainage, you will need to change your dressing in 5-7 days. You will be given one extra dressing to use at home.      If there is no more drainage from the wound, you may leave it open to the air. OPSITE DRESSING INSTRUCTIONS                 DO NOTs:   Do not rub your surgical wound   Do not put lotion or oils on your wound.  Do not take a tub bath or go swimming until your doctor says it is ok.  You may shower with this dressing over your wound.  After showering pat the dressing dry.  If you have staples a home health nurse will remove them in about 10 days.  To increase and promote healing:     Stop Smoking (or at least cut back on       Smoking).  Eat a well-balanced diet (high in protein       and vitamin C).  If you have a poor appetite, drink Ensure, Glucerna, or CarnationInstant Breakfast for the next 30 days.  If you are diabetic, you should control your blood         sugars to prevent infection and help your wound         to heal.       To prevent constipation, stay active & drink plenty of fluid.  While using pain medications, you should also take stool softeners and laxatives, such as Pericolace and Miralax.  If you are having too many bowel movements, then you may need  to stop taking the laxatives.  You should have a bowel movement 3-4 days after surgery and then at least every other day while on pain medication.  To improve your recovery, you must be active!  WEIGHT BEARING   As Tolerated = You can put as much weight on your leg as you can tolerate while walking.          THERAPY   If you go to a rehab facility, physical therapy (PT) will need to work with you daily, and sometimes twice a day to teach you how to:     Get in and out of the bed   Walk (gait training) and climb stairs   Do exercises and gain strength   Use a walker, crutches or cane     You may also need to have occupational therapy (OT) work with you to help you practice:     Getting on and off the toilet   Self-care (brushing teeth, eating, bathing, etc)     If you go directly home, home health physical therapy will come the day after you leave the hospital.  They will visit about 4 times over the next couple of weeks to teach you how to get out of bed, to safely walk in your home, and to do your exercises.  NO DRIVING until your surgeon tells you it is ok.  You can return to work when cleared by a physician.  Please call your physician immediately if you have:     Constant bleeding from your wound.  Increasing redness or swelling around your wound (Some warmth, bruising and swelling is normal).  Change in wound drainage (increase in amount, color, or bad smell).  Change in mental status (unusual behavior)     Temperature over 101.5 degrees Fahrenheit     Increased pain, swelling, or redness in the calf (back of your lower leg), thigh, ankle or foot.  Emergency: CALL 911 if you have:   Shortness of breath   Chest pain when you cough or taking a deep breath     Please call your surgeons office at [address] [place] [address] for a follow up appointment.  You should call as soon as you get home or the next day after discharge. Ask to make an appointment in 2 weeks. HOSPITALIST DISCHARGE INSTRUCTIONS    NAME: Dashawnarabella Sinclair   :  1932   MRN:  [id number]     Date/Time:  3/12/2021 10:35 AM    ADMIT DATE: 3/6/2021   DISCHARGE DATE: 3/12/2021         Recommended diet: Regular Diet    Recommended activity: PT/OT Eval and Treat    Wound care: Wound care to be done EVERY 3 days for the Left side of the back (skin tear):  Cleanse the wound with Caraklenz spray and wipe with gauze. Apply the Xeroform gauze and then a foam dressing. or per Ortho discharge orders    Indwelling devices:  None    Supplemental Oxygen: None    Required Lab work: Per SNF routine    Glucose management:  None    Code status: Full        Outside physician follow up:     Follow-up Information     Follow up With Specialties Details Why Contact Info    Zuhair Espino, DO Orthopedic Surgery In 2 weeks  [address] [facility] [address] [address] [phone]      Scottie Manuel DO Internal Medicine, Pediatric Medicine   [place] [address]  [address] [facility] [address] [phone]                 Skilled nursing facility/ SNF MD responsible for above on discharge. Information obtained by :  I understand that if any problems occur once I am at home I am to contact my physician. I understand and acknowledge receipt of the instructions indicated above.                                                                                                                                            Physician's or R.N.'s Signature                                                                  Date/Time                                                                                                                                              Patient or Repres

## 2021-03-12 NOTE — PROGRESS NOTES
Bedside and Verbal shift change report given to Joon Corey (oncoming nurse) by Elvis Robertson (offgoing nurse). Report included the following information SBAR, Kardex, Intake/Output, MAR, Recent Results and Med Rec Status.

## 2021-03-12 NOTE — PROGRESS NOTES
Hospitalist Progress Note    NAME: Bull Ip   :  1932   MRN:  [id number]       Assessment / Plan:  Comminuted intertrochanteric fracture of the left femur   s/p cephalomedullary nailing   Osteoporosis  -Transferred from [facility] for management of  left hip fracture. S/p GLF.  -ORIF 3/7 Dr Wang Phan   -will be on eliquis   -Pain management:  Scheduled tylenol + norco prn   -Grace: out and voiding   -BM: on bowel regiment  -PT OT eval and treat. DC planning to SNF    Acute LLE DVT   + Left calf TTP: duplex US with acute DVT   Transition lovenox to eliquis. Continue for 3 months    Post op acute blood loss anemia  -no melena noted. Hg over 7 and asymptomatic. The drift down is dilution from IVFs    Acute kidney injury, resolved  -prerenal and resolved with IVFs  -hold HCTZ     Urinary tract infection poa   - UA suggestive of UTI; LVQ started on admission, completed 3 doses but UC with staphh epi so switched to IV vanco x 3/5 days. Can transition to oral bactrim to complete course at SNF. Hypertension  -hold HCTZ. Typically avoid use of diuretics in this elderly and patient just recovered from SUMAN  -started norvasc      Hypokalemia, repleted. Stopping HCTZ     Depression  Dementia  -wo behavioral issues , MS stable   - Continue with Zoloft, Namenda, Aricept    Vit B12 deficiency:   -continue supplement     3/12. Discussed with daughter      Code Status: Full code   DVT Prophylaxis: SCDs for now     Baseline: Use a walker  Disposition: SNF once arrangement are done   covid pending      Subjective:     Chief Complaint / Reason for Physician Visit:   fu uti, dvt.  Hip fracture     No new complatints    Review of Systems:  Symptom Y/N Comments  Symptom Y/N Comments   Fever/Chills    Chest Pain     Poor Appetite    Edema     Cough    Abdominal Pain     Sputum    Joint Pain     SOB/PLUMMER    Pruritis/Rash     Nausea/vomit    Tolerating PT/OT     Diarrhea    Tolerating Diet Constipation    Other       Could NOT obtain due to: Dementia      Objective:     VITALS:   Last 24hrs VS reviewed since prior progress note. Most recent are:  Patient Vitals for the past 24 hrs:   Temp Pulse Resp BP SpO2   03/12/21 0752 98.7 °F (37.1 °C) 71 18 (!) 170/72 97 %   03/12/21 0234 98.8 °F (37.1 °C) 91 16 134/61 96 %   03/11/21 1924 99 °F (37.2 °C) 87 18 (!) 145/80 96 %   03/11/21 1513 98.8 °F (37.1 °C) 76 18 139/74 96 %   03/11/21 1124 98.2 °F (36.8 °C) 80 18 (!) 157/48 96 %       Intake/Output Summary (Last 24 hours) at 3/12/2021 1016  Last data filed at 3/12/2021 3066  Gross per 24 hour   Intake --   Output 250 ml   Net -250 ml        I had a face to face encounter and independently examined this patient on 3/12/2021, as outlined below:  PHYSICAL EXAM:  General: WD, WN. Alert, cooperative, no acute distress    EENT:  EOMI. Anicteric sclerae. MMM  Resp:  CTA bilaterally, no wheezing or rales. No accessory muscle use  CV:  Regular  rhythm,  No edema  GI:  Soft, Non distended, Non tender. +Bowel sounds  Neurologic:  Alert and oriented X 1-2, normal speech,   Psych:   poor insight. Not anxious nor agitated  Skin:  No rashes. No jaundice    Reviewed most current lab test results and cultures  YES  Reviewed most current radiology test results   YES  Review and summation of old records today    NO  Reviewed patient's current orders and MAR    YES  PMH/SH reviewed - no change compared to H&P  ________________________________________________________________________  Care Plan discussed with:    Comments   Patient x    Family  x    RN x    Care Manager x    Consultant                        x Multidiciplinary team rounds were held today with , nursing, pharmacist and clinical coordinator. Patient's plan of care was discussed; medications were reviewed and discharge planning was addressed.      ________________________________________________________________________      Total CRITICAL CARE TIME Spent: 15  Minutes non procedure based      Comments   >50% of visit spent in counseling and coordination of care x    ________________________________________________________________________  Mandi Solis MD     Procedures: see electronic medical records for all procedures/Xrays and details which were not copied into this note but were reviewed prior to creation of Plan. LABS:  I reviewed today's most current labs and imaging studies.   Pertinent labs include:  Recent Labs     03/12/21  0218 03/11/21  0359 03/10/21  0351   WBC 8.1 8.7 8.7   HGB 7.3* 7.6* 7.9*   HCT 22.5* 22.7* 23.8*    225 196     Recent Labs     03/12/21  0218 03/11/21  0359 03/10/21  0351    140 141   K 3.6 3.4* 3.2*   * 112* 112*   CO2 21 22 21   * 112* 101*   BUN 15 12 9   CREA 0.70 0.64 0.66   CA 8.4* 8.3* 7.9*   MG  --  2.0  --    PHOS  --  2.8  --        Signed: Mandi Solis MD

## 2021-03-12 NOTE — PROGRESS NOTES
Hard script for norco, AVS, H&P, discharge summary, MAR report, and facesheet placed in discharge folder to be given to [facility] to give to receiving RN. PIV taken out with cath tip intact. Extra dressing placed in personal belongings bag as well as pt's personal belongings to be taken with pt at time of discharge.

## 2021-03-12 NOTE — PROGRESS NOTES
End of Shift Note    Bedside shift change report given to Jing (oncoming nurse) by Tong Catalan (offgoing nurse). Report included the following information SBAR, Kardex, Intake/Output, MAR and Accordion    Shift worked:  8210-0332     Shift summary and any significant changes:    Patient had two episodes of loose stools     Concerns for physician to address:       Zone phone for oncoming shift:          Activity:  Activity Level: Bed Rest  Number times ambulated in hallways past shift: 0  Number of times OOB to chair past shift: 0    Cardiac:   Cardiac Monitoring: No      Cardiac Rhythm: Normal sinus rhythm    Access:   Current line(s): PIV     Genitourinary:   Urinary status: voiding and external catheter    Respiratory:   O2 Device: Room air  Chronic home O2 use?: NO  Incentive spirometer at bedside: YES     GI:  Last Bowel Movement Date: 03/09/21  Current diet:  DIET REGULAR  DIET NUTRITIONAL SUPPLEMENTS Breakfast, Lunch, Dinner; Ensure [employer]  Passing flatus: YES  Tolerating current diet: YES  % Diet Eaten: 50 %    Pain Management:   Patient states pain is manageable on current regimen: YES    Skin:  Ryne Score: 14  Interventions: increase time out of bed, limit briefs and internal/external urinary devices    Patient Safety:  Fall Score:  Total Score: 5  Interventions: bed/chair alarm, assistive device (walker, cane, etc), gripper socks and pt to call before getting OOB  High Fall Risk: Yes    Length of Stay:  Expected LOS: 4d 7h  Actual LOS: [facility]

## 2021-03-12 NOTE — DISCHARGE SUMMARY
Hospitalist Discharge Summary     Patient ID:  Alejandro Iqbal  [id number]  80 y.o.  12/19/1932    PCP on record: Anamika Ferris DO    Admit date: 3/6/2021  Discharge date and time: 3/12/2021      DISCHARGE DIAGNOSIS:    Comminuted intertrochanteric fracture of the left femur   s/p cephalomedullary nailing 03/07  Osteoporosis   Acute LLE DVT   Post op acute blood loss anemia  Acute kidney injury, resolved  Urinary tract infection poa   Hypertension  Hypokalemia  Depression  Dementia  Vit B12 deficiency:     CONSULTATIONS:  IP CONSULT TO ORTHOPEDIC SURGERY    Excerpted HPI from H&P of Samuel Alvarado MD:  CHIEF COMPLAINT: fall      HISTORY OF PRESENT ILLNESS:     Sharyn Noriega is a 80 y.o.  female with past medical history of COPD, hypertension, hyperlipidemia, Alzheimer who transferred from [facility] for management of  left hip fracture. Most of the HPI obtained from the transfer papers, as unable to reach patient daughter with whom she lives and patient is currently confused. Patient reportedly fell yesterday evening after she attempted to go up a stair . Patient has fallen three times within a week, has had some ambulatory dysfunction. Vital signs  on admission are stable, labs revealed mildly elevated white blood cell count, elevated creatinine  X-ray of the left hip showed Acute varus angulated comminuted intertrochanteric fracture of the  left femur. We were asked to admit for work up and evaluation of the above problems. ______________________________________________________________________  DISCHARGE SUMMARY/HOSPITAL COURSE:  for full details see H&P, daily progress notes, labs, consult notes. Comminuted intertrochanteric fracture of the left femur   s/p cephalomedullary nailing 03/07  Osteoporosis  -Transferred from [facility] for management of  left hip fracture.  S/p GLF.  -ORIF 3/7 Dr Madalyn Aaron   -will be on eliquis   -Pain management:  Scheduled tylenol + norco prn   -Grace: out and voiding   -BM: on bowel regiment  -PT OT eval and treat. DC planning to SNF  -continue ni with Vit D,  Fracture due to fall and osteoporosis     Acute LLE DVT   + Left calf TTP: duplex US with acute DVT   Transition lovenox to eliquis. Continue for 3 months     Post op acute blood loss anemia  -no melena noted. Hg over 7 and asymptomatic. The drift down is dilution from IVFs     Acute kidney injury, resolved  -prerenal and resolved with IVFs  -hold HCTZ     Urinary tract infection poa   - UA suggestive of UTI; LVQ started on admission, completed 3 doses but UC with staphh epi so switched to IV vanco x 3/5 days. Can transition to oral bactrim to complete course at SNF.     Hypertension  -hold HCTZ. Typically avoid use of diuretics in this elderly and patient just recovered from SUMAN  -started norvasc      Hypokalemia, repleted. Stopping HCTZ      Depression  Dementia  -wo behavioral issues , MS stable   - Continue with Zoloft, Namenda, Aricept     Vit B12 deficiency:   -continue supplement       _______________________________________________________________________  Patient seen and examined by me on discharge day. Pertinent Findings:  Gen:    Not in distress  Chest: Clear lungs  CVS:   Regular rhythm. No edema  Abd:  Soft, not distended, not tender  Neuro:  Alert, Oriented x 4, grossly non focal exam  _______________________________________________________________________  DISCHARGE MEDICATIONS:   Current Discharge Medication List      START taking these medications    Details   amLODIPine (NORVASC) 5 mg tablet Take 1 Tab by mouth daily for 30 days. Qty: 30 Tab, Refills: 0      !! apixaban (ELIQUIS) 5 mg tablet Take 2 Tabs by mouth two (2) times a day for 7 days. Qty: 28 Tab, Refills: 0      !! apixaban (Eliquis) 5 mg tablet Take 1 Tab by mouth two (2) times a day for 60 days.   Qty: 60 Tab, Refills: 1      HYDROcodone-acetaminophen ([address]) 5-325 mg per tablet Take 1 Tab by mouth every six (6) hours as needed for Pain for up to 7 days. Max Daily Amount: 4 Tabs. Qty: 20 Tab, Refills: 0    Associated Diagnoses: Closed fracture of right hip, initial encounter ([facility] [address]      polyethylene glycol (MIRALAX) 17 gram packet Take 1 Packet by mouth daily. Qty:        senna-docusate (PERICOLACE) 8.6-50 mg per tablet Take 1 Tab by mouth two (2) times a day. Qty:        trimethoprim-sulfamethoxazole (Bactrim DS) 160-800 mg per tablet Take 1 Tab by mouth two (2) times a day for 2 days. Qty: 4 Tab, Refills: 0       !! - Potential duplicate medications found. Please discuss with provider. CONTINUE these medications which have CHANGED    Details   acetaminophen (TYLENOL) 500 mg tablet Take 1 Tab by mouth every six (6) hours for 48 doses. Qty: 48 Tab, Refills: 0         CONTINUE these medications which have NOT CHANGED    Details   sertraline (ZOLOFT) 100 mg tablet Take 1 Tab by mouth daily. Qty: 90 Tab, Refills: 3      memantine (NAMENDA) 10 mg tablet TAKE 1 TABLET EVERY DAY  Qty: 90 Tab, Refills: 1      donepeziL (ARICEPT) 5 mg tablet Take 5 mg by mouth every evening. cholecalciferol, vitamin D3, (Vitamin D3) 50 mcg (2,000 unit) tab Take 1 Tab by mouth daily. aspirin delayed-release 81 mg tablet Take 1 Tab by mouth daily. Qty: 90 Tab, Refills: 2      albuterol (PROVENTIL HFA, VENTOLIN HFA, PROAIR HFA) 90 mcg/actuation inhaler Take 2 Puffs by inhalation every four (4) hours as needed for Wheezing. Qty: 3 Inhaler, Refills: 3    Associated Diagnoses: Chronic obstructive pulmonary disease, unspecified COPD type (HCC)      cyanocobalamin (VITAMIN B-12) 1,000 mcg tablet Take 1,000 mcg by mouth daily. fluticasone (FLONASE) 50 mcg/actuation nasal spray 2 Sprays by Both Nostrils route daily.   Qty: 3 Bottle, Refills: 3         STOP taking these medications       hydroCHLOROthiazide (HYDRODIURIL) 12.5 mg tablet Comments:   Reason for Stopping: mineral oil-isopropyl myristat (HYDROCERIN) lotion Comments:   Reason for Stopping:         Walker (ULTRA-LIGHT ROLLATOR) misc Comments:   Reason for Stopping:         alendronate (FOSAMAX) 70 mg tablet Comments:   Reason for Stopping:         loratadine (CLARITIN) 10 mg tablet Comments:   Reason for Stopping:               My Recommended Diet, Activity, Wound Care, and follow-up labs are listed in the patient's Discharge Insturctions which I have personally completed and reviewed.   Risk of deterioration: Low    Condition at Discharge:  Stable  _____________________________________________________________________    Disposition  SNF/LTC  ____________________________________________________________________    Care Plan discussed with:   Patient, Family, RN, Care Manager, Consultant    ____________________________________________________________________    Code Status: Full Code  ____________________________________________________________________      Condition at Discharge:  Stable  _____________________________________________________________________  Follow up with:   PCP : Haylee Yates, DO  Follow-up Information     Follow up With Specialties Details Why Contact Estevan Shettye Hawthorne, DO Orthopedic Surgery In 2 weeks  [address] [facility] [address]  [address] [phone], [address],  Internal Medicine, Pediatric Medicine   [facility] [address]  [facility]  [phone]                Total time in minutes spent coordinating this discharge (includes going over instructions, follow-up, prescriptions, and preparing report for sign off to her PCP) :  35 minutes    Signed:  Nancy Morris MD

## 2021-03-12 NOTE — PROGRESS NOTES
Transition of Care Plan:  [id number] [facility]  Follow up appointments: ortho, PCP  DME needed: n/a  Transportation at [address] 12:00pm  [employer] or means to access home: n/a  IM Medicare letter: to be provided prior to d/c  Caregiver Contact: daughter Nida Romero [phone] or [phone]  Discharge Caregiver contacted prior to discharge?  yes     Call report [phone] going to room 152 Left   [facility] @ 11:45am     10:35am  CAPRICE received phone call from [facility] stating that transportation is arranged for 11:45am.     CAPRICE contacted patient's daughter, Nida Romero, and informed her of d/c. MD already spoke to her about ABX change. Daughter agrees with d/c today to SNF. CAPRICE has faxed d/c summary to SNF per DON request. LM for DON informing her of d/c time. 10:15am  MD informed CM that patient can d/c today on PO ABX instead of IV vanc. CAPRICE contacted [facility] ([phone]) to inform her of ABX change and inquire if they can accept patient today still. Per DON they can accept patient today as long as patient is at their facility before 2:00pm as their pharmacy closes early on Fridays. CAPRICE sent message to [facility] requesting 11:30am transport. CM contacted [facility], [address] liaison, to ask her to help with ensuring patient has transportation by at least 12:00pm so that patient can be at facility by 2:00pm. [facility] said she would work on this and contact CAPRICE back.      Chino Gray, [address]

## 2021-03-12 NOTE — PROGRESS NOTES
Transition of Care Plan to SNF/Rehab    SNF/Rehab Transition:  Patient has been accepted to [facility] and meets criteria for admission. Patient will transported by [facility] and expected to leave at 11:45am.    Communication to Patient/Family:  Met with patient and daughter (identified care giver) and they are agreeable to the transition plan. Communication to SNF/Rehab:  Bedside RN, Bobby Harvey, has been notified to update the transition plan to the facility and call report (phone number [phone]). Discharge information has been updated on the AVS.     Discharge instructions to be fax'd to facility at [facility] # [phone]). Nursing Please include all hard scripts for controlled substances, med rec and dc summary, and AVS in packet. Reviewed and confirmed with facility, [facility], can manage the patient care needs for the following:     Sherald Spurling with (X) only those applicable:    Medication:  [x]  Medications will be available at the facility  []  IV Antibiotics   [x]  Controlled Substance - hard copy to be sent with patient   []  Weekly Labs   Documents:  [x] Hard RX  [x] MAR  [x] Kardex  [x] AVS  [x]Transfer Summary  [x]Discharge   Equipment:  []  CPAP/BiPAP  []  Wound Vacuum  []  Grace or Urinary Device  []  PICC/Central Line  []  Nebulizer  []  Ventilator   Treatment:  []Isolation (for MRSA, VRE, etc.)  []Surgical Drain Management  []Tracheostomy Care  []Dressing Changes  []Dialysis with transportation and chair time. []PEG Care  []Oxygen  []Daily Weights for Heart Failure   Dietary:  []Any diet limitations  []Tube Feedings   []Total Parenteral Management (TPN)   Eligible for Medicaid Long Term Services and Supports  Yes:  [] Eligible for medical assistance or will become eligible within 180 days and UAI completed. [] Provider/Patient and/or support system has requested screening. [] UAI copy provided to patient or responsible party.   [] UAI unavailable at discharge will send once processed to SNF provider. [] UAI unavailable at discharged mailed to patient  No:   [] Private pay and is not financially eligible for Medicaid within the next 180 days. [] Reside out-of-state.   [] A residents of a state owned/operated facility that is licensed  by [address] [employer] [facility] or [facility]  [] Enrollment in [facility] hospice services  [] [address] [place] [address] citizen  [x] Patient /Family declines to have screening completed or provide financial information for screening     Financial Resources:  Medicaid    [] Initiated and application pending   [] Full coverage     Advanced Care Plan:  []Surrogate Decision Maker of Care  []POA  [x]Communicated Code Status Full Code   Other     Bobbe Gowers, [place], [facility]

## 2021-03-13 NOTE — PROGRESS NOTES
Patient transported with [facility]. All belongings removed from the room. Hospital to SNF progress note sent in folder to facility with patient. Discharge paperwork prepared and sent with patient by charge nurse Princess Downey RN.

## 2021-03-15 ENCOUNTER — HOSPITAL ENCOUNTER (OUTPATIENT)
Dept: LAB | Age: 86
Discharge: HOME | End: 2021-03-15

## 2021-03-15 LAB
ALBUMIN SERPL-MCNC: 2.7 G/DL (ref 3.5–5)
ALBUMIN/GLOB SERPL: 0.9 {RATIO} (ref 1.1–2.2)
ALP SERPL-CCNC: 113 U/L (ref 45–117)
ALT SERPL-CCNC: 41 U/L (ref 12–78)
ANION GAP SERPL CALC-SCNC: 13 MMOL/L (ref 5–15)
AST SERPL-CCNC: 38 U/L (ref 15–37)
BASOPHILS # BLD: 0.1 K/UL (ref 0–0.1)
BASOPHILS NFR BLD: 1 % (ref 0–1)
BILIRUB SERPL-MCNC: 0.7 MG/DL (ref 0.2–1)
BUN SERPL-MCNC: 19 MG/DL (ref 6–20)
BUN/CREAT SERPL: 17 (ref 12–20)
CALCIUM SERPL-MCNC: 8.5 MG/DL (ref 8.5–10.1)
CHLORIDE SERPL-SCNC: 106 MMOL/L (ref 97–108)
CO2 SERPL-SCNC: 25 MMOL/L (ref 21–32)
CREAT SERPL-MCNC: 1.15 MG/DL (ref 0.55–1.02)
DIFFERENTIAL METHOD BLD: ABNORMAL
EOSINOPHIL # BLD: 0.4 K/UL (ref 0–0.4)
EOSINOPHIL NFR BLD: 5 % (ref 0–7)
ERYTHROCYTE [DISTWIDTH] IN BLOOD BY AUTOMATED COUNT: 14.6 % (ref 11.5–14.5)
GLOBULIN SER CALC-MCNC: 3 G/DL (ref 2–4)
GLUCOSE SERPL-MCNC: 116 MG/DL (ref 65–100)
HCT VFR BLD AUTO: 24.9 % (ref 35–47)
HGB BLD-MCNC: 8 G/DL (ref 11.5–16)
IMM GRANULOCYTES # BLD AUTO: 0.2 K/UL (ref 0–0.04)
IMM GRANULOCYTES NFR BLD AUTO: 2 % (ref 0–0.5)
IRON SATN MFR SERPL: 23 % (ref 20–50)
IRON SERPL-MCNC: 57 UG/DL (ref 50–170)
LYMPHOCYTES # BLD: 1.9 K/UL (ref 0.8–3.5)
LYMPHOCYTES NFR BLD: 22 % (ref 12–49)
MCH RBC QN AUTO: 31.7 PG (ref 26–34)
MCHC RBC AUTO-ENTMCNC: 32.1 G/DL (ref 30–36.5)
MCV RBC AUTO: 98.8 FL (ref 80–99)
MONOCYTES # BLD: 0.9 K/UL (ref 0–1)
MONOCYTES NFR BLD: 10 % (ref 5–13)
NEUTS SEG # BLD: 5.2 K/UL (ref 1.8–8)
NEUTS SEG NFR BLD: 60 % (ref 32–75)
NRBC # BLD: 0 K/UL (ref 0–0.01)
NRBC BLD-RTO: 0 PER 100 WBC
PLATELET # BLD AUTO: 367 K/UL (ref 150–400)
PMV BLD AUTO: 9.7 FL (ref 8.9–12.9)
POTASSIUM SERPL-SCNC: 4 MMOL/L (ref 3.5–5.1)
PROT SERPL-MCNC: 5.7 G/DL (ref 6.4–8.2)
RBC # BLD AUTO: 2.52 M/UL (ref 3.8–5.2)
RBC MORPH BLD: ABNORMAL
RBC MORPH BLD: ABNORMAL
SODIUM SERPL-SCNC: 144 MMOL/L (ref 136–145)
TIBC SERPL-MCNC: 245 UG/DL (ref 250–450)
WBC # BLD AUTO: 8.7 K/UL (ref 3.6–11)

## 2021-03-15 PROCEDURE — 85025 COMPLETE CBC W/AUTO DIFF WBC: CPT

## 2021-03-15 PROCEDURE — 83540 ASSAY OF IRON: CPT

## 2021-03-15 PROCEDURE — 80053 COMPREHEN METABOLIC PANEL: CPT

## 2021-03-16 ENCOUNTER — HOSPITAL ENCOUNTER (OUTPATIENT)
Dept: LAB | Age: 86
Discharge: HOME | End: 2021-03-16
Payer: COMMERCIAL

## 2021-03-16 ENCOUNTER — TRANSCRIBE ORDER (OUTPATIENT)
Dept: REGISTRATION | Age: 86
End: 2021-03-16

## 2021-03-16 DIAGNOSIS — S72.142D CLOSED DISPLACED INTERTROCHANTERIC FRACTURE OF LEFT FEMUR WITH ROUTINE HEALING: ICD-10-CM

## 2021-03-16 DIAGNOSIS — N18.30 CHRONIC KIDNEY DISEASE, STAGE III (MODERATE) (HCC): ICD-10-CM

## 2021-03-16 DIAGNOSIS — I10 ESSENTIAL HYPERTENSION, MALIGNANT: ICD-10-CM

## 2021-03-16 DIAGNOSIS — I10 ESSENTIAL HYPERTENSION, MALIGNANT: Primary | ICD-10-CM

## 2021-03-16 LAB
ANION GAP SERPL CALC-SCNC: 14 MMOL/L (ref 5–15)
BASOPHILS # BLD: 0.1 K/UL (ref 0–0.1)
BASOPHILS NFR BLD: 1 % (ref 0–1)
BUN SERPL-MCNC: 25 MG/DL (ref 6–20)
BUN/CREAT SERPL: 24 (ref 12–20)
CALCIUM SERPL-MCNC: 8.8 MG/DL (ref 8.5–10.1)
CHLORIDE SERPL-SCNC: 106 MMOL/L (ref 97–108)
CO2 SERPL-SCNC: 22 MMOL/L (ref 21–32)
CREAT SERPL-MCNC: 1.05 MG/DL (ref 0.55–1.02)
DIFFERENTIAL METHOD BLD: ABNORMAL
EOSINOPHIL # BLD: 0.3 K/UL (ref 0–0.4)
EOSINOPHIL NFR BLD: 4 % (ref 0–7)
ERYTHROCYTE [DISTWIDTH] IN BLOOD BY AUTOMATED COUNT: 14.8 % (ref 11.5–14.5)
GLUCOSE SERPL-MCNC: 110 MG/DL (ref 65–100)
HCT VFR BLD AUTO: 25.9 % (ref 35–47)
HGB BLD-MCNC: 8.5 G/DL (ref 11.5–16)
IMM GRANULOCYTES # BLD AUTO: 0.2 K/UL (ref 0–0.04)
IMM GRANULOCYTES NFR BLD AUTO: 2 % (ref 0–0.5)
LYMPHOCYTES # BLD: 1.8 K/UL (ref 0.8–3.5)
LYMPHOCYTES NFR BLD: 20 % (ref 12–49)
MCH RBC QN AUTO: 32 PG (ref 26–34)
MCHC RBC AUTO-ENTMCNC: 32.8 G/DL (ref 30–36.5)
MCV RBC AUTO: 97.4 FL (ref 80–99)
MONOCYTES # BLD: 0.9 K/UL (ref 0–1)
MONOCYTES NFR BLD: 10 % (ref 5–13)
NEUTS SEG # BLD: 5.6 K/UL (ref 1.8–8)
NEUTS SEG NFR BLD: 63 % (ref 32–75)
NRBC # BLD: 0.02 K/UL (ref 0–0.01)
NRBC BLD-RTO: 0.2 PER 100 WBC
PLATELET # BLD AUTO: 413 K/UL (ref 150–400)
PMV BLD AUTO: 9.5 FL (ref 8.9–12.9)
POTASSIUM SERPL-SCNC: 4 MMOL/L (ref 3.5–5.1)
RBC # BLD AUTO: 2.66 M/UL (ref 3.8–5.2)
SODIUM SERPL-SCNC: 142 MMOL/L (ref 136–145)
WBC # BLD AUTO: 8.9 K/UL (ref 3.6–11)

## 2021-03-16 PROCEDURE — 80048 BASIC METABOLIC PNL TOTAL CA: CPT

## 2021-03-16 PROCEDURE — 85025 COMPLETE CBC W/AUTO DIFF WBC: CPT

## 2021-03-23 ENCOUNTER — HOSPITAL ENCOUNTER (OUTPATIENT)
Dept: LAB | Age: 86
Discharge: HOME | End: 2021-03-23

## 2021-03-23 LAB
BASOPHILS # BLD: 0.1 K/UL (ref 0–0.1)
BASOPHILS NFR BLD: 2 % (ref 0–1)
DIFFERENTIAL METHOD BLD: ABNORMAL
EOSINOPHIL # BLD: 0.2 K/UL (ref 0–0.4)
EOSINOPHIL NFR BLD: 3 % (ref 0–7)
ERYTHROCYTE [DISTWIDTH] IN BLOOD BY AUTOMATED COUNT: 15.1 % (ref 11.5–14.5)
HCT VFR BLD AUTO: 28.6 % (ref 35–47)
HGB BLD-MCNC: 9.1 G/DL (ref 11.5–16)
IMM GRANULOCYTES # BLD AUTO: 0.1 K/UL (ref 0–0.04)
IMM GRANULOCYTES NFR BLD AUTO: 1 % (ref 0–0.5)
LYMPHOCYTES # BLD: 1.9 K/UL (ref 0.8–3.5)
LYMPHOCYTES NFR BLD: 28 % (ref 12–49)
MCH RBC QN AUTO: 31.8 PG (ref 26–34)
MCHC RBC AUTO-ENTMCNC: 31.8 G/DL (ref 30–36.5)
MCV RBC AUTO: 100 FL (ref 80–99)
MONOCYTES # BLD: 0.6 K/UL (ref 0–1)
MONOCYTES NFR BLD: 8 % (ref 5–13)
NEUTS SEG # BLD: 4 K/UL (ref 1.8–8)
NEUTS SEG NFR BLD: 59 % (ref 32–75)
NRBC # BLD: 0 K/UL (ref 0–0.01)
NRBC BLD-RTO: 0 PER 100 WBC
PLATELET # BLD AUTO: 625 K/UL (ref 150–400)
PMV BLD AUTO: 9 FL (ref 8.9–12.9)
RBC # BLD AUTO: 2.86 M/UL (ref 3.8–5.2)
WBC # BLD AUTO: 6.9 K/UL (ref 3.6–11)

## 2021-03-23 PROCEDURE — 85025 COMPLETE CBC W/AUTO DIFF WBC: CPT

## 2021-03-24 ENCOUNTER — TELEPHONE (OUTPATIENT)
Dept: FAMILY MEDICINE CLINIC | Age: 86
End: 2021-03-24

## 2021-03-24 NOTE — TELEPHONE ENCOUNTER
----- Message from Brandon Quintero sent at 3/24/2021  9:23 AM EDT -----  Regarding: Do Good/Telephone  General Message/Vendor Calls    Caller's first and last name: Sammy Wiley , son      Reason for call: requesting the name and phone number of the home care group from last year; per [facility] nursing home; to start home health care for the pt       Callback required yes/no and why: yes      Best contact number(s): [phone]      Details to clarify the request: n/a      Brandon Quintero

## 2021-03-25 ENCOUNTER — TELEPHONE (OUTPATIENT)
Dept: FAMILY MEDICINE CLINIC | Age: 86
End: 2021-03-25

## 2021-03-25 NOTE — TELEPHONE ENCOUNTER
Patient is being discharged from [facility] tomorrow and Dr. Caballero Askholger will give her scripts for Eliquis and amlodipine although he questions why she was discharged on amlodipine and wants you to assess that.

## 2021-03-29 ENCOUNTER — TELEPHONE (OUTPATIENT)
Dept: FAMILY MEDICINE CLINIC | Age: 86
End: 2021-03-29

## 2021-03-31 ENCOUNTER — TELEPHONE (OUTPATIENT)
Dept: FAMILY MEDICINE CLINIC | Age: 86
End: 2021-03-31

## 2021-03-31 DIAGNOSIS — S72.001A CLOSED FRACTURE OF RIGHT HIP, INITIAL ENCOUNTER (HCC): Primary | ICD-10-CM

## 2021-03-31 NOTE — TELEPHONE ENCOUNTER
Please see the answers to each question below:      Mariana/ Nurse ([phone]) with [facility] called, she has the following questions:    1. Pt has been prescribed both Elaquis and a daily Aspirin - should the patient take both? (She has advised the pt and family not to take ASA until they hear back from us)    Has she had a history of heart stenting or TIA/Stroke - if either of these is yes, then she should continue both. I think she also sees Dr. Charlie Churchill? I would verify with him as well. If no TIA or hx stroke or stents, then OK to hold ASA at this time. 2. Can you send an order for a [address], the fax number is [phone]    Order for home health placed    3. The patient is not walking much and is unsteady on her feet; requesting orders for a bedside commode and transport wheelchair, to get patient back and forth to bathroom.      Scripts printed and they will need to figure out where they would like to get the DME or pick it up in the meantime

## 2021-03-31 NOTE — TELEPHONE ENCOUNTER
Please see the answers to each question below:      Mariana/VINI Nurse ([phone]) with [facility] called, she has the following questions:    1. Pt has been prescribed both Elaquis and a daily Aspirin - should the patient take both? (She has advised the pt and family not to take ASA until they hear back from us)    Has she had a history of heart stenting or TIA/Stroke - if either of these is yes, then she should continue both. I think she also sees Dr. Angelito Moser? I would verify with him as well. If no TIA or hx stroke or stents, then OK to hold ASA at this time. 2. Can you send an order for a [address], the fax number is [phone]    Order for home health placed    3. The patient is not walking much and is unsteady on her feet; requesting orders for a bedside commode and transport wheelchair, to get patient back and forth to bathroom.      Scripts printed and they will need to figure out where they would like to get the DME or pick it up in the meantime

## 2021-04-01 NOTE — TELEPHONE ENCOUNTER
Nacho Hopson MD  Author JM Giron   Caller: Unspecified (Yesterday,  9:06 AM)             Unfortunately has acute DVT as well so will need Eliquis, would hold the ASA for now and see in 3 mos.  Thanks [address]  Mariana/ Nurse ([phone]) with [facility] - no answer - l/m stating pt can call me back regarding the [address] vs asa or Dr. Shahid Fitting office regarding ALL.

## 2021-04-02 NOTE — TELEPHONE ENCOUNTER
As per Dr. Nedra Hsu.   Let's follow up with her in 1-2 weeks and see how she is doing with the new medication and feeling overall

## 2021-04-05 NOTE — TELEPHONE ENCOUNTER
Can we check with home health/patient to be sure that  message made it through? Thanks!     Hold aspirin, continue other anticoagulation

## 2021-04-07 NOTE — TELEPHONE ENCOUNTER
Per caregiver she is now on Eliquis 5 mg bid she does not see ortho for follow up until 05.17.21 and her discharge papers state she is to remain on med until then but they do not have any more refills can a refill be sent to [facility].

## 2021-04-09 NOTE — TELEPHONE ENCOUNTER
Medication sent to [facility] in [place].  She needs to continue to monitor for any signs of bleeding.  If they notice any bleeding or darkening of the stool, mucosal bleeding, nasal mucosal bleeding, let us know right away.

## 2021-04-14 ENCOUNTER — TELEPHONE (OUTPATIENT)
Dept: FAMILY MEDICINE CLINIC | Age: 86
End: 2021-04-14

## 2021-04-14 DIAGNOSIS — S72.001A CLOSED FRACTURE OF RIGHT HIP, INITIAL ENCOUNTER (HCC): Primary | ICD-10-CM

## 2021-04-14 NOTE — TELEPHONE ENCOUNTER
----- Message from Tresa Alcazar sent at 4/14/2021  4:33 PM EDT -----  Regarding: Dr Yessy Burns  General Message/Vendor Calls    Caller's first and last name: Kathy Ortiz, [place]      Reason for call: Sidney Rodriguez is reporting that pt's [address] is about to run out and is requesting a  new Rx for Physical Therapy submitted to [facility]       Callback required yes/no and why: yes      Best contact number(s):[phone]      Details to clarify the request:      Tresa Alcazar

## 2021-04-27 ENCOUNTER — TELEPHONE (OUTPATIENT)
Dept: FAMILY MEDICINE CLINIC | Age: 86
End: 2021-04-27

## 2021-04-27 DIAGNOSIS — Z87.81 S/P LEFT HIP FRACTURE: Primary | ICD-10-CM

## 2021-04-27 NOTE — TELEPHONE ENCOUNTER
----- Message from Duane Domingo sent at 4/27/2021 10:26 AM EDT -----  Regarding: Dr Angela Johnson Message/Vendor Calls    Caller's first and last name:Melody with [employer]      Reason for call:states PT order says fracture of right hip,however the caregiver states it should be fracture of left hip. Please advise.       Callback required yes/no and why:yes      Best contact number(s):[phone]      Details to clarify the request:      Duane Domingo

## 2021-05-03 ENCOUNTER — OFFICE VISIT (OUTPATIENT)
Dept: FAMILY MEDICINE CLINIC | Age: 86
End: 2021-05-03
Payer: MEDICARE

## 2021-05-03 VITALS
RESPIRATION RATE: 20 BRPM | TEMPERATURE: 96.2 F | SYSTOLIC BLOOD PRESSURE: 138 MMHG | BODY MASS INDEX: 22.82 KG/M2 | OXYGEN SATURATION: 96 % | HEIGHT: 67 IN | HEART RATE: 81 BPM | DIASTOLIC BLOOD PRESSURE: 76 MMHG

## 2021-05-03 DIAGNOSIS — M25.551 HIP PAIN, ACUTE, RIGHT: Primary | ICD-10-CM

## 2021-05-03 PROCEDURE — 99213 OFFICE O/P EST LOW 20 MIN: CPT | Performed by: INTERNAL MEDICINE

## 2021-05-03 RX ORDER — DICLOFENAC SODIUM 10 MG/G
GEL TOPICAL 4 TIMES DAILY
Qty: 150 G | Refills: 5 | Status: SHIPPED | OUTPATIENT
Start: 2021-05-03 | End: 2021-07-26 | Stop reason: ALTCHOICE

## 2021-05-03 RX ORDER — FERROUS GLUCONATE 324(38)MG
324 TABLET ORAL 2 TIMES DAILY WITH MEALS
COMMUNITY
End: 2021-07-26 | Stop reason: ALTCHOICE

## 2021-05-03 RX ORDER — LIDOCAINE 40 MG/G
CREAM TOPICAL
Qty: 90 G | Refills: 5 | Status: SHIPPED | OUTPATIENT
Start: 2021-05-03 | End: 2021-07-26 | Stop reason: ALTCHOICE

## 2021-05-03 RX ORDER — AMLODIPINE BESYLATE 5 MG/1
5 TABLET ORAL DAILY
COMMUNITY
End: 2021-05-18

## 2021-05-03 NOTE — PATIENT INSTRUCTIONS
Seamus Estrada MD 
COVID-19 info: [facility].gov Address: [address] [place] Phone: [phone] Ask for [place] location Hip Pain: Care Instructions Your Care Instructions Hip pain may be caused by many things, including overuse, a fall, or a twisting movement. Another cause of hip pain is arthritis. Your pain may increase when you stand up, walk, or squat. The pain may come and go or may be constant. Home treatment can help relieve hip pain, swelling, and stiffness. If your pain is ongoing, you may need more tests and treatment. Follow-up care is a key part of your treatment and safety. Be sure to make and go to all appointments, and call your doctor if you are having problems. It's also a good idea to know your test results and keep a list of the medicines you take. How can you care for yourself at home? · Take pain medicines exactly as directed. ? If the doctor gave you a prescription medicine for pain, take it as prescribed. ? If you are not taking a prescription pain medicine, ask your doctor if you can take an over-the-counter medicine. · Rest and protect your hip. Take a break from any activity, including standing or walking, that may cause pain. · Put ice or a cold pack against your hip for 10 to 20 minutes at a time. Try to do this every 1 to 2 hours for the next 3 days (when you are awake) or until the swelling goes down. Put a thin cloth between the ice and your skin. · Sleep on your healthy side with a pillow between your knees, or sleep on your back with pillows under your knees. · If there is no swelling, you can put moist heat, a heating pad, or a warm cloth on your hip. Do gentle stretching exercises to help keep your hip flexible. · Learn how to prevent falls. Have your vision and hearing checked regularly. Wear slippers or shoes with a nonskid sole. · Stay at a healthy weight. · Wear comfortable shoes. When should you call for help?  
 Call 911 anytime you think you may need emergency care. For example, call if: 
  · You have sudden chest pain and shortness of breath, or you cough up blood.  
  · You are not able to stand or walk or bear weight.  
  · Your buttocks, legs, or feet feel numb or tingly.  
  · Your leg or foot is cool or pale or changes color.  
  · You have severe pain. Call your doctor now or seek immediate medical care if: 
  · You have signs of infection, such as: 
? Increased pain, swelling, warmth, or redness in the hip area. ? Red streaks leading from the hip area. ? Pus draining from the hip area. ? A fever.  
  · You have signs of a blood clot, such as: 
? Pain in your calf, back of the knee, thigh, or groin. ? Redness and swelling in your leg or groin.  
  · You are not able to bend, straighten, or move your leg normally.  
  · You have trouble urinating or having bowel movements. Watch closely for changes in your health, and be sure to contact your doctor if: 
  · You do not get better as expected. Where can you learn more? Go to http://krzysztof-zahraa.info/ Enter J318 in the search box to learn more about \"Hip Pain: Care Instructions. \" Current as of: February 26, 2020               Content Version: 12.8 © 2006-2021 [employer]. Care instructions adapted under license by [employer] (which disclaims liability or warranty for this information). If you have questions about a medical condition or this instruction, always ask your healthcare professional. [place] [address] any warranty or liability for your use of this information.

## 2021-05-03 NOTE — PROGRESS NOTES
1. Have you been to the ER, urgent care clinic since your last visit? Hospitalized since your last visit? Yes When: 03.06.21 - 03.12.21. Where: [facility]. Reason for visit: Hip Fx.    2. Have you seen or consulted any other health care providers outside of the [address] [place] [address] since your last visit? Include any pap smears or colon screening.  No         Learning Assessment 1/27/2021   PRIMARY LEARNER Patient   BARRIERS PRIMARY LEARNER -   CO-LEARNER CAREGIVER Yes   CO-LEARNER NAME Alaina   PRIMARY LANGUAGE ENGLISH   LEARNER PREFERENCE PRIMARY DEMONSTRATION   ANSWERED BY pt   RELATIONSHIP SELF

## 2021-05-03 NOTE — PROGRESS NOTES
Nancy Patel is a 80 y.o. female who presents to the office today with the following:  Chief Complaint   Patient presents with    Hip Pain - right hip     Patient presents today for hospital follow up and pain - she has c/o pain in her right hip (the opposite of the broken hip), which started a few weeks ago and has been persistently worsening. Bothers her when she rolls over at night and will wake her up from sleep. Current symptoms include worse with weight bearing  aggravated by walking. Associated symptoms: none. Aggravating symptoms: standing, walking, rising after sitting, any weight bearing. She has not tried any heat, which we advised her to try. She has tried Tylenol which has not helped. She is unable to sleep on her opposite side due to pain from the hip fracture, which is slowly improving. She denies any fevers, chills, shortness of breath, difficulty breathing. No trauma to the right side. She is walking with a limp, which likely is impacting the the right hip joint. Does not complain of any right knee pain. Does use a walker to help her ambulate. Hospital notes:  Comminuted intertrochanteric fracture of the left femur  s/p cephalomedullary nailing 03/07  Osteoporosis  -Transferred from [facility] for management of  left hip fracture.  S/p GLF.  -ORIF 3/7 Dr Hanh Segundo  -will be on eliquis due to DVT  -PT OT eval and treat.  DC planning to SNF then outpatient PT  -continue ni with Vit D,  Fracture due to fall and osteoporosis     Acute LLE DVT   + Left calf TTP: duplex US with acute DVT   Transition lovenox to eliquis.  Continue for 3 months     Post op acute blood loss anemia  -no melena noted.   Hg over 7 and asymptomatic.  The drift down is dilution from IVFs     Acute kidney injury, resolved  -prerenal and resolved with IVFs  -hold HCTZ     Urinary tract infection poa   - UA suggestive of UTI; LVQ started on admission, completed 3 doses but UC with staph epi so switched to IV vanco x 3/5 days.  Can transition to oral bactrim to complete course      Hypertension  -started norvasc, discontinued HCTZ     Hypokalemia, repleted.      Depression  Dementia  -wo behavioral issues , MS stable   - Continue with Zoloft, Namenda, Aricept     Vit B12 deficiency:   -continue supplement     Allergies   Allergen Reactions    Pcn [Penicillins] Shortness of Breath       Current Outpatient Medications   Medication Sig    amLODIPine (Norvasc) 5 mg tablet Take 5 mg by mouth daily. Indications: high blood pressure    ferrous gluconate 324 mg (38 mg iron) tablet Take 324 mg by mouth two (2) times daily (with meals). Indications: anemia from inadequate iron    diclofenac (VOLTAREN) 1 % gel Apply  to affected area four (4) times daily.  lidocaine (XYLOCAINE) 4 % topical cream Apply  to affected area three (3) times daily as needed for Pain.  apixaban (Eliquis) 5 mg tablet Take 1 Tab by mouth two (2) times a day for 60 days.  donepeziL (ARICEPT) 5 mg tablet TAKE 1 TABLET EVERY NIGHT    Wheel Chair vinita Wheelchair, 1 unit, Dx: z99.3, M84. 459A    polyethylene glycol (MIRALAX) 17 gram packet Take 1 Packet by mouth daily.  senna-docusate (PERICOLACE) 8.6-50 mg per tablet Take 1 Tab by mouth two (2) times a day.  sertraline (ZOLOFT) 100 mg tablet Take 1 Tab by mouth daily.  memantine (NAMENDA) 10 mg tablet TAKE 1 TABLET EVERY DAY    cholecalciferol, vitamin D3, (Vitamin D3) 50 mcg (2,000 unit) tab Take 1 Tab by mouth daily.  albuterol (PROVENTIL HFA, VENTOLIN HFA, PROAIR HFA) 90 mcg/actuation inhaler Take 2 Puffs by inhalation every four (4) hours as needed for Wheezing.  cyanocobalamin (VITAMIN B-12) 1,000 mcg tablet Take 1,000 mcg by mouth daily.  fluticasone (FLONASE) 50 mcg/actuation nasal spray 2 Sprays by Both Nostrils route daily.  aspirin delayed-release 81 mg tablet Take 1 Tab by mouth daily. No current facility-administered medications for this visit. Past Medical History:   Diagnosis Date    COPD (chronic obstructive pulmonary disease) (HCC)     Dementia (HCC)     GERD (gastroesophageal reflux disease)     HTN (hypertension)     Hypercholesterolemia     MCI (mild cognitive impairment)        Past Surgical History:   Procedure Laterality Date    HX COLONOSCOPY      has had several    HX HIP FRACTURE TX Left 2021    HX MARLEN AND BSO         Social History     Socioeconomic History    Marital status:      Spouse name: Not on file    Number of children: Not on file    Years of education: Not on file    Highest education level: Not on file   Tobacco Use    Smoking status: Former Smoker     Packs/day: 1.00     Years: 33.00     Pack years: 33.00     Types: Cigarettes     Start date: 1952     Quit date: 1985     Years since quittin.3    Smokeless tobacco: Never Used    Tobacco comment: \"Quit many years ago\"   Substance and Sexual Activity    Alcohol use: No    Drug use: No   Other Topics Concern     Service No    Blood Transfusions No    Caffeine Concern No    Occupational Exposure No    Hobby Hazards No    Sleep Concern No    Stress Concern No    Weight Concern No    Special Diet No    Back Care No    Exercise No    Seat Belt Yes    Self-Exams Yes       Social History     Tobacco Use   Smoking Status Former Smoker    Packs/day: 1.00    Years: 33.00    Pack years: 33.00    Types: Cigarettes    Start date: 1952   Baldev Medel Quit date: 1985    Years since quittin.3   Smokeless Tobacco Never Used   Tobacco Comment    \"Quit many years ago\"       Family History   Problem Relation Age of Onset    Cancer Mother     Cancer Father     Stroke Sister     Cancer Sister        Vitals:    21 1124   BP: 138/76   BP 1 Location: Left upper arm   BP Patient Position: At rest   BP Cuff Size: Adult   Pulse: 81   Resp: 20   Temp: (!) 96.2 °F (35.7 °C)   TempSrc: Core   SpO2: 96%   Weight: Comment: Patient Is In A Wheelchair   Height: 5' 7\" (1.702 m)         3 most recent PHQ Screens 5/3/2021   PHQ Not Done -   Little interest or pleasure in doing things Not at all   Feeling down, depressed, irritable, or hopeless Not at all   Total Score PHQ 2 0   Trouble falling or staying asleep, or sleeping too much -   Feeling tired or having little energy -   Poor appetite, weight loss, or overeating -   Feeling bad about yourself - or that you are a failure or have let yourself or your family down -   Trouble concentrating on things such as school, work, reading, or watching TV -   Moving or speaking so slowly that other people could have noticed; or the opposite being so fidgety that others notice -   Thoughts of being better off dead, or hurting yourself in some way -   PHQ 9 Score -   How difficult have these problems made it for you to do your work, take care of your home and get along with others -       ROS:  Denies fever, chills, cough, chest pain or pressure, SOB/MARILYN,  nausea, vomiting, or diarrhea/constipation. No changes in vision or hearing. No new or worsening headaches. No edema, no claudication. Denies wt loss, wt gain, hemoptysis, hematochezia or melena. No dysuria, pyuria, frequency. No polydipsia, polyuria. No weakness, dizziness, lightheadedness, numbness or tingling. No recent changes in moods. Physical Examination:    GENERAL APPEARANCE: NAD, appears stated age, comfortable  VITAL SIGNS:   Visit Vitals  /76 (BP 1 Location: Left upper arm, BP Patient Position: At rest, BP Cuff Size: Adult)   Pulse 81   Temp (!) 96.2 °F (35.7 °C) (Core)   Resp 20   Ht 5' 7\" (1.702 m)   SpO2 96%   BMI 22.82 kg/m²     HEENT: Normocephalic and atraumatic. No scleral icterus. Pupils are equal, round, and reactive to light and accommodation. No conjunctival injection is noted. NECK: Supple. LUNGS: Breath sounds are equal and clear bilaterally. No wheezes, rhonchi, or rales.   HEART: Regular rate and rhythm with normal S1 and S2. No murmurs, gallops, or rubs. EXTREMITIES: No cyanosis, clubbing, or edema. No calf pain to palpation. MUSCULOSKELETAL: Sitting in wheelchair, TTP over the SI joints, lateral hip joint, and anterior hip. No overlying skin changes. Motor intact against resistance, limited 2/2 pain  NEUROLOGIC: CN II-XII intact; no focal neurological deficits. PSYCHIATRIC: The patient is awake, alert  SKIN: Warm, dry, and well perfused. Good turgor. No lesions, nodules or rashes are noted. ASSESSMENT AND PLAN:    Hip pain -advised to try heat, Tylenol scheduled, Voltaren gel. Important to try to keep stretching and to exercise leg is much as possible. Will refer to pain management as they are interested in hip injection. Orders Placed This Encounter    amLODIPine (Norvasc) 5 mg tablet     Sig: Take 5 mg by mouth daily. Indications: high blood pressure    ferrous gluconate 324 mg (38 mg iron) tablet     Sig: Take 324 mg by mouth two (2) times daily (with meals). Indications: anemia from inadequate iron    diclofenac (VOLTAREN) 1 % gel     Sig: Apply  to affected area four (4) times daily. Dispense:  150 g     Refill:  5    lidocaine (XYLOCAINE) 4 % topical cream     Sig: Apply  to affected area three (3) times daily as needed for Pain. Dispense:  90 g     Refill:  5       Patient to return PRN for any new symptoms, call/come to clinic if notices any side effects from medication or any new side effects, and return for regularly scheduled visit    Patient verbalized understanding of and agreement to treatment plan. Patient has been advised to contact practice or seek care if condition persists or worsens. Eun Valencia DO      This documentation was facilitated by voice recognition software and may contain inadvertent typographical errors. Please note that this dictation was completed with [employer], the [employer] voice recognition software.   Quite often unanticipated grammatical, syntax, homophones, and other interpretive errors are inadvertently transcribed by the computer software. Please disregard these errors. Please excuse any errors that have escaped final proofreading.

## 2021-05-04 ENCOUNTER — TELEPHONE (OUTPATIENT)
Dept: FAMILY MEDICINE CLINIC | Age: 86
End: 2021-05-04

## 2021-05-04 DIAGNOSIS — M25.551 RIGHT HIP PAIN: Primary | ICD-10-CM

## 2021-05-04 NOTE — TELEPHONE ENCOUNTER
Pts daughter called asking if Sandra Schwab could call her back in reference to the pain management referral. Zacarias Chatterjee stated that they need her mother to have some tests done first before being seen

## 2021-05-04 NOTE — TELEPHONE ENCOUNTER
Patients daughter  informed she was unable to make an appointment at the pain specialist. She needs x-rays first, and 3 documented visits for this problem. She reports her mother sleeps on her broken hip side and feels like she may tolerate an injection.

## 2021-05-05 ENCOUNTER — TELEPHONE (OUTPATIENT)
Dept: FAMILY MEDICINE CLINIC | Age: 86
End: 2021-05-05

## 2021-05-05 DIAGNOSIS — Z87.81: ICD-10-CM

## 2021-05-05 DIAGNOSIS — M25.552 LEFT HIP PAIN: Primary | ICD-10-CM

## 2021-05-05 NOTE — TELEPHONE ENCOUNTER
----- Message from April M Loraine Kelley sent at 5/5/2021  9:31 AM EDT -----  Regarding: Trinh Jamil, /telephone  General Message/Vendor Calls    Caller's first and last name: Elvia Cespedes       Reason for call: Requested a call back       Callback required yes/no and why: Yes       Best contact number(s): [phone]      Details to clarify the request: Patient daughter would like for sander to give her a call back she would like to know if her mother could have x rays done on both hips and would like a call back to discuss.        April [address] [place]

## 2021-05-06 ENCOUNTER — HOSPITAL ENCOUNTER (OUTPATIENT)
Dept: GENERAL RADIOLOGY | Age: 86
Discharge: HOME | End: 2021-05-06
Payer: MEDICARE

## 2021-05-06 DIAGNOSIS — M25.551 RIGHT HIP PAIN: ICD-10-CM

## 2021-05-06 DIAGNOSIS — M25.552 LEFT HIP PAIN: ICD-10-CM

## 2021-05-06 DIAGNOSIS — Z87.81: ICD-10-CM

## 2021-05-06 PROCEDURE — 73521 X-RAY EXAM HIPS BI 2 VIEWS: CPT

## 2021-05-07 NOTE — PROGRESS NOTES
Spoke with patient, confirmed with 2 identifiers, advised patient of xray results. Pt expressed understanding. Masha got on call, asking about a cortisone shot for pt due to pain. I advised her that she can schedule an appt for that. She states that she will have the daughter call back to set up appt.  KT

## 2021-05-07 NOTE — PROGRESS NOTES
X-rays indicate that the left hip is status post intramedullary nailing and is in correct anatomical alignment. Right hip and left hip both demonstrate mild arthritic changes.

## 2021-05-10 ENCOUNTER — TELEPHONE (OUTPATIENT)
Dept: FAMILY MEDICINE CLINIC | Age: 86
End: 2021-05-10

## 2021-05-10 NOTE — TELEPHONE ENCOUNTER
Daughter says mother (who has dementia) was resulted a test.  Please advise daughter. Also, she asks about the status of the pain management referral.  I am awaiting note.

## 2021-05-10 NOTE — TELEPHONE ENCOUNTER
Risa Mayen DO   5/7/2021  8:12 AM EDT      X-rays indicate that the left hip is status post intramedullary nailing and is in correct anatomical alignment.  Right hip and left hip both demonstrate mild arthritic changes.

## 2021-05-10 NOTE — TELEPHONE ENCOUNTER
Patient caregiver advised of xray results.   Please let us know when notes are done for the referral.

## 2021-05-17 NOTE — TELEPHONE ENCOUNTER
Pts caregiver called and stated that pt took her last amlodipine this morning.  Caregiver is requesting a 30 day supply be sent to [facility] in [place] and the rest sent to [facility]

## 2021-05-18 RX ORDER — AMLODIPINE BESYLATE 5 MG/1
TABLET ORAL
Qty: 90 TAB | Refills: 1 | Status: SHIPPED | OUTPATIENT
Start: 2021-05-18 | End: 2021-08-21

## 2021-06-08 ENCOUNTER — TELEPHONE (OUTPATIENT)
Dept: FAMILY MEDICINE CLINIC | Age: 86
End: 2021-06-08

## 2021-06-08 NOTE — TELEPHONE ENCOUNTER
Daughter would like to drop off urine specimen to see why mother is \"very, very confused\". You have never seen her. VV or TM today or schedule with Dr. Chad Michel tomorrow? She has a few spots left we were leaving for tomorrow requests.

## 2021-06-09 ENCOUNTER — TELEPHONE (OUTPATIENT)
Dept: FAMILY MEDICINE CLINIC | Age: 86
End: 2021-06-09

## 2021-06-09 NOTE — TELEPHONE ENCOUNTER
----- Message from Anitra Hart sent at 6/9/2021 11:32 AM EDT -----  Regarding: HEBERT Trinh/Telephone    General Message/Vendor Calls    Caller's first and last name: Brigid Gramajo, daughter      Reason for call:  Urine sample drop off. Callback required yes/no and why: Yes with questions. Best contact number(s):[phone]      Details to clarify the request: Pt daughter would like to bring pt's urine sample whenever they can capture it due to her being incontinent. That way she definitely has a urine sample to discuss results at Friday's telemed appt.        Anitra Hart

## 2021-06-10 ENCOUNTER — CLINICAL SUPPORT (OUTPATIENT)
Dept: FAMILY MEDICINE CLINIC | Age: 86
End: 2021-06-10
Payer: MEDICARE

## 2021-06-10 DIAGNOSIS — R41.0 CONFUSION: Primary | ICD-10-CM

## 2021-06-10 LAB
BILIRUB UR QL STRIP: NEGATIVE
GLUCOSE UR-MCNC: NEGATIVE MG/DL
KETONES P FAST UR STRIP-MCNC: NORMAL MG/DL
PH UR STRIP: 6.5 [PH] (ref 4.6–8)
PROT UR QL STRIP: NEGATIVE
SP GR UR STRIP: 1.02 (ref 1–1.03)
UA UROBILINOGEN AMB POC: NORMAL (ref 0.2–1)
URINALYSIS CLARITY POC: NORMAL
URINALYSIS COLOR POC: YELLOW
URINE BLOOD POC: NEGATIVE
URINE LEUKOCYTES POC: NEGATIVE
URINE NITRITES POC: NEGATIVE

## 2021-06-10 PROCEDURE — 81003 URINALYSIS AUTO W/O SCOPE: CPT | Performed by: NURSE PRACTITIONER

## 2021-06-10 NOTE — PROGRESS NOTES
A urine specimen was dropped off for a POC UA. Patient is scheduled for a VV with Nicole Lackey on tomorrow for confusion.

## 2021-06-11 ENCOUNTER — VIRTUAL VISIT (OUTPATIENT)
Dept: FAMILY MEDICINE CLINIC | Age: 86
End: 2021-06-11
Payer: MEDICARE

## 2021-06-11 DIAGNOSIS — F01.50 VASCULAR DEMENTIA WITHOUT BEHAVIORAL DISTURBANCE (HCC): Primary | ICD-10-CM

## 2021-06-11 DIAGNOSIS — R82.90 ABNORMAL URINALYSIS: ICD-10-CM

## 2021-06-11 PROCEDURE — G2025 DIS SITE TELE SVCS RHC/FQHC: HCPCS | Performed by: NURSE PRACTITIONER

## 2021-06-11 NOTE — PATIENT INSTRUCTIONS
Dementia: Care Instructions Your Care Instructions Dementia is a loss of mental skills that affects your daily life. It is different than the occasional trouble with memory that is part of aging. You may find it hard to remember things that you feel you should be able to remember. Or you may feel that your mind is just not working as well as usual. 
Finding out that you have dementia is a shock. You may be afraid and worried about how the condition will change your life. Although there is no cure at this time, medicine may slow memory loss and improve thinking for a while. Other medicines may be able to help you sleep or cope with depression and behavior changes. Dementia often gets worse slowly. But it can get worse quickly. As dementia gets worse, it may become harder to do common things that take planning, like making a list and going shopping. Over time, the disease may make it hard for you to take care of yourself. Some people with dementia need others to help care for them. Dementia is different for everyone. You may be able to function well for a long time. In the early stage of the condition, you can do things at home to make life easier and safer. You also can keep doing your hobbies and other activities. Many people find comfort in planning now for their future needs. Follow-up care is a key part of your treatment and safety. Be sure to make and go to all appointments, and call your doctor if you are having problems. It's also a good idea to know your test results and keep a list of the medicines you take. How can you care for yourself at home? · Take your medicines exactly as prescribed. Call your doctor if you think you are having a problem with your medicine. · Eat healthy foods. Eat lots of whole grains, fruits, and vegetables every day. If you are not hungry, try snacks or nutritional drinks such as Boost, Ensure, or Sustacal. 
· If you have problems sleeping: ? Try not to nap too close to your bedtime. ? Exercise regularly. Walking is a good choice. ? Try a glass of warm milk or caffeine-free herbal tea before bed. · Do tasks and activities during the time of day when you feel your best. It may help to develop a daily routine. · Post labels, lists, and sticky notes to help you remember things. Write your activities on a calendar you can easily find. Put your clock where you can easily see it. · Stay active. Take walks in familiar places, or with friends or loved ones. Try to stay active mentally too. Read and work crossword puzzles if you enjoy these activities. · Do not drive unless you can pass an on-road driving test. If you are not sure if you are safe to drive, your state 's license bureau can test you. · Keep a cordless phone and a flashlight with new batteries by your bed. If possible, put a phone in each of the main rooms of your house, or carry a cell phone in case you fall and cannot reach a phone. Or, you can wear a device around your neck or wrist. You push a button that sends a signal for help. Acknowledge your emotions and plan for the future · Talk openly and honestly with your doctor. · Let yourself grieve. It is common to feel angry, scared, frustrated, anxious, or depressed. · Get emotional support from family, friends, a support group, or a counselor experienced in working with people who have dementia. · Ask for help if you need it. · Tell your doctor how you feel. You may feel upset, angry, or worried at times. Many things can cause this, including poor sleep, medicine side effects, confusion, and pain. Your doctor may be able to help you. · Plan for the future. ? Talk to your family and doctor about preparing a living will and other important papers while you can make decisions. These papers tell your doctors how to care for you at the end of your life. ? Consider naming a person to make decisions about your care if you are not able to.  
When should you call for help? Call 911 anytime you think you may need emergency care. For example, call if: 
  · You are lost and do not know whom to call.  
  · You are injured and do not know whom to call. Call your doctor now or seek immediate medical care if: 
  · You are more confused or upset than usual.  
  · You feel like you could hurt yourself because your mind is not working well. Watch closely for changes in your health, and be sure to contact your doctor if you have any problems. Where can you learn more? Go to http://www.gray.com/ Enter O879 in the search box to learn more about \"Dementia: Care Instructions. \" Current as of: September 23, 2020               Content Version: 12.8 © 3588-4550 [employer]. Care instructions adapted under license by [employer] (which disclaims liability or warranty for this information). If you have questions about a medical condition or this instruction, always ask your healthcare professional. [address] any warranty or liability for your use of this information.

## 2021-06-11 NOTE — PROGRESS NOTES
Sadiq Velez is a 80 y.o. female evaluated via telephone on 6/11/2021. Consent:    She and/or health care decision maker is aware that that she may receive a bill for this telephone service, depending on her insurance coverage, and has provided verbal consent to proceed: Yes      Documentation:  I communicated with the patient and/or health care decision maker about AMS. I spoke with Ms. Ramey's caretaker who reports chronic dementia with confusion, worse in the afternoon hours. Her daughter, Rosalio Coleman, was concerned that the evening confusion could be secondary to a UTI. The caretaker denies any urinary symptoms - denies foul smelling urine, urinary frequency, hematuria, or the patient complaining of dysuria. Details of this discussion including any medical advice provided: UA looks okay. Discussed Sundowner's as a part of dementia. Patient is very resistant to taking medication so no changes in prescriptions at this time. Will send urinalysis for culture to evaluate further. Caretaker agrees with the plan of care. I affirm this is a Patient Initiated Episode with an Established Patient who has not had a related appointment within my department in the past 7 days or scheduled within the next 24 hours. ASSESSMENT AND PLAN:       ICD-10-CM ICD-9-CM    1. Vascular dementia without behavioral disturbance ([facility]  F01.50 290.40 CULTURE, URINE   2. Abnormal urinalysis  R82.90 791.9 CULTURE, URINE         Caretaker aware of plan of care and verbalized understanding. Questions answered. RTC PRN.     Sherly Mccarty NP    Total Time: minutes: 5-10 minutes    Note: not billable if this call serves to triage the patient into an appointment for the relevant concern      Sherly Mccarty NP

## 2021-06-11 NOTE — PROGRESS NOTES
Chief Complaint   Patient presents with    Altered mental status     1. Have you been to the ER, urgent care clinic since your last visit? Hospitalized since your last visit? No    2. Have you seen or consulted any other health care providers outside of the [address] [place] [address] since your last visit? Include any pap smears or colon screening. No  Fall Risk Assessment, last 12 mths 6/11/2021   Able to walk? Yes   Fall in past 12 months? 0   Do you feel unsteady? 0   Are you worried about falling 0   Is TUG test greater than 12 seconds? 0   Is the gait abnormal? 0   Number of falls in past 12 months -   Fall with injury? -     Abuse Screening Questionnaire 6/11/2021   Do you ever feel afraid of your partner? N   Are you in a relationship with someone who physically or mentally threatens you? N   Is it safe for you to go home?  Y     3 most recent PHQ Screens 6/11/2021   PHQ Not Done -   Little interest or pleasure in doing things Not at all   Feeling down, depressed, irritable, or hopeless Not at all   Total Score PHQ 2 0   Trouble falling or staying asleep, or sleeping too much -   Feeling tired or having little energy -   Poor appetite, weight loss, or overeating -   Feeling bad about yourself - or that you are a failure or have let yourself or your family down -   Trouble concentrating on things such as school, work, reading, or watching TV -   Moving or speaking so slowly that other people could have noticed; or the opposite being so fidgety that others notice -   Thoughts of being better off dead, or hurting yourself in some way -   PHQ 9 Score -   How difficult have these problems made it for you to do your work, take care of your home and get along with others -     Learning Assessment 6/11/2021   PRIMARY LEARNER Patient   HIGHEST LEVEL OF EDUCATION - PRIMARY LEARNER  GRADUATED HIGH SCHOOL OR GED   BARRIERS PRIMARY LEARNER NONE   CO-LEARNER CAREGIVER No   CO-LEARNER NAME -   PRIMARY LANGUAGE ENGLISH LEARNER PREFERENCE PRIMARY READING   ANSWERED BY Patient   RELATIONSHIP SELF

## 2021-06-14 LAB
BACTERIA SPEC CULT: NORMAL
CC UR VC: NORMAL
SERVICE CMNT-IMP: NORMAL

## 2021-06-14 NOTE — PROGRESS NOTES
Patient verified by stating name and date of birth.  Patient caregiver informed of lab results and states understanding per Acacia Kind

## 2021-06-14 NOTE — PROGRESS NOTES
Please call patient's daughter or caretaker. Patient has dementia. Please inform them that the urine culture was normal. No UTI.

## 2021-07-09 RX ORDER — MEMANTINE HYDROCHLORIDE 10 MG/1
TABLET ORAL
Qty: 90 TABLET | Refills: 1 | Status: SHIPPED | OUTPATIENT
Start: 2021-07-09 | End: 2021-07-26 | Stop reason: SDUPTHER

## 2021-07-26 ENCOUNTER — OFFICE VISIT (OUTPATIENT)
Dept: FAMILY MEDICINE CLINIC | Age: 86
End: 2021-07-26
Payer: MEDICARE

## 2021-07-26 VITALS
OXYGEN SATURATION: 96 % | HEIGHT: 67 IN | WEIGHT: 143.6 LBS | HEART RATE: 65 BPM | TEMPERATURE: 97.6 F | DIASTOLIC BLOOD PRESSURE: 72 MMHG | BODY MASS INDEX: 22.54 KG/M2 | SYSTOLIC BLOOD PRESSURE: 130 MMHG | RESPIRATION RATE: 16 BRPM

## 2021-07-26 DIAGNOSIS — E53.8 VITAMIN B12 DEFICIENCY: ICD-10-CM

## 2021-07-26 DIAGNOSIS — E55.9 VITAMIN D DEFICIENCY: ICD-10-CM

## 2021-07-26 DIAGNOSIS — F01.50 VASCULAR DEMENTIA WITHOUT BEHAVIORAL DISTURBANCE (HCC): Primary | ICD-10-CM

## 2021-07-26 DIAGNOSIS — I10 ESSENTIAL HYPERTENSION: ICD-10-CM

## 2021-07-26 PROCEDURE — 36415 COLL VENOUS BLD VENIPUNCTURE: CPT | Performed by: NURSE PRACTITIONER

## 2021-07-26 PROCEDURE — 99214 OFFICE O/P EST MOD 30 MIN: CPT | Performed by: NURSE PRACTITIONER

## 2021-07-26 RX ORDER — MEMANTINE HYDROCHLORIDE 10 MG/1
TABLET ORAL
Qty: 180 TABLET | Refills: 1 | Status: SHIPPED | OUTPATIENT
Start: 2021-07-26 | End: 2022-02-14

## 2021-07-26 NOTE — PATIENT INSTRUCTIONS
Helping A Person With Dementia: Care Instructions  Your Care Instructions     Dementia is a loss of mental skills that affects daily life. It is different from mild memory loss that occurs with aging. Dementia can cause problems with memory, thinking clearly, and planning. It is different for everyone. But it usually gets worse slowly. Some people who have dementia can function well for a long time. But at some point it may become hard for the person to care for himself or herself. It can be upsetting to learn that a loved one has this condition. You may be afraid and worried about what will happen. You may wonder how you will care for the person. There is no cure for dementia. But medicine may be able to slow memory loss and improve thinking for a while. Other medicines may help with sleep, depression, and behavior changes. Dementia is different for everyone. In some cases, people can function well for a long time. You can help your loved one by making his or her home life easier and safer. You also need to take care of yourself. Caregiving can be stressful. But support is available to help you and give you a break when you need it. The Alzheimer's [employer] offers good information and support. If you are caring for someone with dementia, you can help make life safer and more comfortable. You can also help your loved one make decisions about future care. You may also want to bring up legal and financial issues. These are hard but important conversations to have. Follow-up care is a key part of your loved one's treatment and safety. Be sure to make and go to all appointments, and call your doctor if your loved one is having problems. It's also a good idea to know your loved one's test results and keep a list of the medicines he or she takes. How can you care for your loved one at home? Taking care of the person  · If the person takes medicine for dementia, help him or her take it exactly as prescribed. Call the doctor if you notice any problems with the medicine. · Make a list of the person's medicines. Review it with all of his or her doctors. · Help the person eat a balanced diet. Serve plenty of whole grains, fruits, and vegetables every day. If the person is not hungry at mealtimes, give snacks at midmorning and in the afternoon. Offer drinks such as Boost, Ensure, or Sustacal if the person is losing weight. · Encourage exercise. Walking and other activities may slow the decline of mental ability. Help the person stay active mentally with reading, crossword puzzles, or other hobbies. · Talk openly with the doctor about any behavior changes. Many people who have dementia become easily upset or agitated or feel worried. There are many things that can cause this, such as medicine side effects, confusion, and pain. It may be helpful to:  ? Keep distractions to a minimum. It may also help to keep noise levels low and voices quiet. ? Develop simple daily routines for bathing, dressing, and other activities. And remind your loved one often about upcoming changes to the daily routine, such as trips or appointments. ? Ask what is upsetting him or her. Keep in mind that people who have dementia don't always know why they are upset. · Take steps to help if the person is sundowning. This is the restless behavior and trouble with sleeping that may occur in late afternoon and at night. Try not to let the person nap during the day. Offer a glass of warm milk or caffeine-free tea before bedtime. · Be patient. A task may take the person longer than it used to. · For as long as he or she is able, allow your loved one to make decisions about activities, food, clothing, and other choices. Let him or her be independent, even if tasks take more time or are not done perfectly. Tailor tasks to the person's abilities. For example, if cooking is no longer safe, ask for other help.  Your loved one can help set the table, or make simple dishes such as a salad. When the person needs help, offer it gently. Staying safe  · Make your home (or your loved one's home) safe. Tack down rugs, and put no-slip tape in the tub. Install handrails, and put safety switches on stoves and appliances. Keep rooms free of clutter. Make sure walkways around furniture are clear. Do not move furniture around, because the person may become confused. · Use locks on doors and cupboards. Lock up knives, scissors, medicines, cleaning supplies, and other dangerous things. · Do not let the person drive or cook if he or she can't do it safely. A person with dementia should not drive unless he or she is able to pass an on-road driving test. Your state 's license bureau can do a driving test if there is any question. · Get medical alert jewelry for the person so that you can be contacted if he or she wanders away. If possible, provide a safe place for wandering, such as an enclosed yard or garden. Taking care of yourself  · Ask your doctor about support groups and other resources in your area. · Take care of your health. Be sure to eat healthy foods and get enough rest and exercise. · Take time for yourself. Respite services provide someone to stay with the person for a short time while you get out of the house for a few hours. · Make time for an activity that you enjoy. Read, listen to music, paint, do crafts, or play an instrument, even if it's only for a few minutes a day. · Spend time with family, friends, and others in your support system. When should you call for help? Call 911 anytime you think the person may need emergency care. For example, call if:    · The person who has dementia wanders away and you can't find him or her.     · The person who has dementia is seriously injured.    Call the doctor now or seek immediate medical care if:    · The person suddenly sees things that are not there (hallucinates).     · The person has a sudden change in his or her behavior. Watch closely for changes in the person's health, and be sure to contact the doctor if:    · The person has symptoms that could cause injury.     · The person has problems with his or her medicine.     · You need more information to care for a person with dementia.     · You need respite care so you can take a break. Where can you learn more? Go to http://www.gray.com/  Enter B382 in the search box to learn more about \"Helping A Person With Dementia: Care Instructions. \"  Current as of: September 23, 2020               Content Version: 12.8  © 2006-2021 [employer]. Care instructions adapted under license by [employer] (which disclaims liability or warranty for this information). If you have questions about a medical condition or this instruction, always ask your healthcare professional. [address] any warranty or liability for your use of this information.

## 2021-07-26 NOTE — PROGRESS NOTES
Chief Complaint   Patient presents with    Medication Evaluation     dementia       HPI:    Marycarmen Hwang is a 80 y.o. female who presents today for her check up accompanied by her daughter, Bridget Lowry. Ms. Kermitt Habermann lives alone but has a 24/7 caregiver in the home. Dementia: Progressive, worse in the afternoon. The daughter reports increased confusion, fatigue, irritability. No neurologist. Decreased appetite. Caregiver provides meals, bathing, dressing. Incontinent of urine and stool. Ambulatory with a walker but has a shuffling gait. No falls. HTN: On amlodipine. Follows with Dr. Luis Rosales Q6 months. Generalized weakness: Has improved over the past few weeks. New issues: As above. Allergies   Allergen Reactions    Pcn [Penicillins] Shortness of Breath       Current Outpatient Medications   Medication Sig    memantine (NAMENDA) 10 mg tablet TAKE 1 TABLET BID    amLODIPine (NORVASC) 5 mg tablet TAKE 1 TABLET BY MOUTH EVERY DAY    donepeziL (ARICEPT) 5 mg tablet TAKE 1 TABLET EVERY NIGHT    Wheel Chair vinita Wheelchair, 1 unit, Dx: z99.3, M84. 459A    senna-docusate (PERICOLACE) 8.6-50 mg per tablet Take 1 Tab by mouth two (2) times a day.  sertraline (ZOLOFT) 100 mg tablet Take 1 Tab by mouth daily.  cholecalciferol, vitamin D3, (Vitamin D3) 50 mcg (2,000 unit) tab Take 1 Tab by mouth daily.  aspirin delayed-release 81 mg tablet Take 1 Tab by mouth daily.  albuterol (PROVENTIL HFA, VENTOLIN HFA, PROAIR HFA) 90 mcg/actuation inhaler Take 2 Puffs by inhalation every four (4) hours as needed for Wheezing.  cyanocobalamin (VITAMIN B-12) 1,000 mcg tablet Take 1,000 mcg by mouth daily.  fluticasone (FLONASE) 50 mcg/actuation nasal spray 2 Sprays by Both Nostrils route daily. No current facility-administered medications for this visit.        Past Medical History:   Diagnosis Date    COPD (chronic obstructive pulmonary disease) (HCC)     Dementia (HCC)     GERD (gastroesophageal reflux disease)     HTN (hypertension)     Hypercholesterolemia     MCI (mild cognitive impairment)        Family History   Problem Relation Age of Onset    Cancer Mother     Cancer Father     Stroke Sister     Cancer Sister        ROS:  Denies fever, chills, cough, chest pain, SOB,  nausea, vomiting, diarrhea, dysuria. Denies rashes, wounds, arthralgias, + weakness, numbness, visual changes, depression, + wt loss, wt gain, hemoptysis, hematochezia or melena. Patient is not experiencing chest pain radiating to the jaw and/or down the arms. Physical Examination:    /72 (BP 1 Location: Right arm, BP Patient Position: Sitting, BP Cuff Size: Adult)   Pulse 65   Temp 97.6 °F (36.4 °C) (Temporal)   Resp 16   Ht 5' 7\" (1.702 m)   Wt 143 lb 9.6 oz (65.1 kg)   SpO2 96%   BMI 22.49 kg/m²     Wt Readings from Last 3 Encounters:   07/26/21 143 lb 9.6 oz (65.1 kg)   03/10/21 145 lb 11.6 oz (66.1 kg)   03/06/21 145 lb (65.8 kg)     Constitutional: WDWN Female in no acute distress  HENT:  NC/AT, TMs pearly gray, [place], hearing aids noted bilaterally. OP: clear  EYES: EOMI, PERRL  Neck:  Supple, no JVD, mass or bruit. No thyromegaly. Respiratory:  Respirations even and unlabored without use of accessory muscles, CTA throughout without wheezes, rales, rubs or rhonchi. Symmetrical chest expansion. Cardiac: RRR no clicks, gallops, or rubs  Musculoskeletal:  No cyanosis, clubbing or edema of extremities. Generalized weakness  Neurologic: In a wheelchair, CN 2-12 grossly intact. Skin: intact and warm to the touch, no rash   Lymphadenopathy: no cervical or supraclavicular nodes  Psych: Pleasantly confused. ASSESSMENT AND PLAN:       ICD-10-CM ICD-9-CM    1. Vascular dementia without behavioral disturbance ([facility]  F01.50 290.40 memantine (NAMENDA) 10 mg tablet   2.  Essential hypertension  I10 401.9 COLLECTION VENOUS BLOOD,VENIPUNCTURE      CBC WITH AUTOMATED DIFF      METABOLIC PANEL, COMPREHENSIVE TSH 3RD GENERATION      TSH 3RD GENERATION      METABOLIC PANEL, COMPREHENSIVE      CBC WITH AUTOMATED DIFF      COLLECTION VENOUS BLOOD,VENIPUNCTURE   3. Vitamin B12 deficiency  E53.8 266.2 COLLECTION VENOUS BLOOD,VENIPUNCTURE      VITAMIN B12      VITAMIN B12      COLLECTION VENOUS BLOOD,VENIPUNCTURE   4. Vitamin D deficiency  E55.9 268.9 COLLECTION VENOUS BLOOD,VENIPUNCTURE      VITAMIN D, 25 HYDROXY      VITAMIN D, 25 HYDROXY      COLLECTION VENOUS BLOOD,VENIPUNCTURE     Dementia is progressing. Daughter still feels like mother is safe in the home with 24/7 care. Discussed with the patient the importance of adequate nutrition, increased hydration. Continue to minimize fall risks. Chronic labs today. Increase Namenda to 10 mg BID. Consider increasing Aricept as well in a few weeks. COVID vaccine up to date but patient does not have her card available. Patient aware of plan of care and verbalized understanding. Questions answered. RTC 3 months, or sooner if needed.     Azar Brannon NP

## 2021-07-27 LAB
25(OH)D3 SERPL-MCNC: 23.5 NG/ML (ref 30–100)
ALBUMIN SERPL-MCNC: 3.6 G/DL (ref 3.5–5)
ALBUMIN/GLOB SERPL: 1.1 {RATIO} (ref 1.1–2.2)
ALP SERPL-CCNC: 95 U/L (ref 45–117)
ALT SERPL-CCNC: 25 U/L (ref 12–78)
ANION GAP SERPL CALC-SCNC: 11 MMOL/L (ref 5–15)
AST SERPL-CCNC: 29 U/L (ref 15–37)
BASOPHILS # BLD: 0.1 K/UL (ref 0–0.1)
BASOPHILS NFR BLD: 2 % (ref 0–1)
BILIRUB SERPL-MCNC: 0.5 MG/DL (ref 0.2–1)
BUN SERPL-MCNC: 16 MG/DL (ref 6–20)
BUN/CREAT SERPL: 16 (ref 12–20)
CALCIUM SERPL-MCNC: 9.1 MG/DL (ref 8.5–10.1)
CHLORIDE SERPL-SCNC: 108 MMOL/L (ref 97–108)
CO2 SERPL-SCNC: 26 MMOL/L (ref 21–32)
CREAT SERPL-MCNC: 0.99 MG/DL (ref 0.55–1.02)
DIFFERENTIAL METHOD BLD: ABNORMAL
EOSINOPHIL # BLD: 0.2 K/UL (ref 0–0.4)
EOSINOPHIL NFR BLD: 3 % (ref 0–7)
ERYTHROCYTE [DISTWIDTH] IN BLOOD BY AUTOMATED COUNT: 13.4 % (ref 11.5–14.5)
GLOBULIN SER CALC-MCNC: 3.3 G/DL (ref 2–4)
GLUCOSE SERPL-MCNC: 75 MG/DL (ref 65–100)
HCT VFR BLD AUTO: 40 % (ref 35–47)
HGB BLD-MCNC: 12.8 G/DL (ref 11.5–16)
IMM GRANULOCYTES # BLD AUTO: 0 K/UL (ref 0–0.04)
IMM GRANULOCYTES NFR BLD AUTO: 0 % (ref 0–0.5)
LYMPHOCYTES # BLD: 1.9 K/UL (ref 0.8–3.5)
LYMPHOCYTES NFR BLD: 29 % (ref 12–49)
MCH RBC QN AUTO: 30.5 PG (ref 26–34)
MCHC RBC AUTO-ENTMCNC: 32 G/DL (ref 30–36.5)
MCV RBC AUTO: 95.2 FL (ref 80–99)
MONOCYTES # BLD: 0.6 K/UL (ref 0–1)
MONOCYTES NFR BLD: 9 % (ref 5–13)
NEUTS SEG # BLD: 3.8 K/UL (ref 1.8–8)
NEUTS SEG NFR BLD: 57 % (ref 32–75)
NRBC # BLD: 0 K/UL (ref 0–0.01)
NRBC BLD-RTO: 0 PER 100 WBC
PLATELET # BLD AUTO: 283 K/UL (ref 150–400)
PMV BLD AUTO: 10.2 FL (ref 8.9–12.9)
POTASSIUM SERPL-SCNC: 3.9 MMOL/L (ref 3.5–5.1)
PROT SERPL-MCNC: 6.9 G/DL (ref 6.4–8.2)
RBC # BLD AUTO: 4.2 M/UL (ref 3.8–5.2)
SODIUM SERPL-SCNC: 145 MMOL/L (ref 136–145)
TSH SERPL DL<=0.05 MIU/L-ACNC: 1.64 UIU/ML (ref 0.36–3.74)
VIT B12 SERPL-MCNC: 1386 PG/ML (ref 193–986)
WBC # BLD AUTO: 6.7 K/UL (ref 3.6–11)

## 2021-07-27 RX ORDER — LANOLIN ALCOHOL/MO/W.PET/CERES
1000 CREAM (GRAM) TOPICAL
Qty: 30 TABLET | Refills: 0
Start: 2021-07-28 | End: 2022-06-27

## 2021-07-27 NOTE — PROGRESS NOTES
Please call daughter. Patient's thyroid function is normal. Her B12 level is slightly elevated. I recommend decreasing her supplement to three days per week. Her vitamin D is slightly low. Increase her supplement to 5000 units per day. This is available OTC. Sodium, potassium, sugar, liver, kidneys were perfect. No anemia. Blood tests look the best they've been in several months.

## 2021-07-27 NOTE — PROGRESS NOTES
Patient verified by stating name and date of birth.  Patient informed of lab results and states understanding per Bennett Mercado

## 2021-08-04 ENCOUNTER — OFFICE VISIT (OUTPATIENT)
Dept: CARDIOLOGY CLINIC | Age: 86
End: 2021-08-04
Payer: MEDICARE

## 2021-08-04 VITALS
SYSTOLIC BLOOD PRESSURE: 166 MMHG | TEMPERATURE: 98 F | RESPIRATION RATE: 18 BRPM | HEART RATE: 64 BPM | DIASTOLIC BLOOD PRESSURE: 62 MMHG | BODY MASS INDEX: 22.49 KG/M2 | HEIGHT: 67 IN | OXYGEN SATURATION: 97 %

## 2021-08-04 DIAGNOSIS — I49.5 SICK SINUS SYNDROME (HCC): ICD-10-CM

## 2021-08-04 DIAGNOSIS — I10 ESSENTIAL HYPERTENSION: ICD-10-CM

## 2021-08-04 DIAGNOSIS — J41.0 SIMPLE CHRONIC BRONCHITIS (HCC): ICD-10-CM

## 2021-08-04 DIAGNOSIS — N18.30 STAGE 3 CHRONIC KIDNEY DISEASE, UNSPECIFIED WHETHER STAGE 3A OR 3B CKD (HCC): ICD-10-CM

## 2021-08-04 DIAGNOSIS — F01.50 VASCULAR DEMENTIA WITHOUT BEHAVIORAL DISTURBANCE (HCC): ICD-10-CM

## 2021-08-04 DIAGNOSIS — I48.0 PAF (PAROXYSMAL ATRIAL FIBRILLATION) (HCC): Primary | ICD-10-CM

## 2021-08-04 PROCEDURE — 3288F FALL RISK ASSESSMENT DOCD: CPT | Performed by: INTERNAL MEDICINE

## 2021-08-04 PROCEDURE — G8427 DOCREV CUR MEDS BY ELIG CLIN: HCPCS | Performed by: INTERNAL MEDICINE

## 2021-08-04 PROCEDURE — G8510 SCR DEP NEG, NO PLAN REQD: HCPCS | Performed by: INTERNAL MEDICINE

## 2021-08-04 PROCEDURE — 1090F PRES/ABSN URINE INCON ASSESS: CPT | Performed by: INTERNAL MEDICINE

## 2021-08-04 PROCEDURE — G8536 NO DOC ELDER MAL SCRN: HCPCS | Performed by: INTERNAL MEDICINE

## 2021-08-04 PROCEDURE — 99214 OFFICE O/P EST MOD 30 MIN: CPT | Performed by: INTERNAL MEDICINE

## 2021-08-04 PROCEDURE — G8420 CALC BMI NORM PARAMETERS: HCPCS | Performed by: INTERNAL MEDICINE

## 2021-08-04 PROCEDURE — 1100F PTFALLS ASSESS-DOCD GE2>/YR: CPT | Performed by: INTERNAL MEDICINE

## 2021-08-04 NOTE — PROGRESS NOTES
Identified pt with two pt identifiers(name and ). Reviewed record in preparation for visit and have obtained necessary documentation. Chief Complaint   Patient presents with    Hypertension     6 month follow up      Vitals:    21 1346   BP: (!) 166/62   Pulse: 64   Resp: 18   Temp: 98 °F (36.7 °C)   TempSrc: Temporal   SpO2: 97%   Height: 5' 7\" (1.702 m)   PainSc:   0 - No pain       Medications reviewed/approved by Dr. Rommel Hilario. Health Maintenance Review: Patient reminded of \"due or due soon\" health maintenance. I have asked the patient to contact his/her primary care provider (PCP) for follow-up on his/her health maintenance. Coordination of Care Questionnaire:  :   1) Have you been to an emergency room, urgent care, or hospitalized since your last visit? If yes, where when, and reason for visit? no      2. Have seen or consulted any other health care provider since your last visit? If yes, where when, and reason for visit? no    Patient is accompanied by caregiver I have received verbal consent from Bo Villanueva to discuss any/all medical information while they are present in the room.

## 2021-08-04 NOTE — PROGRESS NOTES
Migdalia Reynolds is a [facility] y.o. female is here for routine f/u. Seen previously with dizziness, bradycardia, some falling episodes. Hx hypertension, ASCVD, COPD, CKD, vascular dementia followed by Dr. Candace Tervizo. Echo 10/19 with LVEF 60-65, AV sclerosis, mild MR, mild TR. Holter 11/7/19 with NSR , avg 58, PAC's/PVC's, multiple runs of PAT vs PAF up to 34 beats. Due to multiple falls, not felt to be good candidate for [facility], on ASA only. Some decline, memory issues. Recent swallowing problems as noted. No current CV sx or complaints. .The patient denies chest pain/ shortness of breath, orthopnea, PND, LE edema, palpitations, syncope,  or fatigue.        Patient Active Problem List    Diagnosis Date Noted    Hip fracture ([facility] [address] 03/06/2021    Dry skin dermatitis 08/06/2019    Neck pain 07/23/2019    CKD (chronic kidney disease) stage 3, GFR 30-59 ml/min ([address] 06/18/2019    Vascular dementia without behavioral disturbance ([address] 05/19/2019    Dizziness 01/31/2019    Acute cystitis without hematuria 10/24/2018    Bradycardia 10/24/2018    Age-related osteoporosis without current pathological fracture 07/25/2018    COPD (chronic obstructive pulmonary disease) ([address]     HTN (hypertension)     B12 deficiency 01/15/2014    MCI (mild cognitive impairment) 09/17/2013    White matter disease 09/17/2013      Alexandria Mills NP  Past Medical History:   Diagnosis Date    COPD (chronic obstructive pulmonary disease) ([address]     Dementia ([address]     GERD (gastroesophageal reflux disease)     HTN (hypertension)     Hypercholesterolemia     MCI (mild cognitive impairment)       Past Surgical History:   Procedure Laterality Date    HX COLONOSCOPY  6/14    has had several    HX HIP FRACTURE TX Left 03/06/2021    HX MARLEN AND BSO       Allergies   Allergen Reactions    Pcn [Penicillins] Shortness of Breath      Family History   Problem Relation Age of Onset    Cancer Mother     Cancer Father    Ted Jacobs Stroke Sister     Cancer Sister       Social History     Socioeconomic History    Marital status:      Spouse name: Not on file    Number of children: Not on file    Years of education: Not on file    Highest education level: Not on file   Occupational History    Not on file   Tobacco Use    Smoking status: Former Smoker     Packs/day: 1.00     Years: 33.00     Pack years: 33.00     Types: Cigarettes     Start date: 1952     Quit date: 1985     Years since quittin.6    Smokeless tobacco: Never Used    Tobacco comment: \"Quit many years ago\"   Vaping Use    Vaping Use: Never used   Substance and Sexual Activity    Alcohol use: No    Drug use: No    Sexual activity: Not on file   Other Topics Concern     Service No    Blood Transfusions No    Caffeine Concern No    Occupational Exposure No    Hobby Hazards No    Sleep Concern No    Stress Concern No    Weight Concern No    Special Diet No    Back Care No    Exercise No    Bike Helmet Not Asked    Seat Belt Yes    Self-Exams Yes   Social History Narrative    Not on file     Social Determinants of Health     Financial Resource Strain:     Difficulty of Paying Living Expenses:    Food Insecurity:     Worried About Running Out of Food in the Last Year:     Ran Out of Food in the Last Year:    Transportation Needs:     Lack of Transportation (Medical):      Lack of Transportation (Non-Medical):    Physical Activity:     Days of Exercise per Week:     Minutes of Exercise per Session:    Stress:     Feeling of Stress :    Social Connections:     Frequency of Communication with Friends and Family:     Frequency of Social Gatherings with Friends and Family:     Attends Church Services:     Active Member of Clubs or Organizations:     Attends Club or Organization Meetings:     Marital Status:    Intimate Partner Violence:     Fear of Current or Ex-Partner:     Emotionally Abused:     Physically Abused:  Sexually Abused:       Current Outpatient Medications   Medication Sig    cyanocobalamin (Vitamin B-12) 1,000 mcg tablet Take 1 Tablet by mouth every Monday, Wednesday, Friday.  memantine (NAMENDA) 10 mg tablet TAKE 1 TABLET BID (Patient taking differently: daily. TAKE 1 TABLET BID)    amLODIPine (NORVASC) 5 mg tablet TAKE 1 TABLET BY MOUTH EVERY DAY    donepeziL (ARICEPT) 5 mg tablet TAKE 1 TABLET EVERY NIGHT    Wheel Chair vinita Wheelchair, 1 unit, Dx: z99.3, M84. 459A    sertraline (ZOLOFT) 100 mg tablet Take 1 Tab by mouth daily.  cholecalciferol, vitamin D3, (Vitamin D3) 50 mcg (2,000 unit) tab Take 1 Tab by mouth daily.  aspirin delayed-release 81 mg tablet Take 1 Tab by mouth daily.  albuterol (PROVENTIL HFA, VENTOLIN HFA, PROAIR HFA) 90 mcg/actuation inhaler Take 2 Puffs by inhalation every four (4) hours as needed for Wheezing.  fluticasone (FLONASE) 50 mcg/actuation nasal spray 2 Sprays by Both Nostrils route daily.  senna-docusate (PERICOLACE) 8.6-50 mg per tablet Take 1 Tab by mouth two (2) times a day. (Patient not taking: Reported on 8/4/2021)     No current facility-administered medications for this visit. Review of Symptoms:    CONST  No weight change. No fever, chills, sweats    ENT No visual changes, URI sx, sore throat    CV  See HPI   RESP  No cough, or sputum, wheezing. Also see HPI   GI  No abdominal pain or change in bowel habits. No heartburn or dysphagia. No melena or rectal bleeding.   No dysuria, urgency, frequency, hematuria   MSKEL  No joint pain, swelling. No muscle pain. SKIN  No rash or lesions. NEURO  No headache, syncope, or seizure. No weakness, loss of sensation, or paresthesias. PSYCH  No low mood or depression  No anxiety. HE/LYMPH  No easy bruising, abnormal bleeding, or enlarged glands.         Physical ExamPhysical Exam:    Visit Vitals  BP (!) 166/62 (BP 1 Location: Left upper arm, BP Patient Position: Sitting, BP Cuff Size: Adult)   Pulse 64   Temp 98 °F (36.7 °C) (Temporal)   Resp 18   Ht 5' 7\" (1.702 m)   SpO2 97% Comment: ra   BMI 22.49 kg/m²     Gen: NAD  HEENT:  PERRL, throat clear  Neck: no adenopathy, no thyromegaly, no JVD   Heart:  Regular,Nl S1S2,  no murmur, gallop or rub. Lungs:  clear  Abdomen:   Soft, non-tender, bowel sounds are active.    Extremities:  No edema  Pulse: symmetric  Neuro: A&O times 3, No focal neuro deficits    Cardiographics    ECG: from 3/7/21--NSR, NSST    Labs:   Lab Results   Component Value Date/Time    Sodium 145 07/26/2021 08:52 PM    Sodium 142 03/16/2021 12:00 PM    Sodium 144 03/15/2021 09:01 AM    Sodium 138 03/12/2021 02:18 AM    Sodium 140 03/11/2021 03:59 AM    Potassium 3.9 07/26/2021 08:52 PM    Potassium 4.0 03/16/2021 12:00 PM    Potassium 4.0 03/15/2021 09:01 AM    Potassium 3.6 03/12/2021 02:18 AM    Potassium 3.4 (L) 03/11/2021 03:59 AM    Chloride 108 07/26/2021 08:52 PM    Chloride 106 03/16/2021 12:00 PM    Chloride 106 03/15/2021 09:01 AM    Chloride 111 (H) 03/12/2021 02:18 AM    Chloride 112 (H) 03/11/2021 03:59 AM    CO2 26 07/26/2021 08:52 PM    CO2 22 03/16/2021 12:00 PM    CO2 25 03/15/2021 09:01 AM    CO2 21 03/12/2021 02:18 AM    CO2 22 03/11/2021 03:59 AM    Anion gap 11 07/26/2021 08:52 PM    Anion gap 14 03/16/2021 12:00 PM    Anion gap 13 03/15/2021 09:01 AM    Anion gap 6 03/12/2021 02:18 AM    Anion gap 6 03/11/2021 03:59 AM    Glucose 75 07/26/2021 08:52 PM    Glucose 110 (H) 03/16/2021 12:00 PM    Glucose 116 (H) 03/15/2021 09:01 AM    Glucose 150 (H) 03/12/2021 02:18 AM    Glucose 112 (H) 03/11/2021 03:59 AM    BUN 16 07/26/2021 08:52 PM    BUN 25 (H) 03/16/2021 12:00 PM    BUN 19 03/15/2021 09:01 AM    BUN 15 03/12/2021 02:18 AM    BUN 12 03/11/2021 03:59 AM    Creatinine 0.99 07/26/2021 08:52 PM    Creatinine 1.05 (H) 03/16/2021 12:00 PM    Creatinine 1.15 (H) 03/15/2021 09:01 AM    Creatinine 0.70 03/12/2021 02:18 AM    Creatinine 0.64 03/11/2021 03:59 AM    BUN/Creatinine ratio 16 07/26/2021 08:52 PM    BUN/Creatinine ratio 24 (H) 03/16/2021 12:00 PM    BUN/Creatinine ratio 17 03/15/2021 09:01 AM    BUN/Creatinine ratio 21 (H) 03/12/2021 02:18 AM    BUN/Creatinine ratio 19 03/11/2021 03:59 AM    GFR est AA >60 07/26/2021 08:52 PM    GFR est AA 60 (L) 03/16/2021 12:00 PM    GFR est AA 54 (L) 03/15/2021 09:01 AM    GFR est AA >60 03/12/2021 02:18 AM    GFR est AA >60 03/11/2021 03:59 AM    GFR est non-AA 53 (L) 07/26/2021 08:52 PM    GFR est non-AA 49 (L) 03/16/2021 12:00 PM    GFR est non-AA 45 (L) 03/15/2021 09:01 AM    GFR est non-AA >60 03/12/2021 02:18 AM    GFR est non-AA >60 03/11/2021 03:59 AM    Calcium 9.1 07/26/2021 08:52 PM    Calcium 8.8 03/16/2021 12:00 PM    Calcium 8.5 03/15/2021 09:01 AM    Calcium 8.4 (L) 03/12/2021 02:18 AM    Calcium 8.3 (L) 03/11/2021 03:59 AM    Bilirubin, total 0.5 07/26/2021 08:52 PM    Bilirubin, total 0.7 03/15/2021 09:01 AM    Bilirubin, total 0.8 03/06/2021 02:04 PM    Bilirubin, total 0.3 10/25/2019 11:44 AM    Bilirubin, total 0.2 05/17/2019 11:46 AM    Alk. phosphatase 95 07/26/2021 08:52 PM    Alk. phosphatase 113 03/15/2021 09:01 AM    Alk. phosphatase 60 03/06/2021 02:04 PM    Alk. phosphatase 64 10/25/2019 11:44 AM    Alk.  phosphatase 61 05/17/2019 11:46 AM    Protein, total 6.9 07/26/2021 08:52 PM    Protein, total 5.7 (L) 03/15/2021 09:01 AM    Protein, total 6.7 03/06/2021 02:04 PM    Protein, total 6.7 10/25/2019 11:44 AM    Protein, total 6.8 05/17/2019 11:46 AM    Albumin 3.6 07/26/2021 08:52 PM    Albumin 2.7 (L) 03/15/2021 09:01 AM    Albumin 3.4 (L) 03/06/2021 02:04 PM    Albumin 4.1 10/25/2019 11:44 AM    Albumin 4.3 05/17/2019 11:46 AM    Globulin 3.3 07/26/2021 08:52 PM    Globulin 3.0 03/15/2021 09:01 AM    Globulin 3.3 03/06/2021 02:04 PM    Globulin 3.6 09/24/2018 06:00 PM    A-G Ratio 1.1 07/26/2021 08:52 PM    A-G Ratio 0.9 (L) 03/15/2021 09:01 AM    A-G Ratio 1.0 (L) 03/06/2021 02:04 PM    A-G Ratio 1.6 10/25/2019 11:44 AM    A-G Ratio 1.7 05/17/2019 11:46 AM    ALT (SGPT) 25 07/26/2021 08:52 PM    ALT (SGPT) 41 03/15/2021 09:01 AM    ALT (SGPT) 26 03/06/2021 02:04 PM    ALT (SGPT) 11 10/25/2019 11:44 AM    ALT (SGPT) 12 05/17/2019 11:46 AM     No results found for: CPK, CPKX, CPX  Lab Results   Component Value Date/Time    Cholesterol, total 175 05/17/2019 11:46 AM    Cholesterol, total 158 11/01/2016 02:39 PM    Cholesterol, total 175 12/16/2015 01:22 PM    HDL Cholesterol 51 05/17/2019 11:46 AM    HDL Cholesterol 46 11/01/2016 02:39 PM    HDL Cholesterol 61 12/16/2015 01:22 PM    LDL, calculated 105 (H) 05/17/2019 11:46 AM    LDL, calculated 80 11/01/2016 02:39 PM    LDL, calculated 100 (H) 12/16/2015 01:22 PM    Triglyceride 93 05/17/2019 11:46 AM    Triglyceride 162 (H) 11/01/2016 02:39 PM    Triglyceride 72 12/16/2015 01:22 PM     No results found for this or any previous visit. Assessment:         Patient Active Problem List    Diagnosis Date Noted    Hip fracture ([facility] [address] 03/06/2021    Dry skin dermatitis 08/06/2019    Neck pain 07/23/2019    CKD (chronic kidney disease) stage 3, GFR 30-59 ml/min ([address] 06/18/2019    Vascular dementia without behavioral disturbance ([address] 05/19/2019    Dizziness 01/31/2019    Acute cystitis without hematuria 10/24/2018    Bradycardia 10/24/2018    Age-related osteoporosis without current pathological fracture 07/25/2018    COPD (chronic obstructive pulmonary disease) ([address]     HTN (hypertension)     B12 deficiency 01/15/2014    MCI (mild cognitive impairment) 09/17/2013    White matter disease 09/17/2013     Seen previously with dizziness, bradycardia, some falling episodes. Hx hypertension, ASCVD, COPD, CKD, vascular dementia followed by Dr. Candace Trevizo. Echo 10/19 with LVEF 60-65, AV sclerosis, mild MR, mild TR. Holter 11/7/19 with NSR , avg 58, PAC's/PVC's, multiple runs of PAT vs PAF up to 34 beats.  Due to multiple falls, not felt to be good candidate [place] [facility]. Some decline, memory issues. Recent swallowing problems as noted. No current CV sx or complaints. Plan:     Doing well with no adverse cardiac symptoms. Lipids and labs followed by PCP. Continue current care and f/u in 6 months.     Helen Sultana MD

## 2021-09-12 RX ORDER — DONEPEZIL HYDROCHLORIDE 5 MG/1
TABLET, FILM COATED ORAL
Qty: 90 TABLET | Refills: 1 | Status: SHIPPED | OUTPATIENT
Start: 2021-09-12 | End: 2022-03-07 | Stop reason: SDUPTHER

## 2021-09-13 ENCOUNTER — OFFICE VISIT (OUTPATIENT)
Dept: FAMILY MEDICINE CLINIC | Age: 86
End: 2021-09-13
Payer: MEDICARE

## 2021-09-13 VITALS
HEIGHT: 67 IN | DIASTOLIC BLOOD PRESSURE: 66 MMHG | OXYGEN SATURATION: 97 % | BODY MASS INDEX: 21.03 KG/M2 | SYSTOLIC BLOOD PRESSURE: 130 MMHG | WEIGHT: 134 LBS | TEMPERATURE: 97.4 F | RESPIRATION RATE: 22 BRPM | HEART RATE: 72 BPM

## 2021-09-13 DIAGNOSIS — Z23 ENCOUNTER FOR IMMUNIZATION: ICD-10-CM

## 2021-09-13 DIAGNOSIS — F03.911 AGITATION DUE TO DEMENTIA: Primary | ICD-10-CM

## 2021-09-13 DIAGNOSIS — G30.1 LATE ONSET ALZHEIMER'S DEMENTIA WITH BEHAVIORAL DISTURBANCE (HCC): ICD-10-CM

## 2021-09-13 DIAGNOSIS — F02.818 LATE ONSET ALZHEIMER'S DEMENTIA WITH BEHAVIORAL DISTURBANCE (HCC): ICD-10-CM

## 2021-09-13 DIAGNOSIS — R63.4 WEIGHT LOSS: ICD-10-CM

## 2021-09-13 PROCEDURE — 90694 VACC AIIV4 NO PRSRV 0.5ML IM: CPT | Performed by: NURSE PRACTITIONER

## 2021-09-13 PROCEDURE — 99214 OFFICE O/P EST MOD 30 MIN: CPT | Performed by: NURSE PRACTITIONER

## 2021-09-13 PROCEDURE — G0008 ADMIN INFLUENZA VIRUS VAC: HCPCS | Performed by: NURSE PRACTITIONER

## 2021-09-13 NOTE — PROGRESS NOTES
Vaccine updated. Flu shot given Lot Rosanne Dubon [id number] deltoid  Denies former reactions to shot and any allergies to chicken eggs or products. 1. Have you been to the ER, urgent care clinic since your last visit? Hospitalized since your last visit? No    2. Have you seen or consulted any other health care providers outside of the [address] [place] [address] since your last visit? Include any pap smears or colon screening.  No      Chief Complaint   Patient presents with    Agitation         Visit Vitals  /66 (BP 1 Location: Right upper arm, BP Patient Position: Sitting, BP Cuff Size: Adult)   Pulse 72   Temp 97.4 °F (36.3 °C) (Temporal)   Resp 22   Ht 5' 7\" (1.702 m)   Wt 134 lb (60.8 kg)   SpO2 97%   BMI 20.99 kg/m²       Pain Scale: 0 - No pain/10  Pain Location:

## 2021-09-14 NOTE — PROGRESS NOTES
Subjective:     Kristian Campos is a 80 y.o. female who presents today with the following:  Chief Complaint   Patient presents with    Agitation       Patient Active Problem List   Diagnosis Code    MCI (mild cognitive impairment) G31.84    White matter disease R90.82    B12 deficiency E53.8    COPD (chronic obstructive pulmonary disease) ([facility] [address] J44.9    HTN (hypertension) I10    Age-related osteoporosis without current pathological fracture M81.0    Acute cystitis without hematuria N30.00    Bradycardia R00.1    Dizziness R42    Vascular dementia without behavioral disturbance (HCC) F01.50    CKD (chronic kidney disease) stage 3, GFR 30-59 ml/min ([facility] N18.30    Neck pain M54.2    Dry skin dermatitis L85.3    Hip fracture ([facility] [address] S72.009A     Daughter historian Patient's response to questions 1 to  2 word sentences. COMPLIANT WITH MEDICATION:   HTN; Denies chest pain, dyspnea, palpitations, headache and blurred vision. Blood pressure normotensive. Agitation; note and private discussion submitted during time of visit- mother doesn't like people talking about her. She is less  Interactive and does not initiate conversations. She repeatedly asks question regarding her mother who passed 5. She is getting agitated worse in the afternoons. Ms. Alpa Addison will ask care giver to call her daughter repeatedly and has bought of crying when daughter is not home and is told by caregiver that she is . She also gets hysterical when she thinks she has lost her baby. Ms. Alpa Addison is  ready for bed by 5 pm. She has fallen out of bed. Per daughter caregiver is with patient 24/7 per daughter an has slept with her to keep her calm. Weight loss: Sleeps more eats less . Goes to bed at 5 pm.     depression screening addressed currently depressed on zoloft.     abuse screening addressed denies    learning assessment addressed reviewed nurses notes    fall risk addressed not at risk    HM: addressed    ROS:  Gen: denies fever, chills, fatigue, weight loss, weight gain  HEENT:denies blurry vision, nasal congestion, sore throat  Resp: denies dypsnea, cough, wheezing  CV: denies chest pain radiating to the jaws or arms, palpitations, lower extremity edema  Abd: denies nausea, vomiting, diarrhea, constipation  Neuro: denies numbness/tingling  Endo: denies polyuria, polydipsia, heat/cold intolerance  Heme: no lymphadenopathy    Allergies   Allergen Reactions    Pcn [Penicillins] Shortness of Breath         Current Outpatient Medications:     donepeziL (ARICEPT) 5 mg tablet, TAKE 1 TABLET EVERY NIGHT, Disp: 90 Tablet, Rfl: 1    amLODIPine (NORVASC) 5 mg tablet, TAKE 1 TABLET BY MOUTH EVERY DAY, Disp: 90 Tablet, Rfl: 1    cyanocobalamin (Vitamin B-12) 1,000 mcg tablet, Take 1 Tablet by mouth every Monday, Wednesday, Friday., Disp: 30 Tablet, Rfl: 0    memantine (NAMENDA) 10 mg tablet, TAKE 1 TABLET BID (Patient taking differently: two (2) times a day. TAKE 1 TABLET BID), Disp: 180 Tablet, Rfl: 1    Wheel Chair vinita, Wheelchair, 1 unit, Dx: z99.3, M84. 459A, Disp: 1 Each, Rfl: 0    sertraline (ZOLOFT) 100 mg tablet, Take 1 Tab by mouth daily. , Disp: 90 Tab, Rfl: 3    cholecalciferol, vitamin D3, (Vitamin D3) 50 mcg (2,000 unit) tab, Take 1 Tab by mouth daily. , Disp: , Rfl:     aspirin delayed-release 81 mg tablet, Take 1 Tab by mouth daily. , Disp: 90 Tab, Rfl: 2    albuterol (PROVENTIL HFA, VENTOLIN HFA, PROAIR HFA) 90 mcg/actuation inhaler, Take 2 Puffs by inhalation every four (4) hours as needed for Wheezing., Disp: 3 Inhaler, Rfl: 3    fluticasone (FLONASE) 50 mcg/actuation nasal spray, 2 Sprays by Both Nostrils route daily. , Disp: 3 Bottle, Rfl: 3    Past Medical History:   Diagnosis Date    COPD (chronic obstructive pulmonary disease) (HCC)     Dementia (HCC)     GERD (gastroesophageal reflux disease)     HTN (hypertension)     Hypercholesterolemia     MCI (mild cognitive impairment) Past Surgical History:   Procedure Laterality Date    HX COLONOSCOPY      has had several    HX HIP FRACTURE TX Left 2021    HX MARLEN AND BSO         Social History     Tobacco Use   Smoking Status Former Smoker    Packs/day: 1.00    Years: 33.00    Pack years: 33.00    Types: Cigarettes    Start date: 1952   Ivette Yan Quit date: 1985    Years since quittin.7   Smokeless Tobacco Never Used   Tobacco Comment    \"Quit many years ago\"       Social History     Socioeconomic History    Marital status:      Spouse name: Not on file    Number of children: Not on file    Years of education: Not on file    Highest education level: Not on file   Tobacco Use    Smoking status: Former Smoker     Packs/day: 1.00     Years: 33.00     Pack years: 33.00     Types: Cigarettes     Start date: 1952     Quit date: 1985     Years since quittin.7    Smokeless tobacco: Never Used    Tobacco comment: \"Quit many years ago\"   Vaping Use    Vaping Use: Never used   Substance and Sexual Activity    Alcohol use: No    Drug use: No   Other Topics Concern     Service No    Blood Transfusions No    Caffeine Concern No    Occupational Exposure No    Hobby Hazards No    Sleep Concern No    Stress Concern No    Weight Concern No    Special Diet No    Back Care No    Exercise No    Seat Belt Yes    Self-Exams Yes     Social Determinants of Health     Financial Resource Strain:     Difficulty of Paying Living Expenses:    Food Insecurity:     Worried About Running Out of Food in the Last Year:     Ran Out of Food in the Last Year:    Transportation Needs:     Lack of Transportation (Medical):      Lack of Transportation (Non-Medical):    Physical Activity:     Days of Exercise per Week:     Minutes of Exercise per Session:    Stress:     Feeling of Stress :    Social Connections:     Frequency of Communication with Friends and Family:     Frequency of Social Gatherings with Friends and Family:     Attends [employer] Services:     Active Member of Clubs or Organizations:     Attends Club or Organization Meetings:     Marital Status:        Family History   Problem Relation Age of Onset    Cancer Mother     Cancer Father     Stroke Sister     Cancer Sister          Objective:     Visit Vitals  /66 (BP 1 Location: Right upper arm, BP Patient Position: Sitting, BP Cuff Size: Adult)   Pulse 72   Temp 97.4 °F (36.3 °C) (Temporal)   Resp 22   Ht 5' 7\" (1.702 m)   Wt 134 lb (60.8 kg)   SpO2 97%   BMI 20.99 kg/m²     Body mass index is 20.99 kg/m². General: Alert pleasant. Quiet answers questions 1 to words. No acute distress. Down 10 lbs since July. HENT :  Eyes: pupils equal, round, react to light and accommodation. Nose: patent. Mouth and throat is clear. MASK  Neck:supple full range of motion no thyromegaly. Trachea midline, No carotid bruits. No significant lymphadenopathy  Lungs[de-identified] clear to auscultation without wheezes, rales, or rhonchi. Heart :RRR, S1 & S2 are normal intensity. No murmur; no gallop  Abdomen: bowel sounds active. No tenderness, guarding, rebound, masses, hepatic or spleen enlargement  Back: no CVA tenderness. Extremities: without clubbing, cyanosis, or edema  Pulses: radial pulses are normal  Neuro: One 2 word sentences . Does not initiate conversation. Cranial nerves II through XII grossly normal.  Motor: is 4 over 5 and symmetrical. Ambulates slowly with walker   Deep tendon reflexes: +2 equal  Psych:appropriate behavior, mood, affect and judgement.    Results for orders placed or performed in visit on 07/26/21   TSH 3RD GENERATION   Result Value Ref Range    TSH 1.64 0.36 - 3.74 uIU/mL   VITAMIN B12   Result Value Ref Range    Vitamin B12 1,386 (H) 193 - 986 pg/mL   VITAMIN D, 25 HYDROXY   Result Value Ref Range    Vitamin D 25-Hydroxy 23.5 (L) 30 - 499 ng/mL   METABOLIC PANEL, COMPREHENSIVE   Result Value Ref Range    Sodium 145 136 - 145 mmol/L    Potassium 3.9 3.5 - 5.1 mmol/L    Chloride 108 97 - 108 mmol/L    CO2 26 21 - 32 mmol/L    Anion gap 11 5 - 15 mmol/L    Glucose 75 65 - 100 mg/dL    BUN 16 6 - 20 MG/DL    Creatinine 0.99 0.55 - 1.02 MG/DL    BUN/Creatinine ratio 16 12 - 20      GFR est AA >60 >60 ml/min/1.73m2    GFR est non-AA 53 (L) >60 ml/min/1.73m2    Calcium 9.1 8.5 - 10.1 MG/DL    Bilirubin, total 0.5 0.2 - 1.0 MG/DL    ALT (SGPT) 25 12 - 78 U/L    AST (SGOT) 29 15 - 37 U/L    Alk. phosphatase 95 45 - 117 U/L    Protein, total 6.9 6.4 - 8.2 g/dL    Albumin 3.6 3.5 - 5.0 g/dL    Globulin 3.3 2.0 - 4.0 g/dL    A-G Ratio 1.1 1.1 - 2.2     CBC WITH AUTOMATED DIFF   Result Value Ref Range    WBC 6.7 3.6 - 11.0 K/uL    RBC 4.20 3.80 - 5.20 M/uL    HGB 12.8 11.5 - 16.0 g/dL    HCT 40.0 35.0 - 47.0 %    MCV 95.2 80.0 - 99.0 FL    MCH 30.5 26.0 - 34.0 PG    MCHC 32.0 30.0 - 36.5 g/dL    RDW 13.4 11.5 - 14.5 %    PLATELET 309 566 - 790 K/uL    MPV 10.2 8.9 - 12.9 FL    NRBC 0.0 0  WBC    ABSOLUTE NRBC 0.00 0.00 - 0.01 K/uL    NEUTROPHILS 57 32 - 75 %    LYMPHOCYTES 29 12 - 49 %    MONOCYTES 9 5 - 13 %    EOSINOPHILS 3 0 - 7 %    BASOPHILS 2 (H) 0 - 1 %    IMMATURE GRANULOCYTES 0 0.0 - 0.5 %    ABS. NEUTROPHILS 3.8 1.8 - 8.0 K/UL    ABS. LYMPHOCYTES 1.9 0.8 - 3.5 K/UL    ABS. MONOCYTES 0.6 0.0 - 1.0 K/UL    ABS. EOSINOPHILS 0.2 0.0 - 0.4 K/UL    ABS. BASOPHILS 0.1 0.0 - 0.1 K/UL    ABS. IMM. GRANS. 0.0 0.00 - 0.04 K/UL    DF AUTOMATED         No results found for this visit on 09/13/21. Assessment/ Plan:     1. Late onset Alzheimer's dementia with behavioral disturbance ([facility] [address]  Start Depakote once a day in the afternoon . Follow up in 1 month or sooner as needed    2. Encounter for immunization    - ADMIN INFLUENZA VIRUS VAC  - INFLUENZA VIRUS VACCINE, HIGH DOSE SEASONAL, PRESERVATIVE FREE    3. Agitation due to dementia [facility])  Start Depakote once a day in the afternoon . Follow up in 1 month or sooner as needed    4. Weight loss; encourage frequent healthy snacks with high caloric value and treats. Reevaluate in 1 month. 5. HTN; BP in goal continue amlodipine     6. Fall risk ; review fall safety uses walker . Discussed having coverage to have a caregivers who are designated to be awake to help prevent falls. Orders Placed This Encounter    INFLUENZA VIRUS VACCINE, HIGH DOSE SEASONAL, PRESERVATIVE FREE    ADMIN INFLUENZA VIRUS VAC         Verbal and written instructions (see AVS) provided. Patient expresses understanding of diagnosis and treatment plan.     Health Maintenance Due   Topic Date Due    Shingrix Vaccine Age 49> (1 of 2) Never done               Gramee Benedict, DENNISP-C

## 2022-02-13 DIAGNOSIS — F01.50 VASCULAR DEMENTIA WITHOUT BEHAVIORAL DISTURBANCE (HCC): ICD-10-CM

## 2022-02-14 RX ORDER — MEMANTINE HYDROCHLORIDE 10 MG/1
TABLET ORAL
Qty: 180 TABLET | Refills: 1 | Status: SHIPPED | OUTPATIENT
Start: 2022-02-14 | End: 2022-06-27 | Stop reason: ALTCHOICE

## 2022-03-07 ENCOUNTER — OFFICE VISIT (OUTPATIENT)
Dept: FAMILY MEDICINE CLINIC | Age: 87
End: 2022-03-07
Payer: MEDICARE

## 2022-03-07 VITALS
DIASTOLIC BLOOD PRESSURE: 60 MMHG | BODY MASS INDEX: 21.47 KG/M2 | OXYGEN SATURATION: 98 % | RESPIRATION RATE: 17 BRPM | WEIGHT: 136.8 LBS | TEMPERATURE: 97.6 F | HEART RATE: 70 BPM | SYSTOLIC BLOOD PRESSURE: 100 MMHG | HEIGHT: 67 IN

## 2022-03-07 DIAGNOSIS — I49.5 SICK SINUS SYNDROME (HCC): ICD-10-CM

## 2022-03-07 DIAGNOSIS — J41.0 SIMPLE CHRONIC BRONCHITIS (HCC): ICD-10-CM

## 2022-03-07 DIAGNOSIS — F03.911 AGITATION DUE TO DEMENTIA: ICD-10-CM

## 2022-03-07 DIAGNOSIS — Z00.00 MEDICARE ANNUAL WELLNESS VISIT, SUBSEQUENT: Primary | ICD-10-CM

## 2022-03-07 DIAGNOSIS — R09.89 LUNG CRACKLES: ICD-10-CM

## 2022-03-07 DIAGNOSIS — I10 PRIMARY HYPERTENSION: ICD-10-CM

## 2022-03-07 DIAGNOSIS — R53.1 WEAKNESS: ICD-10-CM

## 2022-03-07 DIAGNOSIS — N18.30 STAGE 3 CHRONIC KIDNEY DISEASE, UNSPECIFIED WHETHER STAGE 3A OR 3B CKD (HCC): ICD-10-CM

## 2022-03-07 DIAGNOSIS — E55.9 VITAMIN D DEFICIENCY: ICD-10-CM

## 2022-03-07 DIAGNOSIS — E53.8 B12 DEFICIENCY: ICD-10-CM

## 2022-03-07 PROCEDURE — 99214 OFFICE O/P EST MOD 30 MIN: CPT | Performed by: NURSE PRACTITIONER

## 2022-03-07 PROCEDURE — G0439 PPPS, SUBSEQ VISIT: HCPCS | Performed by: NURSE PRACTITIONER

## 2022-03-07 RX ORDER — SERTRALINE HYDROCHLORIDE 100 MG/1
100 TABLET, FILM COATED ORAL DAILY
Qty: 90 TABLET | Refills: 3 | Status: SHIPPED | OUTPATIENT
Start: 2022-03-07 | End: 2022-06-27

## 2022-03-07 RX ORDER — DONEPEZIL HYDROCHLORIDE 5 MG/1
5 TABLET, FILM COATED ORAL
Qty: 90 TABLET | Refills: 3 | Status: SHIPPED | OUTPATIENT
Start: 2022-03-07 | End: 2022-06-27 | Stop reason: ALTCHOICE

## 2022-03-07 RX ORDER — AMLODIPINE BESYLATE 2.5 MG/1
2.5 TABLET ORAL DAILY
Qty: 90 TABLET | Refills: 3 | Status: SHIPPED | OUTPATIENT
Start: 2022-03-07 | End: 2022-06-27 | Stop reason: ALTCHOICE

## 2022-03-07 NOTE — PROGRESS NOTES
1. Have you been to the ER, urgent care clinic since your last visit? Hospitalized since your last visit? No    2. Have you seen or consulted any other health care providers outside of the [address] [place] [address] since your last visit? Include any pap smears or colon screening. No   This is the Subsequent Medicare Annual Wellness Exam, performed 12 months or more after the Initial AWV or the last Subsequent AWV    I have reviewed the patient's medical history in detail and updated the computerized patient record. Assessment/Plan   Education and counseling provided:  Are appropriate based on today's review and evaluation    1. Medicare annual wellness visit, subsequent  2. Agitation due to dementia ([facility]  3. Simple chronic bronchitis ([facility]  4. Sick sinus syndrome (HCC)  5. Stage 3 chronic kidney disease, unspecified whether stage 3a or 3b CKD ([facility]  6. Primary hypertension  -     COLLECTION VENOUS BLOOD,VENIPUNCTURE; Future  -     CBC WITH AUTOMATED DIFF; Future  -     TSH 3RD GENERATION; Future  -     METABOLIC PANEL, COMPREHENSIVE; Future  7. Vitamin D deficiency  -     VITAMIN D, 25 HYDROXY; Future  8. B12 deficiency  -     VITAMIN B12; Future  9.  Weakness  -     REFERRAL TO HOME HEALTH  10. Lung crackles       Depression Risk Factor Screening:     3 most recent PHQ Screens 3/7/2022   PHQ Not Done -   Little interest or pleasure in doing things Not at all   Feeling down, depressed, irritable, or hopeless Not at all   Total Score PHQ 2 0   Trouble falling or staying asleep, or sleeping too much -   Feeling tired or having little energy -   Poor appetite, weight loss, or overeating -   Feeling bad about yourself - or that you are a failure or have let yourself or your family down -   Trouble concentrating on things such as school, work, reading, or watching TV -   Moving or speaking so slowly that other people could have noticed; or the opposite being so fidgety that others notice -   Thoughts of being better off dead, or hurting yourself in some way -   PHQ 9 Score -   How difficult have these problems made it for you to do your work, take care of your home and get along with others -       Alcohol & Drug Abuse Risk Screen    Do you average more than 1 drink per night or more than 7 drinks a week:  No    On any one occasion in the past three months have you have had more than 3 drinks containing alcohol:  No          Functional Ability and Level of Safety:    Hearing: Hearing is good. Activities of Daily Living: The home contains: handrails and grab bars  Patient does total self care      Ambulation: with mild difficulty     Fall Risk:  Fall Risk Assessment, last 12 mths 3/7/2022   Able to walk? Yes   Fall in past 12 months? 0   Do you feel unsteady? 0   Are you worried about falling 0   Is TUG test greater than 12 seconds? -   Is the gait abnormal? -   Number of falls in past 12 months -   Fall with injury?  -      Abuse Screen:  Patient is not abused       Cognitive Screening    Has your family/caregiver stated any concerns about your memory: no    Cognitive Screening: Normal - MMSE (Mini Mental Status Exam)    Health Maintenance Due     Health Maintenance Due   Topic Date Due    Shingrix Vaccine Age 49> (1 of 2) Never done    Medicare Yearly Exam  10/22/2021       Patient Care Team   Patient Care Team:  Devon Dai NP as PCP - General (Nurse Practitioner)  Devon Dai NP as PCP - [facility]  Roe Johansen MD (Cardiology)  Roe Johansen MD (Cardiology)    History     Patient Active Problem List   Diagnosis Code    MCI (mild cognitive impairment) G31.84    White matter disease R90.82    B12 deficiency E53.8    COPD (chronic obstructive pulmonary disease) ([facility] J44.9    HTN (hypertension) I10    Age-related osteoporosis without current pathological fracture M81.0    Acute cystitis without hematuria N30.00    Bradycardia R00.1    Dizziness R42    Vascular dementia without behavioral disturbance ([facility]) F01.50    CKD (chronic kidney disease) stage 3, GFR 30-59 ml/min ([facility]) N18.30    Neck pain M54.2    Dry skin dermatitis L85.3    Hip fracture ([facility] S72.009A     Past Medical History:   Diagnosis Date    COPD (chronic obstructive pulmonary disease) ([facility])     Dementia (HCC)     GERD (gastroesophageal reflux disease)     HTN (hypertension)     Hypercholesterolemia     MCI (mild cognitive impairment)       Past Surgical History:   Procedure Laterality Date    HX COLONOSCOPY  6/14    has had several    HX HIP FRACTURE TX Left 03/06/2021    HX MARLEN AND BSO       Current Outpatient Medications   Medication Sig Dispense Refill    amLODIPine (NORVASC) 2.5 mg tablet Take 1 Tablet by mouth daily. 90 Tablet 3    donepeziL (ARICEPT) 5 mg tablet Take 1 Tablet by mouth nightly. 90 Tablet 3    sertraline (ZOLOFT) 100 mg tablet Take 1 Tablet by mouth daily. 90 Tablet 3    memantine (NAMENDA) 10 mg tablet TAKE 1 TABLET TWICE DAILY 180 Tablet 1    cyanocobalamin (Vitamin B-12) 1,000 mcg tablet Take 1 Tablet by mouth every Monday, Wednesday, Friday. 30 Tablet 0    cholecalciferol, vitamin D3, (Vitamin D3) 50 mcg (2,000 unit) tab Take 1 Tab by mouth daily.  aspirin delayed-release 81 mg tablet Take 1 Tab by mouth daily. 90 Tab 2    albuterol (PROVENTIL HFA, VENTOLIN HFA, PROAIR HFA) 90 mcg/actuation inhaler Take 2 Puffs by inhalation every four (4) hours as needed for Wheezing. 3 Inhaler 3    fluticasone (FLONASE) 50 mcg/actuation nasal spray 2 Sprays by Both Nostrils route daily.  3 Bottle 3     Allergies   Allergen Reactions    Pcn [Penicillins] Shortness of Breath       Family History   Problem Relation Age of Onset    Cancer Mother     Cancer Father     Stroke Sister     Cancer Sister      Social History     Tobacco Use    Smoking status: Former Smoker     Packs/day: 1.00     Years: 33.00     Pack years: 33.00     Types: Cigarettes     Start date: 1/1/1952 Quit date: 1985     Years since quittin.2    Smokeless tobacco: Never Used    Tobacco comment: \"Quit many years ago\"   Substance Use Topics    Alcohol use: No       Delsie Sale

## 2022-03-07 NOTE — PROGRESS NOTES
Chief Complaint   Patient presents with    Medication Evaluation     3mth check up       HPI:     is a 80 y.o. female who presents today for her check up accompanied by her daughter, Jeremiah Castaneda. Ms. Almnedarez Come lives with her other daughter, Robi Hermosillo. Dementia: Progressive, worse in the afternoon. The daughter reports increased confusion, fatigue, irritability. No neurologist. Decreased appetite. Incontinent of urine and stool. Ambulatory with a walker but has a shuffling gait. Agitation is tolerable at present. HTN: On amlodipine. Previously followed with Dr. Page Richards but no recent appt. New issues: The patient has been doing well. She had one recent fall when she rolled out of bed. Family was there. No injuries. She does have some generalized weakness that may benefit from [facility]. She is homebound. Allergies   Allergen Reactions    Pcn [Penicillins] Shortness of Breath       Current Outpatient Medications   Medication Sig    amLODIPine (NORVASC) 2.5 mg tablet Take 1 Tablet by mouth daily.  donepeziL (ARICEPT) 5 mg tablet Take 1 Tablet by mouth nightly.  sertraline (ZOLOFT) 100 mg tablet Take 1 Tablet by mouth daily.  memantine (NAMENDA) 10 mg tablet TAKE 1 TABLET TWICE DAILY    cyanocobalamin (Vitamin B-12) 1,000 mcg tablet Take 1 Tablet by mouth every Monday, Wednesday, Friday.  cholecalciferol, vitamin D3, (Vitamin D3) 50 mcg (2,000 unit) tab Take 1 Tab by mouth daily.  aspirin delayed-release 81 mg tablet Take 1 Tab by mouth daily.  albuterol (PROVENTIL HFA, VENTOLIN HFA, PROAIR HFA) 90 mcg/actuation inhaler Take 2 Puffs by inhalation every four (4) hours as needed for Wheezing.  fluticasone (FLONASE) 50 mcg/actuation nasal spray 2 Sprays by Both Nostrils route daily. No current facility-administered medications for this visit.        Past Medical History:   Diagnosis Date    COPD (chronic obstructive pulmonary disease) (HCC)     Dementia (HCC)     GERD (gastroesophageal reflux disease)     HTN (hypertension)     Hypercholesterolemia     MCI (mild cognitive impairment)        Family History   Problem Relation Age of Onset    Cancer Mother     Cancer Father     Stroke Sister     Cancer Sister        ROS:  Denies fever, chills, cough, chest pain, SOB,  nausea, vomiting, diarrhea, dysuria. Denies rashes, wounds, arthralgias, + weakness, numbness, visual changes, depression, wt loss, wt gain, hemoptysis, hematochezia or melena. Patient is not experiencing chest pain radiating to the jaw and/or down the arms. Physical Examination:    /60 (BP 1 Location: Right arm, BP Patient Position: Sitting, BP Cuff Size: Adult)   Pulse 70   Temp 97.6 °F (36.4 °C) (Temporal)   Resp 17   Ht 5' 7\" (1.702 m)   Wt 136 lb 12.8 oz (62.1 kg)   SpO2 98%   BMI 21.43 kg/m²     Wt Readings from Last 3 Encounters:   03/07/22 136 lb 12.8 oz (62.1 kg)   09/13/21 134 lb (60.8 kg)   07/26/21 143 lb 9.6 oz (65.1 kg)     Constitutional: WDWN Female in no acute distress  HENT:  NC/AT, TMs pearly gray, [place], hearing aids noted bilaterally. OP: clear  EYES: EOMI, PERRL  Neck:  Supple, no JVD, mass or bruit. No thyromegaly. Respiratory:  Respirations even and unlabored without use of accessory muscles, crackles LLL, Symmetrical chest expansion. Cardiac: Regular rate, pause auscultated  Musculoskeletal:  No cyanosis, clubbing or edema of extremities. Generalized weakness  Neurologic: In a wheelchair, CN 2-12 grossly intact. Skin: intact and warm to the touch, no rash   Lymphadenopathy: no cervical or supraclavicular nodes  Psych: Pleasantly confused. ASSESSMENT AND PLAN:       ICD-10-CM ICD-9-CM    1. Medicare annual wellness visit, subsequent  Z00.00 V70.0    2. Agitation due to dementia ([facility]  F03.91 294.21    3. Simple chronic bronchitis (HCC)  J41.0 491.0    4. Sick sinus syndrome (HCC)  I49.5 427.81    5.  Stage 3 chronic kidney disease, unspecified whether stage 3a or 3b CKD ([facility]  N18.30 585.3    6. Primary hypertension  I10 401.9 COLLECTION VENOUS BLOOD,VENIPUNCTURE      CBC WITH AUTOMATED DIFF      TSH 3RD GENERATION      METABOLIC PANEL, COMPREHENSIVE      METABOLIC PANEL, COMPREHENSIVE      TSH 3RD GENERATION      CBC WITH AUTOMATED DIFF      COLLECTION VENOUS BLOOD,VENIPUNCTURE   7. Vitamin D deficiency  E55.9 268.9 VITAMIN D, 25 HYDROXY      VITAMIN D, 25 HYDROXY   8. B12 deficiency  E53.8 266.2 VITAMIN B12      VITAMIN B12   9. Weakness  R53.1 780.79 REFERRAL TO HOME HEALTH   10. Lung crackles  R09.89 786.7      Dementia is stable. Family feels safe taking care of her and feels safe with the living arrangements. No med changes. If irritability becomes more of an issue, consider lorazepam or hydroxyzine. Check labs for chronic conditions. BP low today. Cut amlodipine to 2.5 mg. Reviewed cardiology notes. Ok to follow here for the time being, SSS is stable. Refer to [facility] for generalized strengthening. Crackles heard today in LLL. I discussed this with the patient's daughter who thinks her mother may be having some swallowing difficulty. Discussed evaluation but daughter opts for monitoring for now. Daughter aware of plan of care and verbalized understanding. Questions answered. RTC 3 months, or sooner if needed.     Deanne Ramirez NP

## 2022-03-07 NOTE — PATIENT INSTRUCTIONS

## 2022-03-08 LAB
25(OH)D3 SERPL-MCNC: 25.6 NG/ML (ref 30–100)
ALBUMIN SERPL-MCNC: 3.4 G/DL (ref 3.5–5)
ALBUMIN/GLOB SERPL: 1.1 {RATIO} (ref 1.1–2.2)
ALP SERPL-CCNC: 94 U/L (ref 45–117)
ALT SERPL-CCNC: 18 U/L (ref 12–78)
ANION GAP SERPL CALC-SCNC: 5 MMOL/L (ref 5–15)
AST SERPL-CCNC: 22 U/L (ref 15–37)
BASOPHILS # BLD: 0.1 K/UL (ref 0–0.1)
BASOPHILS NFR BLD: 1 % (ref 0–1)
BILIRUB SERPL-MCNC: 0.3 MG/DL (ref 0.2–1)
BUN SERPL-MCNC: 17 MG/DL (ref 6–20)
BUN/CREAT SERPL: 20 (ref 12–20)
CALCIUM SERPL-MCNC: 9 MG/DL (ref 8.5–10.1)
CHLORIDE SERPL-SCNC: 110 MMOL/L (ref 97–108)
CO2 SERPL-SCNC: 25 MMOL/L (ref 21–32)
CREAT SERPL-MCNC: 0.83 MG/DL (ref 0.55–1.02)
DIFFERENTIAL METHOD BLD: NORMAL
EOSINOPHIL # BLD: 0.3 K/UL (ref 0–0.4)
EOSINOPHIL NFR BLD: 3 % (ref 0–7)
ERYTHROCYTE [DISTWIDTH] IN BLOOD BY AUTOMATED COUNT: 12.6 % (ref 11.5–14.5)
GLOBULIN SER CALC-MCNC: 3 G/DL (ref 2–4)
GLUCOSE SERPL-MCNC: 94 MG/DL (ref 65–100)
HCT VFR BLD AUTO: 41 % (ref 35–47)
HGB BLD-MCNC: 13.2 G/DL (ref 11.5–16)
IMM GRANULOCYTES # BLD AUTO: 0 K/UL (ref 0–0.04)
IMM GRANULOCYTES NFR BLD AUTO: 0 % (ref 0–0.5)
LYMPHOCYTES # BLD: 2.6 K/UL (ref 0.8–3.5)
LYMPHOCYTES NFR BLD: 28 % (ref 12–49)
MCH RBC QN AUTO: 31.1 PG (ref 26–34)
MCHC RBC AUTO-ENTMCNC: 32.2 G/DL (ref 30–36.5)
MCV RBC AUTO: 96.7 FL (ref 80–99)
MONOCYTES # BLD: 0.8 K/UL (ref 0–1)
MONOCYTES NFR BLD: 9 % (ref 5–13)
NEUTS SEG # BLD: 5.3 K/UL (ref 1.8–8)
NEUTS SEG NFR BLD: 59 % (ref 32–75)
NRBC # BLD: 0 K/UL (ref 0–0.01)
NRBC BLD-RTO: 0 PER 100 WBC
PLATELET # BLD AUTO: 266 K/UL (ref 150–400)
PMV BLD AUTO: 10.3 FL (ref 8.9–12.9)
POTASSIUM SERPL-SCNC: 3.7 MMOL/L (ref 3.5–5.1)
PROT SERPL-MCNC: 6.4 G/DL (ref 6.4–8.2)
RBC # BLD AUTO: 4.24 M/UL (ref 3.8–5.2)
SODIUM SERPL-SCNC: 140 MMOL/L (ref 136–145)
TSH SERPL DL<=0.05 MIU/L-ACNC: 1.66 UIU/ML (ref 0.36–3.74)
VIT B12 SERPL-MCNC: 626 PG/ML (ref 193–986)
WBC # BLD AUTO: 9 K/UL (ref 3.6–11)

## 2022-03-08 NOTE — PROGRESS NOTES
Home Care  Face to Face Encounter    Dino Lee is a 80 y.o. female presenting for/with:    Primary Diagnosis: Dementia, weakness  Date of Face to Face:  3/7/22                          Face to Face Encounter findings are related to primary reason for home care:   yes. 1. I certify that the patient needs intermittent care as follows: physical therapy: strengthening, stretching/ROM, transfer training, gait/stair training, balance training and pt/caregiver education    2. I certify that this patient is homebound, that is:   1) patient requires the use of a walker device, special transportation, or assistance of another to leave the home;     or 2) patient's condition makes leaving the home medically contraindicated;    3) patient has a normal inability to leave the home and leaving the home requires considerable and taxing effort. Patient may leave the home for infrequent and short duration for medical reasons, and occasional absences for non-medical reasons. Homebound status is due to the following functional limitations: Patient with poor safety awareness and is at risk for falls without assistance of another person and the use of an assistive device. Patient with poor ambulation endurance limiting their safe ability to ascend/descend the required number of steps to leave the home. 3. I certify that this patient is under my care and that I, or a nurse practitioner or [phone], or clinical nurse specialist, or certified nurse midwife, working with me, had a Face-to-Face Encounter that meets the physician Face-to-Face Encounter requirements.   The following are the clinical findings from the [address] [place] [address] encounter that support the need for skilled services and is a summary of the encounter: See attached progess note

## 2022-03-08 NOTE — PROGRESS NOTES
Please call daughter Denzel Martinez. Labs look great. Kidney function remains normal. Sugar is good at 94. Vitamin B12 is in the normal range, continue same. Vitamin D remains slightly low, increase supplement to 2 tablets daily.

## 2022-03-08 NOTE — PROGRESS NOTES
Patient verified by stating name and date of birth.  Patient informed of lab results and states understanding per Tanesha Marcial

## 2022-04-11 ENCOUNTER — TELEPHONE (OUTPATIENT)
Dept: FAMILY MEDICINE CLINIC | Age: 87
End: 2022-04-11

## 2022-04-11 RX ORDER — HYDROXYZINE 25 MG/1
TABLET, FILM COATED ORAL
Qty: 30 TABLET | Refills: 0 | Status: SHIPPED | OUTPATIENT
Start: 2022-04-11 | End: 2022-06-27 | Stop reason: SDUPTHER

## 2022-04-11 NOTE — TELEPHONE ENCOUNTER
Daughter states she was to call if her moms  agitation got worse and you could call something in .  Well is has . trisha callao they use

## 2022-04-12 NOTE — TELEPHONE ENCOUNTER
Please call daughter. I sent in an Rx for hydroxyzine to use PRN agitation. If this is ineffective, let me know. We have other options.

## 2022-04-15 ENCOUNTER — VIRTUAL VISIT (OUTPATIENT)
Dept: FAMILY MEDICINE CLINIC | Age: 87
End: 2022-04-15
Payer: MEDICARE

## 2022-04-15 DIAGNOSIS — R53.83 LETHARGY: Primary | ICD-10-CM

## 2022-04-15 LAB
BILIRUB UR QL STRIP: NEGATIVE
GLUCOSE UR-MCNC: NEGATIVE MG/DL
KETONES P FAST UR STRIP-MCNC: NEGATIVE MG/DL
PH UR STRIP: 6.5 [PH] (ref 4.6–8)
PROT UR QL STRIP: NEGATIVE
SP GR UR STRIP: 1.02 (ref 1–1.03)
UA UROBILINOGEN AMB POC: NORMAL (ref 0.2–1)
URINALYSIS CLARITY POC: CLEAR
URINALYSIS COLOR POC: YELLOW
URINE BLOOD POC: NEGATIVE
URINE LEUKOCYTES POC: NEGATIVE
URINE NITRITES POC: NEGATIVE

## 2022-04-15 PROCEDURE — G2025 DIS SITE TELE SVCS RHC/FQHC: HCPCS | Performed by: NURSE PRACTITIONER

## 2022-04-15 PROCEDURE — 81000 URINALYSIS NONAUTO W/SCOPE: CPT | Performed by: NURSE PRACTITIONER

## 2022-04-15 NOTE — PROGRESS NOTES
Lenny Uriostegui is a 80 y.o. female evaluated via telephone on 4/15/2022. Consent:    Lenny Uriostegui, who was evaluated through a synchronous (real-time) audio only encounter, and/or her healthcare decision maker, is aware that it is a billable service, which includes applicable co-pays, with coverage as determined by her insurance carrier. She provided verbal consent to proceed and patient identification was verified. This visit was conducted pursuant to the emergency declaration under the [facility], [address] [place] [address] authority and the [employer] General Act. A caregiver was present when appropriate. Ability to conduct physical exam was limited. The patient was located at home in a state where the provider was licensed to provide care. --Irene Fletcher NP on 4/15/2022 at 12:03 PM      Documentation:  I communicated with the patient and/or health care decision maker. I spoke with her daughter, Nilsa Escobedo. The patient was agitated earlier this week, occurs in the evening hours. She started taking 25 mg of hydroxyzine QHS for agitation. Now, she is sleepy during the day. Nilsa Escobedo denies any urinary s/s. Details of this discussion including any medical advice provided: UA WNL. Suggested monitoring symptoms, cut hydroxyzine back to 12.5 mg or move dose earlier in the evening if no improvement. We will send out culture. I affirm this is a Patient Initiated Episode with an Established Patient who has not had a related appointment within my department in the past 7 days or scheduled within the next 24 hours. ASSESSMENT AND PLAN:       ICD-10-CM ICD-9-CM    1. Lethargy  R53.83 780.79 CULTURE, URINE      AMB POC URINALYSIS DIP STICK MANUAL W/ MICRO         Daughter aware of plan of care and verbalized understanding. Questions answered. RTC PRN. Irene Fletcher NP    Total Time: 5-10 minutes.      Note: not billable if this call serves to triage the patient into an appointment for the relevant concern      Deion Hart NP

## 2022-04-15 NOTE — PROGRESS NOTES
Chief Complaint   Patient presents with    Bladder Infection     1. Have you been to the ER, urgent care clinic since your last visit? Hospitalized since your last visit? No    2. Have you seen or consulted any other health care providers outside of the [address] [place] [address] since your last visit? Include any pap smears or colon screening. No  Abuse Screening Questionnaire 4/15/2022   Do you ever feel afraid of your partner? N   Are you in a relationship with someone who physically or mentally threatens you? N   Is it safe for you to go home? Y     Fall Risk Assessment, last 12 mths 4/15/2022   Able to walk? Yes   Fall in past 12 months? 0   Do you feel unsteady? 0   Are you worried about falling 0   Is TUG test greater than 12 seconds? -   Is the gait abnormal? -   Number of falls in past 12 months -   Fall with injury? -     Abuse Screening Questionnaire 4/15/2022   Do you ever feel afraid of your partner? N   Are you in a relationship with someone who physically or mentally threatens you? N   Is it safe for you to go home?  Y     3 most recent PHQ Screens 4/15/2022   PHQ Not Done -   Little interest or pleasure in doing things Not at all   Feeling down, depressed, irritable, or hopeless Not at all   Total Score PHQ 2 0   Trouble falling or staying asleep, or sleeping too much -   Feeling tired or having little energy -   Poor appetite, weight loss, or overeating -   Feeling bad about yourself - or that you are a failure or have let yourself or your family down -   Trouble concentrating on things such as school, work, reading, or watching TV -   Moving or speaking so slowly that other people could have noticed; or the opposite being so fidgety that others notice -   Thoughts of being better off dead, or hurting yourself in some way -   PHQ 9 Score -   How difficult have these problems made it for you to do your work, take care of your home and get along with others -

## 2022-04-17 LAB
BACTERIA SPEC CULT: NORMAL
SERVICE CMNT-IMP: NORMAL

## 2022-04-18 NOTE — PROGRESS NOTES
Please call daughter Rita Edmond at [phone], urine culture was negative, normal. How is her mom doing?

## 2022-06-10 ENCOUNTER — TELEPHONE (OUTPATIENT)
Dept: FAMILY MEDICINE CLINIC | Age: 87
End: 2022-06-10

## 2022-06-10 NOTE — TELEPHONE ENCOUNTER
Abelardo Fregoso wants to know if Ritesh Pitts can put in [facility] orders for a Plan of Care for [place].

## 2022-06-27 ENCOUNTER — OFFICE VISIT (OUTPATIENT)
Dept: FAMILY MEDICINE CLINIC | Age: 87
End: 2022-06-27
Payer: MEDICARE

## 2022-06-27 VITALS
RESPIRATION RATE: 16 BRPM | WEIGHT: 119 LBS | HEIGHT: 67 IN | BODY MASS INDEX: 18.68 KG/M2 | TEMPERATURE: 98.1 F | SYSTOLIC BLOOD PRESSURE: 120 MMHG | OXYGEN SATURATION: 97 % | DIASTOLIC BLOOD PRESSURE: 76 MMHG | HEART RATE: 76 BPM

## 2022-06-27 DIAGNOSIS — F03.911 AGITATION DUE TO DEMENTIA: ICD-10-CM

## 2022-06-27 DIAGNOSIS — S72.001A CLOSED FRACTURE OF RIGHT HIP, INITIAL ENCOUNTER (HCC): ICD-10-CM

## 2022-06-27 DIAGNOSIS — R63.4 WEIGHT LOSS: Primary | ICD-10-CM

## 2022-06-27 PROCEDURE — 99214 OFFICE O/P EST MOD 30 MIN: CPT | Performed by: NURSE PRACTITIONER

## 2022-06-27 RX ORDER — HYDROXYZINE 25 MG/1
TABLET, FILM COATED ORAL
Qty: 90 TABLET | Refills: 3 | Status: SHIPPED | OUTPATIENT
Start: 2022-06-27

## 2022-06-27 NOTE — PATIENT INSTRUCTIONS
Dementia: Care Instructions  Your Care Instructions     Dementia is a loss of mental skills that affects your daily life. It is different than the occasional trouble with memory that is part of aging. You may find it hard to remember things that you feel you should be able to remember. Or you may feel that your mind is just not working as well as usual.  Finding out that you have dementia is a shock. You may be afraid and worried about how the condition will change your life. Although there is no cure at this time, medicine may slow memory loss and improve thinking for a while. Other medicines may be able to help you sleep or cope with depression and behavior changes. Dementia often gets worse slowly. But it can get worse quickly. As dementia gets worse, it may become harder to do common things that take planning, like making a list and going shopping. Over time, the disease may make it hard for you to take care of yourself. Some people with dementia need others to help care for them. Dementia is different for everyone. You may be able to function well for a long time. In the early stage of the condition, you can do things at home to make life easier and safer. You also can keep doing your hobbies and other activities. Many people find comfort in planning now for their future needs. Follow-up care is a key part of your treatment and safety. Be sure to make and go to all appointments, and call your doctor if you are having problems. It's also a good idea to know your test results and keep a list of the medicines you take. How can you care for yourself at home? · Take your medicines exactly as prescribed. Call your doctor if you think you are having a problem with your medicine. · Eat healthy foods. Eat lots of whole grains, fruits, and vegetables every day.  If you are not hungry, try snacks or nutritional drinks such as Boost, Ensure, or Sustacal.  · If you have problems sleeping:  ? Try not to nap too close to your bedtime. ? Exercise regularly. Walking is a good choice. ? Try a glass of warm milk or caffeine-free herbal tea before bed. · Do tasks and activities during the time of day when you feel your best. It may help to develop a daily routine. · Post labels, lists, and sticky notes to help you remember things. Write your activities on a calendar you can easily find. Put your clock where you can easily see it. · Stay active. Take walks in familiar places, or with friends or loved ones. Try to stay active mentally too. Read and work crossword puzzles if you enjoy these activities. · Do not drive unless you can pass an on-road driving test. If you are not sure if you are safe to drive, your state 's license bureau can test you. · Keep a cordless phone and a flashlight with new batteries by your bed. If possible, put a phone in each of the main rooms of your house, or carry a cell phone in case you fall and cannot reach a phone. Or, you can wear a device around your neck or wrist. You push a button that sends a signal for help. Acknowledge your emotions and plan for the future  · Talk openly and honestly with your doctor. · Let yourself grieve. It is common to feel angry, scared, frustrated, anxious, or depressed. · Get emotional support from family, friends, a support group, or a counselor experienced in working with people who have dementia. · Ask for help if you need it. · Tell your doctor how you feel. You may feel upset, angry, or worried at times. Many things can cause this, including poor sleep, medicine side effects, confusion, and pain. Your doctor may be able to help you. · Plan for the future. ? Talk to your family and doctor about preparing a living will and other important papers while you can make decisions. These papers tell your doctors how to care for you at the end of your life. ? Consider naming a person to make decisions about your care if you are not able to.   When should you call for help? Call 911 anytime you think you may need emergency care. For example, call if:    · You are lost and do not know whom to call.     · You are injured and do not know whom to call. Call your doctor now or seek immediate medical care if:    · You are more confused or upset than usual.     · You feel like you could hurt yourself because your mind is not working well. Watch closely for changes in your health, and be sure to contact your doctor if you have any problems. Where can you learn more? Go to http://www.damico.com/  Enter E497 in the search box to learn more about \"Dementia: Care Instructions. \"  Current as of: June 16, 2021               Content Version: 13.2  © 9291-0472 [employer]. Care instructions adapted under license by [employer] (which disclaims liability or warranty for this information). If you have questions about a medical condition or this instruction, always ask your healthcare professional. [address] any warranty or liability for your use of this information.

## 2022-06-27 NOTE — PROGRESS NOTES
Chief Complaint   Patient presents with    Follow-up     Referral for more PT from fall       HPI:    Dolly Campbell is a 80 y.o. female who presents today for follow up. Dementia: Progressive, worse in the afternoon. Decreased appetite. Incontinent of urine and stool. Ambulatory with a walker but has a shuffling gait. Agitation is tolerable at present. HTN: See below. New issues: The patient had a fall and had a closed nondisplaced intertrochanteric hip fracture on the R. She underwent surgical repair on 4/27/22 and then spent 6 weeks in rehab before returning home. She is now home with 24/7 care. Currently receiving [facility] services through [facility]. Want to continue with them. Her appetite is decreased and she requires assistance with meals. She has stopped all medications except for PRN hydroxyzine due to agitation associated with taking medications. Allergies   Allergen Reactions    Pcn [Penicillins] Shortness of Breath       Current Outpatient Medications   Medication Sig    hydrOXYzine HCL (ATARAX) 25 mg tablet 1-2 tablets PO Q6H PRN agitation     No current facility-administered medications for this visit. Past Medical History:   Diagnosis Date    COPD (chronic obstructive pulmonary disease) (HCC)     Dementia (HCC)     GERD (gastroesophageal reflux disease)     HTN (hypertension)     Hypercholesterolemia     MCI (mild cognitive impairment)        Family History   Problem Relation Age of Onset    Cancer Mother     Cancer Father     Stroke Sister     Cancer Sister        ROS:  Denies fever, chills, cough, chest pain, SOB,  nausea, vomiting, + diarrhea, dysuria. Denies rashes, wounds, arthralgias, + weakness, numbness, visual changes, depression, + wt loss, wt gain, hemoptysis, hematochezia or melena. Patient is not experiencing chest pain radiating to the jaw and/or down the arms.     Physical Examination:    /76 (BP 1 Location: Right arm, BP Patient Position: Sitting, BP Cuff Size: Adult)   Pulse 76   Temp 98.1 °F (36.7 °C) (Temporal)   Resp 16   Ht 5' 7\" (1.702 m)   Wt 119 lb (54 kg)   SpO2 97%   BMI 18.64 kg/m²     Wt Readings from Last 3 Encounters:   06/27/22 119 lb (54 kg)   03/07/22 136 lb 12.8 oz (62.1 kg)   09/13/21 134 lb (60.8 kg)     Constitutional: WDWN Female in no acute distress  HENT:  NC/AT, TMs pearly gray, [place], hearing aids noted bilaterally. OP: clear  EYES: EOMI, PERRL  Neck:  Supple, no JVD, mass or bruit. No thyromegaly. Respiratory:  Respirations even and unlabored without use of accessory muscles, lungs clear throughout. Symmetrical chest expansion. Cardiac: Regular rate, pause auscultated  Musculoskeletal:  No cyanosis, clubbing or edema of extremities. Generalized weakness  Neurologic: In a wheelchair, CN 2-12 grossly intact. Skin: intact and warm to the touch, no rash   Lymphadenopathy: no cervical or supraclavicular nodes  Psych: Pleasantly confused. ASSESSMENT AND PLAN:       ICD-10-CM ICD-9-CM    1. Weight loss  R63.4 783.21 COLLECTION VENOUS BLOOD,VENIPUNCTURE      CBC WITH AUTOMATED DIFF      METABOLIC PANEL, COMPREHENSIVE      METABOLIC PANEL, COMPREHENSIVE      CBC WITH AUTOMATED DIFF      COLLECTION VENOUS BLOOD,VENIPUNCTURE   2. Agitation due to dementia ([facility]  F03.91 294.21 hydrOXYzine HCL (ATARAX) 25 mg tablet   3. Closed fracture of right hip, initial encounter ([facility]  S72.001A 820.8       Ok to renew At Home services. Labs as above. Discussed weight loss, goals of care, medications with the caregiver as well as her daughter, Jacob Michaels. Consider hospice in the future if she declines. Daughter aware of plan of care and verbalized understanding. Questions answered. RTC 3 months, or sooner if needed.     Lonza Bumps, NP

## 2022-06-28 LAB
ALBUMIN SERPL-MCNC: 3.5 G/DL (ref 3.5–5)
ALBUMIN/GLOB SERPL: 1.1 {RATIO} (ref 1.1–2.2)
ALP SERPL-CCNC: 91 U/L (ref 45–117)
ALT SERPL-CCNC: 18 U/L (ref 12–78)
ANION GAP SERPL CALC-SCNC: 6 MMOL/L (ref 5–15)
AST SERPL-CCNC: 22 U/L (ref 15–37)
BASOPHILS # BLD: 0.1 K/UL (ref 0–0.1)
BASOPHILS NFR BLD: 1 % (ref 0–1)
BILIRUB SERPL-MCNC: 0.3 MG/DL (ref 0.2–1)
BUN SERPL-MCNC: 15 MG/DL (ref 6–20)
BUN/CREAT SERPL: 15 (ref 12–20)
CALCIUM SERPL-MCNC: 9.8 MG/DL (ref 8.5–10.1)
CHLORIDE SERPL-SCNC: 108 MMOL/L (ref 97–108)
CO2 SERPL-SCNC: 28 MMOL/L (ref 21–32)
CREAT SERPL-MCNC: 0.99 MG/DL (ref 0.55–1.02)
DIFFERENTIAL METHOD BLD: ABNORMAL
EOSINOPHIL # BLD: 0.2 K/UL (ref 0–0.4)
EOSINOPHIL NFR BLD: 3 % (ref 0–7)
ERYTHROCYTE [DISTWIDTH] IN BLOOD BY AUTOMATED COUNT: 13.4 % (ref 11.5–14.5)
GLOBULIN SER CALC-MCNC: 3.2 G/DL (ref 2–4)
GLUCOSE SERPL-MCNC: 145 MG/DL (ref 65–100)
HCT VFR BLD AUTO: 40.6 % (ref 35–47)
HGB BLD-MCNC: 12.7 G/DL (ref 11.5–16)
IMM GRANULOCYTES # BLD AUTO: 0 K/UL (ref 0–0.04)
IMM GRANULOCYTES NFR BLD AUTO: 0 % (ref 0–0.5)
LYMPHOCYTES # BLD: 1.9 K/UL (ref 0.8–3.5)
LYMPHOCYTES NFR BLD: 24 % (ref 12–49)
MCH RBC QN AUTO: 31.8 PG (ref 26–34)
MCHC RBC AUTO-ENTMCNC: 31.3 G/DL (ref 30–36.5)
MCV RBC AUTO: 101.5 FL (ref 80–99)
MONOCYTES # BLD: 0.6 K/UL (ref 0–1)
MONOCYTES NFR BLD: 7 % (ref 5–13)
NEUTS SEG # BLD: 5 K/UL (ref 1.8–8)
NEUTS SEG NFR BLD: 65 % (ref 32–75)
NRBC # BLD: 0 K/UL (ref 0–0.01)
NRBC BLD-RTO: 0 PER 100 WBC
PLATELET # BLD AUTO: 282 K/UL (ref 150–400)
PMV BLD AUTO: 10.1 FL (ref 8.9–12.9)
POTASSIUM SERPL-SCNC: 4.2 MMOL/L (ref 3.5–5.1)
PROT SERPL-MCNC: 6.7 G/DL (ref 6.4–8.2)
RBC # BLD AUTO: 4 M/UL (ref 3.8–5.2)
SODIUM SERPL-SCNC: 142 MMOL/L (ref 136–145)
WBC # BLD AUTO: 7.7 K/UL (ref 3.6–11)

## 2022-06-28 NOTE — PROGRESS NOTES
Patient verified by stating name and date of birth.  Patient informed of lab results and states understanding per Jess Kendall

## 2022-07-25 ENCOUNTER — TELEPHONE (OUTPATIENT)
Dept: FAMILY MEDICINE CLINIC | Age: 87
End: 2022-07-25

## 2022-07-25 DIAGNOSIS — R40.0 SOMNOLENCE: Primary | ICD-10-CM

## 2022-07-25 DIAGNOSIS — R53.81 OTHER MALAISE: ICD-10-CM

## 2022-07-25 NOTE — TELEPHONE ENCOUNTER
Lucía Bishop is stating her mom's mental state is getting worse. She seems to be at a different stage with her alzheimer's. She is very tired,doesn't want to get up and when she does she becomes very agitated. Lucía Bishop knows that the last [address] was  1-27-21 and it needs to be updated. She also stated [facility] and needs to give us this information.

## 2022-07-25 NOTE — TELEPHONE ENCOUNTER
I spoke with the daughter, Anu Dudley. Her mother has been increasingly lethargic, confused, and agitated. The caretaker is giving 1 tablet of Atarax once or twice a day for irritability but this is causing Ms. Ramey to sleep. She isn't eating well. Denies fever, cough, urinary symptoms. Cut Atarax to 1/2 tablet, 12.5 mg, PRN. Use sparingly. Encouraged routines. Check a UA.

## 2022-07-28 ENCOUNTER — CLINICAL SUPPORT (OUTPATIENT)
Dept: FAMILY MEDICINE CLINIC | Age: 87
End: 2022-07-28
Payer: MEDICARE

## 2022-07-28 DIAGNOSIS — R40.0 SOMNOLENCE: ICD-10-CM

## 2022-07-28 DIAGNOSIS — R53.81 OTHER MALAISE: ICD-10-CM

## 2022-07-28 LAB
BILIRUB UR QL STRIP: NEGATIVE
GLUCOSE UR-MCNC: NEGATIVE MG/DL
KETONES P FAST UR STRIP-MCNC: NEGATIVE MG/DL
PH UR STRIP: 6 [PH] (ref 4.6–8)
PROT UR QL STRIP: NEGATIVE
SP GR UR STRIP: 1.02 (ref 1–1.03)
UA UROBILINOGEN AMB POC: NORMAL (ref 0.2–1)
URINALYSIS CLARITY POC: NORMAL
URINALYSIS COLOR POC: NORMAL
URINE BLOOD POC: NEGATIVE
URINE LEUKOCYTES POC: NEGATIVE
URINE NITRITES POC: NEGATIVE

## 2022-07-28 PROCEDURE — 81000 URINALYSIS NONAUTO W/SCOPE: CPT | Performed by: NURSE PRACTITIONER

## 2022-07-28 NOTE — PROGRESS NOTES
A urine specimen was dropped per Gertrudis sloan / ashtyn. Rosa M Friedman has put in future orders for a UA and Urine Culture.

## 2022-07-29 ENCOUNTER — TELEPHONE (OUTPATIENT)
Dept: FAMILY MEDICINE CLINIC | Age: 87
End: 2022-07-29

## 2022-07-31 LAB
BACTERIA SPEC CULT: ABNORMAL
BACTERIA SPEC CULT: ABNORMAL
CC UR VC: ABNORMAL
SERVICE CMNT-IMP: ABNORMAL

## 2022-08-01 RX ORDER — NITROFURANTOIN 25; 75 MG/1; MG/1
100 CAPSULE ORAL 2 TIMES DAILY
Qty: 10 CAPSULE | Refills: 0 | Status: SHIPPED | OUTPATIENT
Start: 2022-08-01 | End: 2022-08-06

## 2022-08-01 NOTE — PROGRESS NOTES
Please call, urine culture came back positive. I am going to put her on antibiotics for 5 days and see if this helps increase her alertness. I sent the Rx to [facility].

## 2022-09-20 ENCOUNTER — TELEPHONE (OUTPATIENT)
Dept: FAMILY MEDICINE CLINIC | Age: 87
End: 2022-09-20

## 2022-09-20 DIAGNOSIS — R41.82 ALTERED MENTAL STATUS, UNSPECIFIED ALTERED MENTAL STATUS TYPE: ICD-10-CM

## 2022-09-20 DIAGNOSIS — F03.911 AGITATION DUE TO DEMENTIA: Primary | ICD-10-CM

## 2022-09-20 RX ORDER — LORAZEPAM 0.5 MG/1
0.5 TABLET ORAL
Qty: 20 TABLET | Refills: 0 | Status: SHIPPED | OUTPATIENT
Start: 2022-09-20

## 2022-09-20 NOTE — TELEPHONE ENCOUNTER
Please give Yael Flores a call. What's going on with her mom? How is she doing with rest, eating? Are they giving her the hydroxyzine medicine? If so, how are they giving it? We have other medications we can use as needed. Just let me know how she's doing and we will go from there. No visit needed.

## 2022-09-20 NOTE — TELEPHONE ENCOUNTER
Pts daughter would like a call back, states that pt has been very combative lately and was told that she could call if this happened again for advice. Please advise.   Call back # [phone]

## 2022-09-20 NOTE — TELEPHONE ENCOUNTER
Sp/w Emiliano Aquino, gave the update, she will try to bring a urine sample if she is able to get it.  KT

## 2022-09-20 NOTE — TELEPHONE ENCOUNTER
Called and sp/w Jake Lantigua. She states that the periods of agitation are increasing. It's mostly in the mornings. She has been yelling and aggressive with the caregiver. She recognized her and does better with her at times but it's not consistent. She has been sleeping very well (no concerns there). She doesn't have much of an appetite but they are forcing fluids to keep her hydrated. She is interested in ruling out a UTI - if nothing else to rule it out. They have been using the Hydroxizine, at first she needed it quite a bit and she seemed to get too sedated with it. They then backed off of it and now she's needing it more again just to get things done throughout the day and be manageable. Her agitation has gotten to the point that the caregiver has given up and says she can't take it anymore. Jake Lantigua states that she is looking into placement at one of the [address] [place] [address] locations in a memory care unit.

## 2022-09-20 NOTE — TELEPHONE ENCOUNTER
Yes, I am fine with leaving a UA. I am going to send another medication to the pharmacy to use for agitation. This may make her sleepy. It's lorazepam. Use this if the hydroxyzine doesn't help as a back up plan.

## 2022-09-21 ENCOUNTER — CLINICAL SUPPORT (OUTPATIENT)
Dept: FAMILY MEDICINE CLINIC | Age: 87
End: 2022-09-21

## 2022-09-21 DIAGNOSIS — R41.82 ALTERED MENTAL STATUS, UNSPECIFIED ALTERED MENTAL STATUS TYPE: ICD-10-CM

## 2022-09-21 DIAGNOSIS — F03.911 AGITATION DUE TO DEMENTIA: ICD-10-CM

## 2022-09-21 LAB
BILIRUB UR QL STRIP: NORMAL
GLUCOSE UR-MCNC: NEGATIVE MG/DL
KETONES P FAST UR STRIP-MCNC: NEGATIVE MG/DL
PH UR STRIP: 5.5 [PH] (ref 4.6–8)
PROT UR QL STRIP: NORMAL
SP GR UR STRIP: 1.03 (ref 1–1.03)
UA UROBILINOGEN AMB POC: NORMAL (ref 0.2–1)
URINALYSIS CLARITY POC: NORMAL
URINALYSIS COLOR POC: NORMAL
URINE BLOOD POC: NEGATIVE
URINE LEUKOCYTES POC: NEGATIVE
URINE NITRITES POC: NEGATIVE

## 2022-09-26 RX ORDER — SULFAMETHOXAZOLE AND TRIMETHOPRIM 800; 160 MG/1; MG/1
1 TABLET ORAL 2 TIMES DAILY
Qty: 10 TABLET | Refills: 0 | Status: SHIPPED | OUTPATIENT
Start: 2022-09-26 | End: 2022-10-01
